# Patient Record
Sex: FEMALE | Race: WHITE | NOT HISPANIC OR LATINO | Employment: OTHER | URBAN - METROPOLITAN AREA
[De-identification: names, ages, dates, MRNs, and addresses within clinical notes are randomized per-mention and may not be internally consistent; named-entity substitution may affect disease eponyms.]

---

## 2017-02-06 DIAGNOSIS — I10 ESSENTIAL HYPERTENSION: Primary | ICD-10-CM

## 2017-02-06 RX ORDER — LISINOPRIL 5 MG/1
5 TABLET ORAL DAILY
Qty: 30 TABLET | Refills: 0 | Status: SHIPPED | OUTPATIENT
Start: 2017-02-06 | End: 2017-02-28

## 2017-02-06 NOTE — TELEPHONE ENCOUNTER
Lisinopril     Last Written Prescription Date: 11/19/15  Last Fill Quantity: 90, # refills: 3  Last Office Visit with G, P or [facility] prescribing provider: 11/19/15       POTASSIUM   Date Value Ref Range Status   11/19/2015 4.2 3.4 - 5.3 mmol/L Final     CREATININE   Date Value Ref Range Status   11/19/2015 0.65 0.52 - 1.04 mg/dL Final     BP Readings from Last 3 Encounters:   10/10/16 141/86   06/13/16 122/74   11/19/15 136/82

## 2017-02-16 DIAGNOSIS — F43.23 ADJUSTMENT DISORDER WITH MIXED ANXIETY AND DEPRESSED MOOD: ICD-10-CM

## 2017-02-16 NOTE — TELEPHONE ENCOUNTER
Citalopram     Last Written Prescription Date: 11/19/15  Last Fill Quantity: 90, # refills: 3  Last Office Visit with G primary care provider:  11/19/15        Last PHQ-9 score on record=   PHQ-9 SCORE 11/19/2015   Total Score -   Total Score 6

## 2017-02-22 NOTE — TELEPHONE ENCOUNTER
Routing refill request to provider for review/approval because:  Drug interaction warning- is due for visit, last seen in November 2015. I was going to give 30 per protocol with note to be seen and there is a warning.     Destinee Weiner RNC

## 2017-02-23 RX ORDER — CITALOPRAM HYDROBROMIDE 10 MG/1
10 TABLET ORAL DAILY
Qty: 30 TABLET | Refills: 0 | OUTPATIENT
Start: 2017-02-23

## 2017-02-28 ENCOUNTER — OFFICE VISIT (OUTPATIENT)
Dept: FAMILY MEDICINE | Facility: CLINIC | Age: 77
End: 2017-02-28
Payer: COMMERCIAL

## 2017-02-28 VITALS
DIASTOLIC BLOOD PRESSURE: 71 MMHG | BODY MASS INDEX: 20.77 KG/M2 | WEIGHT: 117.2 LBS | TEMPERATURE: 96.4 F | HEIGHT: 63 IN | SYSTOLIC BLOOD PRESSURE: 124 MMHG | HEART RATE: 61 BPM

## 2017-02-28 DIAGNOSIS — F33.40 RECURRENT MAJOR DEPRESSIVE DISORDER, IN REMISSION (H): ICD-10-CM

## 2017-02-28 DIAGNOSIS — F43.23 ADJUSTMENT DISORDER WITH MIXED ANXIETY AND DEPRESSED MOOD: ICD-10-CM

## 2017-02-28 DIAGNOSIS — I10 ESSENTIAL HYPERTENSION: Primary | ICD-10-CM

## 2017-02-28 LAB
ANION GAP SERPL CALCULATED.3IONS-SCNC: 6 MMOL/L (ref 3–14)
BUN SERPL-MCNC: 17 MG/DL (ref 7–30)
CALCIUM SERPL-MCNC: 9 MG/DL (ref 8.5–10.1)
CHLORIDE SERPL-SCNC: 105 MMOL/L (ref 94–109)
CO2 SERPL-SCNC: 30 MMOL/L (ref 20–32)
CREAT SERPL-MCNC: 0.65 MG/DL (ref 0.52–1.04)
GFR SERPL CREATININE-BSD FRML MDRD: 88 ML/MIN/1.7M2
GLUCOSE SERPL-MCNC: 88 MG/DL (ref 70–99)
POTASSIUM SERPL-SCNC: 4.2 MMOL/L (ref 3.4–5.3)
SODIUM SERPL-SCNC: 141 MMOL/L (ref 133–144)

## 2017-02-28 PROCEDURE — 80048 BASIC METABOLIC PNL TOTAL CA: CPT | Performed by: NURSE PRACTITIONER

## 2017-02-28 PROCEDURE — 36415 COLL VENOUS BLD VENIPUNCTURE: CPT | Performed by: NURSE PRACTITIONER

## 2017-02-28 PROCEDURE — 99214 OFFICE O/P EST MOD 30 MIN: CPT | Performed by: NURSE PRACTITIONER

## 2017-02-28 RX ORDER — CITALOPRAM HYDROBROMIDE 10 MG/1
20 TABLET ORAL DAILY
Qty: 60 TABLET | Refills: 2 | Status: SHIPPED | OUTPATIENT
Start: 2017-02-28 | End: 2018-08-31

## 2017-02-28 RX ORDER — LISINOPRIL 5 MG/1
5 TABLET ORAL DAILY
Qty: 30 TABLET | Refills: 11 | Status: SHIPPED | OUTPATIENT
Start: 2017-02-28 | End: 2017-06-12

## 2017-02-28 ASSESSMENT — ANXIETY QUESTIONNAIRES
6. BECOMING EASILY ANNOYED OR IRRITABLE: SEVERAL DAYS
1. FEELING NERVOUS, ANXIOUS, OR ON EDGE: SEVERAL DAYS
3. WORRYING TOO MUCH ABOUT DIFFERENT THINGS: SEVERAL DAYS
5. BEING SO RESTLESS THAT IT IS HARD TO SIT STILL: NOT AT ALL
GAD7 TOTAL SCORE: 4
7. FEELING AFRAID AS IF SOMETHING AWFUL MIGHT HAPPEN: NOT AT ALL
2. NOT BEING ABLE TO STOP OR CONTROL WORRYING: SEVERAL DAYS
IF YOU CHECKED OFF ANY PROBLEMS ON THIS QUESTIONNAIRE, HOW DIFFICULT HAVE THESE PROBLEMS MADE IT FOR YOU TO DO YOUR WORK, TAKE CARE OF THINGS AT HOME, OR GET ALONG WITH OTHER PEOPLE: SOMEWHAT DIFFICULT

## 2017-02-28 ASSESSMENT — PATIENT HEALTH QUESTIONNAIRE - PHQ9: 5. POOR APPETITE OR OVEREATING: NOT AT ALL

## 2017-02-28 NOTE — LETTER
[facility]  [address] [place] [address]  Phone: [phone]    February 28, 2017    Gail Cuba  [address]  [place] [address]          Dear MsMarya Rosa Elena,    The results of your recent lab tests were within normal limits.  Component      Latest Ref Rng & Units 2/28/2017   Sodium      133 - 144 mmol/L 141   Potassium      3.4 - 5.3 mmol/L 4.2   Chloride      94 - 109 mmol/L 105   Carbon Dioxide      20 - 32 mmol/L 30   Anion Gap      3 - 14 mmol/L 6   Glucose      70 - 99 mg/dL 88   Urea Nitrogen      7 - 30 mg/dL 17   Creatinine      0.52 - 1.04 mg/dL 0.65   GFR Estimate      >60 mL/min/1.7m2 88   GFR Estimate If Black      >60 mL/min/1.7m2 >90 . . .   Calcium      8.5 - 10.1 mg/dL 9.0     If you have any further questions or problems, please contact our office.    Sincerely,      Maryellen Barillas NP / cb

## 2017-02-28 NOTE — PATIENT INSTRUCTIONS
1. Labs today  2. Take 2 tablet of Citalopram  3. Follow up in 2 months (8 weeks)          Thank you for choosing [facility].  You may be receiving a survey in the mail from [employer] regarding your visit today.  Please take a few minutes to complete and return the survey to let us know how we are doing.      If you have questions or concerns, please contact us via Augment or you can contact your care team at [phone].    Our Clinic hours are:  Monday 6:40 am  to 7:00 pm  Tuesday -Friday 6:40 am to 5:00 pm    The [place] outpatient lab hours are:  Monday - Friday 6:10 am to 4:45 pm  Saturdays 7:00 am to 11:00 am  Appointments are required, call [phone]    If you have clinical questions after hours or would like to schedule an appointment,  call the clinic at [phone].

## 2017-02-28 NOTE — MR AVS SNAPSHOT
After Visit Summary   2/28/2017    Gail Cuba    MRN: [id number]           Patient Information     Date Of Birth          1940        Visit Information        Provider Department      2/28/2017 10:20 AM Maryellen Barillas APRN CNP [facility]        Today's Diagnoses     Mild major depression (H)    -  1    Essential hypertension        Adjustment disorder with mixed anxiety and depressed mood          Care Instructions    1. Labs today  2. Take 2 tablet of Citalopram  3. Follow up in 2 months (8 weeks)          Thank you for choosing [facility].  You may be receiving a survey in the mail from [employer] regarding your visit today.  Please take a few minutes to complete and return the survey to let us know how we are doing.      If you have questions or concerns, please contact us via ZummZumm or you can contact your care team at [phone].    Our Clinic hours are:  Monday 6:40 am  to 7:00 pm  Tuesday -Friday 6:40 am to 5:00 pm    The [place] outpatient lab hours are:  Monday - Friday 6:10 am to 4:45 pm  Saturdays 7:00 am to 11:00 am  Appointments are required, call [phone]    If you have clinical questions after hours or would like to schedule an appointment,  call the clinic at [phone].        Follow-ups after your visit        Your next 10 appointments already scheduled     Jun 05, 2017 10:10 AM CDT   LAB with [facility] ([facility])    [address] [address]   [phone]           Patient must bring picture ID.  Patient should be prepared to give a urine specimen  Please do not eat 10-12 hours before your appointment if you are coming in fasting for labs on lipids, cholesterol, or glucose (sugar).  Pregnant women should follow their Care Team instructions. Water with medications is okay. Do not drink coffee or other fluids.   If you have concerns about taking  your medications, please ask at  "office or if scheduling via Enlighted, send a message by clicking on Secure Messaging, Message Your Care Team.            2017 10:30 AM CDT   Return Visit with Marilee Medeiros MD   [facility] ([facility])    [facility]  [address]  [place] [address] [phone]              Who to contact     If you have questions or need follow up information about today's clinic visit or your schedule please contact [facility] directly at [phone].  Normal or non-critical lab and imaging results will be communicated to you by [email or web address], letter or phone within 4 business days after the clinic has received the results. If you do not hear from us within 7 days, please contact the clinic through [employer] or phone. If you have a critical or abnormal lab result, we will notify you by phone as soon as possible.  Submit refill requests through Enlighted or call your pharmacy and they will forward the refill request to us. Please allow 3 business days for your refill to be completed.          Additional Information About Your Visit        Enlighted Information     Enlighted lets you send messages to your doctor, view your test results, renew your prescriptions, schedule appointments and more. To sign up, go to [email or web address] . Click on \"Log in\" on the left side of the screen, which will take you to the Welcome page. Then click on \"Sign up Now\" on the right side of the page.     You will be asked to enter the access code listed below, as well as some personal information. Please follow the directions to create your username and password.     Your access code is: [email or web address]  Expires: 2017 10:43 AM     Your access code will  in 90 days. If you need help or a new code, please call your [facility] or [phone].        Care EveryWhere ID     This is your Care EveryWhere ID. This could be used by other organizations to access your [place] " "medical records  [id number]        Your Vitals Were     Pulse Temperature Height BMI (Body Mass Index)          61 96.4  F (35.8  C) (Tympanic) 5' 3\" (1.6 m) 20.76 kg/m2         Blood Pressure from Last 3 Encounters:   02/28/17 124/71   10/10/16 141/86   06/13/16 122/74    Weight from Last 3 Encounters:   02/28/17 117 lb 3.2 oz (53.2 kg)   10/10/16 113 lb (51.3 kg)   06/13/16 117 lb (53.1 kg)              We Performed the Following     Basic metabolic panel     DEPRESSION ACTION PLAN (DAP)          Today's Medication Changes          These changes are accurate as of: 2/28/17 10:43 AM.  If you have any questions, ask your nurse or doctor.               These medicines have changed or have updated prescriptions.        Dose/Directions    citalopram 10 MG tablet   Commonly known as:  celeXA   This may have changed:  how much to take   Used for:  Adjustment disorder with mixed anxiety and depressed mood   Changed by:  Maryellen Barillas APRN CNP        Dose:  20 mg   Take 2 tablets (20 mg) by mouth daily   Quantity:  60 tablet   Refills:  2            Where to get your medicines      These medications were sent to [facility] [address] [place] [address] [address]   [address] [facility] [address]     Phone:  [phone]     citalopram 10 MG tablet    lisinopril 5 MG tablet                Primary Care Provider Office Phone # Fax #    NILS Ceballos [phone]       [facility] [address] [address]        Thank you!     Thank you for choosing [facility]  for your care. Our goal is always to provide you with excellent care. Hearing back from our patients is one way we can continue to improve our services. Please take a few minutes to complete the written survey that you may receive in the mail after your visit with us. Thank you!             Your Updated Medication List - Protect others around you: Learn how to safely use, store and throw " away your medicines at www.disposemymeds.org.          This list is accurate as of: 2/28/17 10:43 AM.  Always use your most recent med list.                   Brand Name Dispense Instructions for use    ALEVE PO      Take 220 mg by mouth nightly as needed for moderate pain       aspirin 81 MG tablet      1 TABLET DAILY       CALCIUM + D PO      1 tab daily       citalopram 10 MG tablet    celeXA    60 tablet    Take 2 tablets (20 mg) by mouth daily       lisinopril 5 MG tablet    PRINIVIL/ZESTRIL    30 tablet    Take 1 tablet (5 mg) by mouth daily (Needs follow-up appointment for this medication)       VITAMIN B-12 PO      1 tab daily

## 2017-02-28 NOTE — NURSING NOTE
"Chief Complaint   Patient presents with     Hypertension     Pt here for f/u on b/p meds.     Anxiety     Also f/u on anxiety, out of meds.       Initial /71  Pulse 61  Temp 96.4  F (35.8  C) (Tympanic)  Ht 5' 3\" (1.6 m)  Wt 117 lb 3.2 oz (53.2 kg)  BMI 20.76 kg/m2 Estimated body mass index is 20.76 kg/(m^2) as calculated from the following:    Height as of this encounter: 5' 3\" (1.6 m).    Weight as of this encounter: 117 lb 3.2 oz (53.2 kg).  Medication Reconciliation: complete  Carolyne Romero CMA    "

## 2017-02-28 NOTE — PROGRESS NOTES
SUBJECTIVE:                                                    Gail Cuba is a 77 year old female who presents to clinic today for the following health issues:  Chief Complaint   Patient presents with     Hypertension     Pt here for f/u on b/p meds.     Anxiety     Also f/u on anxiety, out of meds.       Hypertension Follow-up      Outpatient blood pressures are not being checked.    Low Salt Diet: adds a lot of salt to food  BP Readings from Last 3 Encounters:   02/28/17 124/71   10/10/16 141/86   06/13/16 122/74          Anxiety Follow-Up    Status since last visit: stable- having increase in anxiety.     Other associated symptoms:has had trouble sleeping forever    Complicating factors:   Significant life event: Yes-  Family, is now watching great grandchild 1 1/2 days per wk   Current substance abuse: None during the winter  Depression symptoms: No  MARTIN-7 SCORE 7/3/2013 8/4/2014 11/19/2015   Total Score 3 6 -   Total Score - - 3        GAD7       Amount of exercise or physical activity: None    Problems taking medications regularly: currently out    Medication side effects: none  Diet: regular (no restrictions)    -------------------------------------    Problem list and histories reviewed & adjusted, as indicated.  Additional history: as documented    Patient Active Problem List   Diagnosis     Osteoporosis     Breast cancer (H)     THICKENING ENDOMETRIUM     Back pain     Hyperlipidemia LDL goal <130     Urge incontinence of urine     Mild major depression (H)     UTI (urinary tract infection)     Health Care Home     Anxiety     Loose stools     Dysuria     Endometrial mass     Osteoporosis     Past Surgical History   Procedure Laterality Date     Surgical history of -        Neck surgery.     Hc excision breast lesion, open >=1  6-13-08     with sentinal node biopsy     Breast lumpectomy, rt/lt  1-26-09     Breast Lumpectomy RT     Surgical history of -   11/17/09     L4-5 back surgery     D & c   "5/24/10     for EMB      Colonoscopy  7/25/2013     Procedure: COLONOSCOPY;  Colonoscopy  ;  Surgeon: Deep Purcell MD;  Location: [facility]     Biopsy breast       Lumpectomy breast  2009     right breast     Dilation and curettage, operative hysteroscopy, combined  1/14/2014     Procedure: COMBINED DILATION AND CURETTAGE, OPERATIVE HYSTEROSCOPY;  Hysteroscopy with dilation and curettage, possible polypectomy- choice anes  ;  Surgeon: Lauryn Hoffman MD;  Location: [facility]       Social History   Substance Use Topics     Smoking status: Never Smoker     Smokeless tobacco: Never Used     Alcohol use Yes      Comment: occasional     Family History   Problem Relation Age of Onset     Alcohol/Drug Mother      alcohalic     Hypertension Mother      CEREBROVASCULAR DISEASE Mother      Thyroid Disease Sister      Musculoskeletal Disorder Son      DIABETES Father      Prostate Cancer Father      Cancer - colorectal Maternal Grandfather      Asthma No family hx of      C.A.D. No family hx of      Breast Cancer No family hx of      Anesthesia Reaction No family hx of      Blood Disease No family hx of      Ovarian Cancer No family hx of            Reviewed and updated as needed this visit by clinical staff  Tobacco  Allergies  Med Hx  Surg Hx  Fam Hx  Soc Hx      Reviewed and updated as needed this visit by Provider         ROS:  Constitutional, HEENT, cardiovascular, pulmonary, GI, , musculoskeletal, neuro, skin, endocrine and psych systems are negative, except as otherwise noted.    OBJECTIVE:                                                    /71  Pulse 61  Temp 96.4  F (35.8  C) (Tympanic)  Ht 5' 3\" (1.6 m)  Wt 117 lb 3.2 oz (53.2 kg)  BMI 20.76 kg/m2  Body mass index is 20.76 kg/(m^2).  GENERAL: healthy, alert and no distress  NECK: no adenopathy, no asymmetry, masses, or scars and thyroid normal to palpation  RESP: lungs clear to auscultation - no rales, rhonchi or wheezes  CV: regular rate and " rhythm, normal S1 S2, no S3 or S4, no murmur, click or rub, no peripheral edema and peripheral pulses strong  MS: no gross musculoskeletal defects noted, no edema  PSYCH: mentation appears normal, affect normal/bright    Diagnostic Test Results:  none      ASSESSMENT/PLAN:                                                        1. Essential hypertension  controlled  - lisinopril (PRINIVIL/ZESTRIL) 5 MG tablet; Take 1 tablet (5 mg) by mouth daily (Needs follow-up appointment for this medication)  Dispense: 30 tablet; Refill: 11  - Basic metabolic panel    2. Adjustment disorder with mixed anxiety and depressed mood  Having increase in anxiety with family situation- situational anxiety  - citalopram (CELEXA) 10 MG tablet; Take 2 tablets (20 mg) by mouth daily  Dispense: 60 tablet; Refill: 2 (will increase from 1 table to 2 tablets daily)  - follow up in 2 months for reassessment of symptoms.  - encouraged patient to speak with family members to address conflict which is increasing her anxiety    3. Mild major depression (H)  citalopram (CELEXA) 10 MG tablet; Take 2 tablets (20 mg) by mouth daily  Dispense: 60 tablet; Refill: 2 (will increase from 1 table to 2 tablets daily)      Follow up in 2 months    NILS Ceballos [facility]

## 2017-03-01 ASSESSMENT — ANXIETY QUESTIONNAIRES: GAD7 TOTAL SCORE: 4

## 2017-03-01 ASSESSMENT — PATIENT HEALTH QUESTIONNAIRE - PHQ9: SUM OF ALL RESPONSES TO PHQ QUESTIONS 1-9: 3

## 2017-03-17 ENCOUNTER — TRANSFERRED RECORDS (OUTPATIENT)
Dept: HEALTH INFORMATION MANAGEMENT | Facility: CLINIC | Age: 77
End: 2017-03-17

## 2017-06-05 ENCOUNTER — HOSPITAL ENCOUNTER (OUTPATIENT)
Dept: LAB | Facility: CLINIC | Age: 77
Discharge: HOME | End: 2017-06-05
Attending: INTERNAL MEDICINE | Admitting: INTERNAL MEDICINE
Payer: COMMERCIAL

## 2017-06-05 DIAGNOSIS — Z85.3 PERSONAL HISTORY OF MALIGNANT NEOPLASM OF BREAST: ICD-10-CM

## 2017-06-05 LAB
ALBUMIN SERPL-MCNC: 3.4 G/DL (ref 3.4–5)
ALP SERPL-CCNC: 71 U/L (ref 40–150)
ALT SERPL W P-5'-P-CCNC: 17 U/L (ref 0–50)
AST SERPL W P-5'-P-CCNC: 18 U/L (ref 0–45)
BILIRUB DIRECT SERPL-MCNC: 0.1 MG/DL (ref 0–0.2)
BILIRUB SERPL-MCNC: 0.5 MG/DL (ref 0.2–1.3)
CANCER AG27-29 SERPL-ACNC: 12 U/ML (ref 0–39)
PROT SERPL-MCNC: 6.6 G/DL (ref 6.8–8.8)

## 2017-06-05 PROCEDURE — 86300 IMMUNOASSAY TUMOR CA 15-3: CPT | Performed by: INTERNAL MEDICINE

## 2017-06-05 PROCEDURE — 36415 COLL VENOUS BLD VENIPUNCTURE: CPT | Performed by: INTERNAL MEDICINE

## 2017-06-05 PROCEDURE — 80076 HEPATIC FUNCTION PANEL: CPT | Performed by: INTERNAL MEDICINE

## 2017-06-12 ENCOUNTER — ONCOLOGY VISIT (OUTPATIENT)
Dept: ONCOLOGY | Facility: CLINIC | Age: 77
End: 2017-06-12
Attending: INTERNAL MEDICINE
Payer: COMMERCIAL

## 2017-06-12 VITALS
HEART RATE: 58 BPM | TEMPERATURE: 97.7 F | OXYGEN SATURATION: 97 % | DIASTOLIC BLOOD PRESSURE: 76 MMHG | SYSTOLIC BLOOD PRESSURE: 124 MMHG | HEIGHT: 64 IN | WEIGHT: 117.9 LBS | BODY MASS INDEX: 20.13 KG/M2 | RESPIRATION RATE: 16 BRPM

## 2017-06-12 DIAGNOSIS — M81.0 OSTEOPOROSIS: ICD-10-CM

## 2017-06-12 DIAGNOSIS — Z85.3 PERSONAL HISTORY OF MALIGNANT NEOPLASM OF BREAST: Primary | ICD-10-CM

## 2017-06-12 PROCEDURE — 99213 OFFICE O/P EST LOW 20 MIN: CPT | Performed by: INTERNAL MEDICINE

## 2017-06-12 PROCEDURE — 99211 OFF/OP EST MAY X REQ PHY/QHP: CPT

## 2017-06-12 RX ORDER — OMEGA-3-ACID ETHYL ESTERS 1 G/1
2 CAPSULE, LIQUID FILLED ORAL DAILY
COMMUNITY
End: 2019-10-10 | Stop reason: ALTCHOICE

## 2017-06-12 ASSESSMENT — PAIN SCALES - GENERAL: PAINLEVEL: NO PAIN (0)

## 2017-06-12 NOTE — PATIENT INSTRUCTIONS
Dr. Medeiros would like you to have a Mammogram in October and would like to see you back for a follow up appointment in 1 year with labs prior. When you are in need of a refill, please call your pharmacy and they will send us a request.  Copy of appointments, and after visit summary (AVS) given to patient.  If you have any questions please call April Cottrell RN, BSN Oncology Hematology  [facility] [phone]. For questions after business hours, or on holidays/weekends, please call our after hours Nurse Triage line [phone]. Thank you.

## 2017-06-12 NOTE — MR AVS SNAPSHOT
After Visit Summary   6/12/2017    Gail Cuba    MRN: [id number]           Patient Information     Date Of Birth          1940        Visit Information        Provider Department      6/12/2017 10:30 AM Marilee Medeiros MD [facility]        Today's Diagnoses     Personal history of malignant neoplasm of breast    -  1    Osteoporosis          Care Instructions    Dr. Medeiros would like you to have a Mammogram in October and would like to see you back for a follow up appointment in 1 year with labs prior. When you are in need of a refill, please call your pharmacy and they will send us a request.  Copy of appointments, and after visit summary (AVS) given to patient.  If you have any questions please call April Cottrell RN, BSN Oncology Hematology  [facility] [phone]. For questions after business hours, or on holidays/weekends, please call our after hours Nurse Triage line [phone]. Thank you.             Follow-ups after your visit        Who to contact     If you have questions or need follow up information about today's clinic visit or your schedule please contact [facility] directly at [phone].  Normal or non-critical lab and imaging results will be communicated to you by MyChart, letter or phone within 4 business days after the clinic has received the results. If you do not hear from us within 7 days, please contact the clinic through MyChart or phone. If you have a critical or abnormal lab result, we will notify you by phone as soon as possible.  Submit refill requests through [facility] or call your pharmacy and they will forward the refill request to us. Please allow 3 business days for your refill to be completed.          Additional Information About Your Visit        MyChart Information     [facility] lets you send messages to your doctor, view your test results, renew your prescriptions, schedule appointments and more. To sign  "up, go to [email or web address] . Click on \"Log in\" on the left side of the screen, which will take you to the Welcome page. Then click on \"Sign up Now\" on the right side of the page.     You will be asked to enter the access code listed below, as well as some personal information. Please follow the directions to create your username and password.     Your access code is: [email or web address]  Expires: 2017  2:29 PM     Your access code will  in 90 days. If you need help or a new code, please call your [place] clinic or [phone].        Care EveryWhere ID     This is your Care EveryWhere ID. This could be used by other organizations to access your [place] medical records  [id number]        Your Vitals Were     Pulse Temperature Respirations Height Pulse Oximetry Breastfeeding?    58 97.7  F (36.5  C) (Tympanic) 16 1.613 m (5' 3.5\") 97% No    BMI (Body Mass Index)                   20.56 kg/m2            Blood Pressure from Last 3 Encounters:   17 124/76   17 124/71   10/10/16 141/86    Weight from Last 3 Encounters:   17 53.5 kg (117 lb 14.4 oz)   17 53.2 kg (117 lb 3.2 oz)   10/10/16 51.3 kg (113 lb)               Primary Care Provider Office Phone # Fax #    Maryellen NILS [facility] [phone]       [address] [address]        Thank you!     Thank you for choosing [facility]  for your care. Our goal is always to provide you with excellent care. Hearing back from our patients is one way we can continue to improve our services. Please take a few minutes to complete the written survey that you may receive in the mail after your visit with us. Thank you!             Your Updated Medication List - Protect others around you: Learn how to safely use, store and throw away your medicines at www.disposemymeds.org.          This list is accurate as of: 17 11:59 PM.  Always use your most recent med list.                   " Brand Name Dispense Instructions for use    ALEVE PO      Take 220 mg by mouth nightly as needed for moderate pain       aspirin 81 MG tablet      1 TABLET DAILY       CALCIUM + D PO      1 tab daily       citalopram 10 MG tablet    celeXA    60 tablet    Take 2 tablets (20 mg) by mouth daily       omega-3 acid ethyl esters 1 G capsule    Lovaza     Take 2 g by mouth daily       VITAMIN B-12 PO      1 tab daily

## 2017-06-12 NOTE — NURSING NOTE
"Oncology Rooming Note    June 12, 2017 10:27 AM   Gail Cuba is a 77 year old female who presents for:    Chief Complaint   Patient presents with     Oncology Clinic Visit     1 year recheck Breast CA, review labs     Initial Vitals: /76 (BP Location: Right arm, Patient Position: Chair, Cuff Size: Adult Regular)  Pulse 58  Temp 97.7  F (36.5  C) (Tympanic)  Resp 16  Ht 1.613 m (5' 3.5\")  Wt 53.5 kg (117 lb 14.4 oz)  SpO2 97%  Breastfeeding? No  BMI 20.56 kg/m2 Estimated body mass index is 20.56 kg/(m^2) as calculated from the following:    Height as of this encounter: 1.613 m (5' 3.5\").    Weight as of this encounter: 53.5 kg (117 lb 14.4 oz). Body surface area is 1.55 meters squared.  No Pain (0) Comment: Data Unavailable   No LMP recorded. Patient is postmenopausal.  Allergies reviewed: Yes  Medications reviewed: Yes    Medications: Medication refills not needed today.  Pharmacy [facility] entered into [employer]: [facility] - [place], [place] - [address]    Clinical concerns: 1 year recheck Breast CA, review labs. No concerns today.     7 minutes for nursing intake (face to face time)     Veronica Marshall CMA              "

## 2017-06-12 NOTE — NURSING NOTE
"Oncology Rooming Note    June 12, 2017 10:25 AM   Gail Cuba is a 77 year old female who presents for:    Chief Complaint   Patient presents with     Oncology Clinic Visit     1 year recheck Breast CA, review labs     Initial Vitals: /73 (BP Location: Right arm, Patient Position: Chair, Cuff Size: Adult Regular)  Pulse 60  Temp 97.7  F (36.5  C) (Tympanic)  Resp 16  Ht 1.613 m (5' 3.5\")  Wt 53.5 kg (117 lb 14.4 oz)  SpO2 97%  Breastfeeding? No  BMI 20.56 kg/m2 Estimated body mass index is 20.56 kg/(m^2) as calculated from the following:    Height as of this encounter: 1.613 m (5' 3.5\").    Weight as of this encounter: 53.5 kg (117 lb 14.4 oz). Body surface area is 1.55 meters squared.  No Pain (0) Comment: Data Unavailable   No LMP recorded. Patient is postmenopausal.  Allergies reviewed: Yes  Medications reviewed: Yes    Medications: Medication refills not needed today.  Pharmacy name entered into [employer]: [facility] - [place], [place] - [address]    Clinical concerns:      7  minutes for nursing intake (face to face time)     Veronica Marshall CMA              "

## 2017-06-13 NOTE — PROGRESS NOTES
"DATE OF SERVICE:  06/12/2017       CHIEF COMPLAINT AND REASON FOR VISIT:  Breast cancer, status post tamoxifen.      HPI: She was diagnosed in 06/2008 through screening mammogram, had a lumpectomy, initially locally in July. Apparently, that one missed the tumor and had a re-excision 01/2009 with Dr. Marsh at the , indicating it is a grade 1 lesion, ER/HI positive, HER-2 negative. She had documented as Stage I disease. She initially declined post-lumpectomy radiation and eventually changed her mind, finished the radiation in 04/2009. She was on tamoxifen from summer 2008 to summer 2013.   PAST MEDICAL HISTORY: Osteoporosis, hyperlipidemia. Recurrent UTI.  MEDICATIONS: Reviewed in the Epic system. She could not tolerate Fosamax.   FAMILY HISTORY: Denied any family history of malignancy.   SOCIAL HISTORY: She is retired. Denies smoking and drinking.       REVIEW OF SYSTEMS:  Gail uCba is in her usual state of health.  She has no breast tissue.  She is active.  She has no hospitalizations or major surgery in the past 1 year.  She still has chronic constipation.      PHYSICAL EXAMINATION:    VITAL SIGNS:  Blood pressure 124/76, pulse 58, temperature 97.7  F (36.5  C), temperature source Tympanic, resp. rate 16, height 1.613 m (5' 3.5\"), weight 53.5 kg (117 lb 14.4 oz), SpO2 97 %, not currently breastfeeding.    HEENT: The patient is normocephalic, atraumatic. Pupils are equally reactive to light.  Sclerae are anicteric.  Moist oral mucosa.  Negative pharynx.  No oral thrush.   NECK:  Supple.  No jugular venous distention.  Thyroid is not palpable.   LYMPH NODES:  Superficial lymphadenopathy is not appreciable in the bilateral cervical, supraclavicular, axillary or inguinal areas.   CARDIOVASCULAR:  S1, S2 regular with no murmurs or gallops.  No carotid or abdominal bruits.   PULMONARY:  Lungs are clear to auscultation and percussion bilaterally.  There is no wheezing or rhonchi.   GASTROINTESTINAL:  Abdomen is " soft, nontender.  No hepatosplenomegaly.  No signs of ascites.  No mass appreciable.   MUSCULOSKELETAL/EXTREMITIES:  No edema.  No cyanotic changes.  No signs of joint deformity.  No lymphedema.   NEUROLOGIC:  Cranial nerves II-XII are grossly intact.  Sensation intact.  Muscle strength and muscle tone symmetrical, 5/5 throughout.   BACK:  No spinal or paraspinal tenderness.  No CVA tenderness.   SKIN:  No petechiae.  No rash.  No signs of cellulitis.   BREASTS:  Bilateral breast exam revealed well-healed lumpectomy scar on the right side.  No palpable lesions, no skin changes.      CURRENT LABORATORY DATAReviwed  2017 liver function tests, tumor marker are satisfactory.      Current Image Reviewed  Last mammogram 10/2016, bilateral screening mammogram that was negative.      OLD DATA REVIEW IN SUMMARY:   OLD DATA REVIEW IN SUMMARY:   Pelvic US 2013- uterine fibroid. Heterogeneous endometrial complex soft tissue, cannot distinguish between a mass versus abnormally thickened endometrial lining. Recommend clinical correlation.  Mammogram from 10/2011 that was negative.   Chest x-ray, bone scan 2011 was negative.   DEXA scan 2010, osteoporosis.      ASSESSMENT AND PLAN:   1.  Stage I breast cancer in , status post lumpectomy, radiation and tamoxifen for 5 years until .    We talked about ER positive breast cancer recurrence pattern.  She still feels comfortable coming in to see us on a yearly basis.    She is due 12-month follow up with labs.  Her mammogram is due in October each year.     2.  Osteoporosis.  She is a 77-year-old female.  We emphasized the importance of vitamin D and calcium intake.  She had poor tolerance to oral bisphosphonates.  She is not interested in repeating DEXA scan.         PIERO SALVADOR MD             D: 2017 13:47   T: 2017 22:28   MT: JESUS      Name:     LILLI CASH   MRN:      [id number]        Account:      [id number]   :      1940            Visit Date:   06/12/2017      Document: [id number]

## 2018-02-09 ENCOUNTER — TRANSFERRED RECORDS (OUTPATIENT)
Dept: HEALTH INFORMATION MANAGEMENT | Facility: CLINIC | Age: 78
End: 2018-02-09

## 2018-08-31 ENCOUNTER — OFFICE VISIT (OUTPATIENT)
Dept: FAMILY MEDICINE | Facility: CLINIC | Age: 78
End: 2018-08-31
Payer: COMMERCIAL

## 2018-08-31 VITALS
HEART RATE: 64 BPM | HEIGHT: 63 IN | WEIGHT: 119 LBS | OXYGEN SATURATION: 98 % | TEMPERATURE: 97.5 F | DIASTOLIC BLOOD PRESSURE: 84 MMHG | BODY MASS INDEX: 21.09 KG/M2 | SYSTOLIC BLOOD PRESSURE: 142 MMHG

## 2018-08-31 DIAGNOSIS — F43.23 ADJUSTMENT DISORDER WITH MIXED ANXIETY AND DEPRESSED MOOD: ICD-10-CM

## 2018-08-31 DIAGNOSIS — I10 ESSENTIAL HYPERTENSION, BENIGN: Primary | ICD-10-CM

## 2018-08-31 PROCEDURE — 99214 OFFICE O/P EST MOD 30 MIN: CPT | Performed by: NURSE PRACTITIONER

## 2018-08-31 RX ORDER — CITALOPRAM HYDROBROMIDE 20 MG/1
20 TABLET ORAL DAILY
Qty: 30 TABLET | Refills: 5 | Status: SHIPPED | OUTPATIENT
Start: 2018-08-31 | End: 2019-07-11

## 2018-08-31 RX ORDER — LISINOPRIL 5 MG/1
5 TABLET ORAL DAILY
Qty: 30 TABLET | Refills: 5 | Status: SHIPPED | OUTPATIENT
Start: 2018-08-31 | End: 2019-04-13

## 2018-08-31 ASSESSMENT — ANXIETY QUESTIONNAIRES
3. WORRYING TOO MUCH ABOUT DIFFERENT THINGS: SEVERAL DAYS
1. FEELING NERVOUS, ANXIOUS, OR ON EDGE: NEARLY EVERY DAY
2. NOT BEING ABLE TO STOP OR CONTROL WORRYING: MORE THAN HALF THE DAYS
6. BECOMING EASILY ANNOYED OR IRRITABLE: NEARLY EVERY DAY
GAD7 TOTAL SCORE: 9
7. FEELING AFRAID AS IF SOMETHING AWFUL MIGHT HAPPEN: NOT AT ALL
IF YOU CHECKED OFF ANY PROBLEMS ON THIS QUESTIONNAIRE, HOW DIFFICULT HAVE THESE PROBLEMS MADE IT FOR YOU TO DO YOUR WORK, TAKE CARE OF THINGS AT HOME, OR GET ALONG WITH OTHER PEOPLE: SOMEWHAT DIFFICULT
5. BEING SO RESTLESS THAT IT IS HARD TO SIT STILL: NOT AT ALL

## 2018-08-31 ASSESSMENT — PATIENT HEALTH QUESTIONNAIRE - PHQ9: 5. POOR APPETITE OR OVEREATING: NOT AT ALL

## 2018-08-31 NOTE — PATIENT INSTRUCTIONS
Start Lisinopril 5 mg daily    Blood sugars normal:    Results for LILLI CASH (MRN [id number]) as of 8/31/2018 14:38   Ref. Range 8/30/2012 08:09 2/28/2013 11:32 8/4/2014 13:22 11/19/2015 10:19 2/28/2017 10:45   Glucose Latest Ref Range: 70 - 99 mg/dL 87 81 98 80 88     Start Celexa 20 mg daily     Okay for dental procedure

## 2018-08-31 NOTE — MR AVS SNAPSHOT
"              After Visit Summary   8/31/2018    Gail Cash    MRN: [id number]           Patient Information     Date Of Birth          1940        Visit Information        Provider Department      8/31/2018 2:20 PM Deja Lyons APRN CNP [facility]        Today's Diagnoses     Essential hypertension, benign    -  1    Adjustment disorder with mixed anxiety and depressed mood          Care Instructions    Start Lisinopril 5 mg daily    Blood sugars normal:    Results for GAIL CASH (MRN [id number]) as of 8/31/2018 14:38   Ref. Range 8/30/2012 08:09 2/28/2013 11:32 8/4/2014 13:22 11/19/2015 10:19 2/28/2017 10:45   Glucose Latest Ref Range: 70 - 99 mg/dL 87 81 98 80 88     Start Celexa 20 mg daily     Okay for dental procedure           Follow-ups after your visit        Who to contact     If you have questions or need follow up information about today's clinic visit or your schedule please contact [facility] directly at [phone].  Normal or non-critical lab and imaging results will be communicated to you by MyChart, letter or phone within 4 business days after the clinic has received the results. If you do not hear from us within 7 days, please contact the clinic through MyChart or phone. If you have a critical or abnormal lab result, we will notify you by phone as soon as possible.  Submit refill requests through [employer] or call your pharmacy and they will forward the refill request to us. Please allow 3 business days for your refill to be completed.          Additional Information About Your Visit        Care EveryWhere ID     This is your Care EveryWhere ID. This could be used by other organizations to access your [place] medical records  [id number]        Your Vitals Were     Pulse Temperature Height Pulse Oximetry BMI (Body Mass Index)       64 97.5  F (36.4  C) (Tympanic) 5' 3\" (1.6 m) 98% 21.08 kg/m2        Blood Pressure from Last 3 Encounters: "   08/31/18 142/84   06/12/17 124/76   02/28/17 124/71    Weight from Last 3 Encounters:   08/31/18 119 lb (54 kg)   06/12/17 117 lb 14.4 oz (53.5 kg)   02/28/17 117 lb 3.2 oz (53.2 kg)              Today, you had the following     No orders found for display         Today's Medication Changes          These changes are accurate as of 8/31/18  2:42 PM.  If you have any questions, ask your nurse or doctor.               Start taking these medicines.        Dose/Directions    lisinopril 5 MG tablet   Commonly known as:  PRINIVIL/ZESTRIL   Used for:  Essential hypertension, benign   Started by:  Deja Lyons APRN CNP        Dose:  5 mg   Take 1 tablet (5 mg) by mouth daily   Quantity:  30 tablet   Refills:  5         These medicines have changed or have updated prescriptions.        Dose/Directions    citalopram 20 MG tablet   Commonly known as:  celeXA   This may have changed:  medication strength   Used for:  Adjustment disorder with mixed anxiety and depressed mood   Changed by:  Deja Lyons APRN CNP        Dose:  20 mg   Take 1 tablet (20 mg) by mouth daily   Quantity:  30 tablet   Refills:  5            Where to get your medicines      These medications were sent to [facility] [address] [place] [address] [address]   [address] [facility] [address]     Phone:  [phone]     citalopram 20 MG tablet    lisinopril 5 MG tablet                Primary Care Provider Office Phone # Fax #    Maryellen NILS Nazario [phone] [address] [facility] [address]        Equal Access to Services     MARGO NUNN AH: Will Aguilar, wahuma ho, qaybta kaalkatharina saavedra. So [facility] [phone].    ATENCIÓN: Si habla español, tiene a rey disposición servicios gratuitos de asistencia lingüística. Ferdinand al [phone].    We comply with applicable federal civil rights laws and [place] laws. We do not  discriminate on the basis of race, color, national origin, age, disability, sex, sexual orientation, or gender identity.            Thank you!     Thank you for choosing [facility]  for your care. Our goal is always to provide you with excellent care. Hearing back from our patients is one way we can continue to improve our services. Please take a few minutes to complete the written survey that you may receive in the mail after your visit with us. Thank you!             Your Updated Medication List - Protect others around you: Learn how to safely use, store and throw away your medicines at www.disposemymeds.org.          This list is accurate as of 8/31/18  2:42 PM.  Always use your most recent med list.                   Brand Name Dispense Instructions for use Diagnosis    ALEVE PO      Take 220 mg by mouth nightly as needed for moderate pain        aspirin 81 MG tablet      1 TABLET DAILY        CALCIUM + D PO      1 tab daily        citalopram 20 MG tablet    celeXA    30 tablet    Take 1 tablet (20 mg) by mouth daily    Adjustment disorder with mixed anxiety and depressed mood       lisinopril 5 MG tablet    PRINIVIL/ZESTRIL    30 tablet    Take 1 tablet (5 mg) by mouth daily    Essential hypertension, benign       MULTIVITAMIN ADULTS PO      Take by mouth daily        omega-3 acid ethyl esters 1 g capsule    Lovaza     Take 2 g by mouth daily        VITAMIN B-12 PO      1 tab daily

## 2018-08-31 NOTE — PROGRESS NOTES
"  SUBJECTIVE:   Gail Cuba is a 78 year old female who presents to clinic today for the following health issues: had high BP when saw dentist yesterday. The patient has history of mild hypertension, in the past was treated with Lisinopril 5 mg daily with good control.   The patient is feeling well today.    Reports having high amount of stress recently, mainly due to family stressors, sometimes needs to babysit grand child with ADHD. In the past was on Celexa for anxiety and depression, feel like she needs to restart antidepressant.     Problem list and histories reviewed & adjusted, as indicated.  Additional history: as documented    BP Readings from Last 3 Encounters:   08/31/18 142/84   06/12/17 124/76   02/28/17 124/71    Wt Readings from Last 3 Encounters:   08/31/18 119 lb (54 kg)   06/12/17 117 lb 14.4 oz (53.5 kg)   02/28/17 117 lb 3.2 oz (53.2 kg)           Reviewed and updated as needed this visit by clinical staff       Reviewed and updated as needed this visit by Provider         ROS:  Constitutional, HEENT, cardiovascular, pulmonary, gi and gu systems are negative, except as otherwise noted.    OBJECTIVE:     /84  Pulse 64  Temp 97.5  F (36.4  C) (Tympanic)  Ht 5' 3\" (1.6 m)  Wt 119 lb (54 kg)  SpO2 98%  BMI 21.08 kg/m2  Body mass index is 21.08 kg/(m^2).  GENERAL: healthy, alert and no distress  CV: regular rate and rhythm, normal S1 S2, no S3 or S4, no murmur, click or rub, no peripheral edema and peripheral pulses strong  PSYCH: mentation appears normal, affect normal/bright    Diagnostic Test Results:  none     ASSESSMENT/PLAN:     1. Essential hypertension, benign    - start lisinopril (PRINIVIL/ZESTRIL) 5 MG tablet; Take 1 tablet (5 mg) by mouth daily  Dispense: 30 tablet; Refill: 5  -monitor BP  -follow up if readings higher than 150/90     2. Adjustment disorder with mixed anxiety and depressed mood  - citalopram (CELEXA) 20 MG tablet; Take 1 tablet (20 mg) by mouth daily  " Dispense: 30 tablet; Refill: 5  -follow up in 6 weeks if symptoms not improving, otherwise in 6 months if feeling better.     See Patient Instructions    NILS Esquivel [facility]

## 2018-09-01 ASSESSMENT — PATIENT HEALTH QUESTIONNAIRE - PHQ9: SUM OF ALL RESPONSES TO PHQ QUESTIONS 1-9: 7

## 2018-09-01 ASSESSMENT — ANXIETY QUESTIONNAIRES: GAD7 TOTAL SCORE: 9

## 2019-04-13 DIAGNOSIS — I10 ESSENTIAL HYPERTENSION, BENIGN: ICD-10-CM

## 2019-04-15 RX ORDER — LISINOPRIL 5 MG/1
TABLET ORAL
Qty: 90 TABLET | Refills: 0 | Status: SHIPPED | OUTPATIENT
Start: 2019-04-15 | End: 2019-07-11

## 2019-04-15 NOTE — TELEPHONE ENCOUNTER
Routing refill request to provider for review/approval because:  Labs not current:  Cr and K+    Thank you    Marilee DIA RN

## 2019-04-15 NOTE — TELEPHONE ENCOUNTER
"Requested Prescriptions   Pending Prescriptions Disp Refills     lisinopril (PRINIVIL/ZESTRIL) 5 MG tablet [Pharmacy Med Name: LISINOPRIL 5MG      TAB] 30 tablet 5     Sig: TAKE 1 TABLET BY MOUTH ONCE DAILY   Last Written Prescription Date:  8/31/18  Last Fill Quantity: 30 tab,  # refills: 5   Last office visit: 8/31/2018 with prescribing provider:  Serena   Future Office Visit:        ACE Inhibitors (Including Combos) Protocol Failed - 4/13/2019  8:01 AM        Failed - Blood pressure under 140/90 in past 12 months     BP Readings from Last 3 Encounters:   08/31/18 142/84   06/12/17 124/76   02/28/17 124/71                 Failed - Normal serum creatinine on file in past 12 months     Recent Labs   Lab Test 02/28/17  1045  07/07/15  1241   CR 0.65   < >  --    CREAT  --   --  0.7    < > = values in this interval not displayed.             Failed - Normal serum potassium on file in past 12 months     Recent Labs   Lab Test 02/28/17  1045   POTASSIUM 4.2             Passed - Recent (12 mo) or future (30 days) visit within the authorizing provider's specialty     Patient had office visit in the last 12 months or has a visit in the next 30 days with authorizing provider or within the authorizing provider's specialty.  See \"Patient Info\" tab in inbasket, or \"Choose Columns\" in Meds & Orders section of the refill encounter.              Passed - Medication is active on med list        Passed - Patient is age 18 or older        Passed - No active pregnancy on record        Passed - No positive pregnancy test within past 12 months          "

## 2019-04-15 NOTE — TELEPHONE ENCOUNTER
Refilled for 3 months, follow up for BP recheck and labs, office visit please    NILS Esquivel CNP

## 2019-04-15 NOTE — TELEPHONE ENCOUNTER
Left message for patient to check pharmacy and call the [facility] clinic back to schedule appt with her PCP for further refills.    Toña FOY Rn

## 2019-07-11 ENCOUNTER — OFFICE VISIT (OUTPATIENT)
Dept: FAMILY MEDICINE | Facility: CLINIC | Age: 79
End: 2019-07-11
Payer: COMMERCIAL

## 2019-07-11 VITALS
TEMPERATURE: 97.9 F | DIASTOLIC BLOOD PRESSURE: 82 MMHG | BODY MASS INDEX: 18.78 KG/M2 | HEIGHT: 63 IN | RESPIRATION RATE: 16 BRPM | SYSTOLIC BLOOD PRESSURE: 112 MMHG | HEART RATE: 61 BPM | OXYGEN SATURATION: 99 % | WEIGHT: 106 LBS

## 2019-07-11 DIAGNOSIS — I10 ESSENTIAL HYPERTENSION, BENIGN: ICD-10-CM

## 2019-07-11 DIAGNOSIS — Z13.6 CARDIOVASCULAR SCREENING; LDL GOAL LESS THAN 100: Primary | ICD-10-CM

## 2019-07-11 DIAGNOSIS — F43.23 ADJUSTMENT DISORDER WITH MIXED ANXIETY AND DEPRESSED MOOD: ICD-10-CM

## 2019-07-11 PROCEDURE — 99214 OFFICE O/P EST MOD 30 MIN: CPT | Performed by: NURSE PRACTITIONER

## 2019-07-11 RX ORDER — LISINOPRIL 5 MG/1
5 TABLET ORAL DAILY
Qty: 90 TABLET | Refills: 3 | Status: SHIPPED | OUTPATIENT
Start: 2019-07-11 | End: 2019-10-10

## 2019-07-11 RX ORDER — MULTIVIT-MIN/FOLIC ACID/BIOTIN 66.7 MCG
2 TABLET ORAL DAILY
COMMUNITY
End: 2023-01-25

## 2019-07-11 RX ORDER — CITALOPRAM HYDROBROMIDE 20 MG/1
30 TABLET ORAL DAILY
Qty: 135 TABLET | Refills: 0 | Status: SHIPPED | OUTPATIENT
Start: 2019-07-11 | End: 2019-10-10

## 2019-07-11 ASSESSMENT — MIFFLIN-ST. JEOR: SCORE: 917

## 2019-07-11 ASSESSMENT — PATIENT HEALTH QUESTIONNAIRE - PHQ9: SUM OF ALL RESPONSES TO PHQ QUESTIONS 1-9: 7

## 2019-07-11 NOTE — PROGRESS NOTES
Subjective     Gail Cuba is a 79 year old female who presents to clinic today for the following health issues:  Chief Complaint   Patient presents with     Hypertension     follow up, renew meds     Anxiety     follow up, discuss meds     LAB REQUEST     patient would like fasting labs ordered for future, she is due . Not fasting today .      Health Maintenance     Declined TD and Prevnar 13         Hypertension Follow-up      Do you check your blood pressure regularly outside of the clinic? No     Are you following a low salt diet? No    Are your blood pressures ever more than 140 on the top number (systolic) OR more   than 90 on the bottom number (diastolic), for example 140/90? Yes  Depression and Anxiety Follow-Up    How are you doing with your depression since your last visit? No change- patient would like to increase medication as she feels it would help her symptoms although she is vague in describing her symptoms.     How are you doing with your anxiety since your last visit?  Worsened     Are you having other symptoms that might be associated with depression or anxiety? Yes:  Issues with sleep     Have you had a significant life event? Family Stress    Do you have any concerns with your use of alcohol or other drugs? No    Social History     Tobacco Use     Smoking status: Never Smoker     Smokeless tobacco: Never Used   Substance Use Topics     Alcohol use: Yes     Comment: occasional     Drug use: No     PHQ 11/19/2015 2/28/2017 8/31/2018   PHQ-9 Total Score 6 3 7   Q9: Thoughts of better off dead/self-harm past 2 weeks Not at all Not at all Not at all     MARTIN-7 SCORE 11/19/2015 2/28/2017 8/31/2018   Total Score - - -   Total Score 3 4 9       Suicide Assessment Five-step Evaluation and Treatment (SAFE-T)    Amount of exercise or physical activity: None    Problems taking medications regularly: Yes,  problems remembering to take    Medication side effects: none    Diet: regular (no  restrictions)      -------------------------------------    Patient Active Problem List   Diagnosis     Osteoporosis     Breast cancer (H)     THICKENING ENDOMETRIUM     Back pain     Hyperlipidemia LDL goal <130     Urge incontinence of urine     Mild major depression (H)     UTI (urinary tract infection)     Health Care Home     Anxiety     Loose stools     Dysuria     Endometrial mass     Osteoporosis     Past Surgical History:   Procedure Laterality Date     BIOPSY BREAST       BREAST LUMPECTOMY, RT/LT  1-26-09    Breast Lumpectomy RT     COLONOSCOPY  7/25/2013    Procedure: COLONOSCOPY;  Colonoscopy  ;  Surgeon: Deep Purcell MD;  Location: [facility]     D & C  5/24/10    for EMB      DILATION AND CURETTAGE, OPERATIVE HYSTEROSCOPY, COMBINED  1/14/2014    Procedure: COMBINED DILATION AND CURETTAGE, OPERATIVE HYSTEROSCOPY;  Hysteroscopy with dilation and curettage, possible polypectomy- choice anes  ;  Surgeon: Lauryn Hoffman MD;  Location: [facility]      EXCISION BREAST LESION, OPEN >=1  6-13-08    with sentinal node biopsy     LUMPECTOMY BREAST  2009    right breast     SURGICAL HISTORY OF -       Neck surgery.     SURGICAL HISTORY OF -   11/17/09    L4-5 back surgery       Social History     Tobacco Use     Smoking status: Never Smoker     Smokeless tobacco: Never Used   Substance Use Topics     Alcohol use: Yes     Comment: occasional     Family History   Problem Relation Age of Onset     Alcohol/Drug Mother         alcohalic     Hypertension Mother      Cerebrovascular Disease Mother      Thyroid Disease Sister      Musculoskeletal Disorder Son      Hypertension Son      Diabetes Father      Prostate Cancer Father      Cancer - colorectal Maternal Grandfather      Asthma No family hx of      C.A.D. No family hx of      Breast Cancer No family hx of      Anesthesia Reaction No family hx of      Blood Disease No family hx of      Ovarian Cancer No family hx of       "      -------------------------------------  Reviewed and updated as needed this visit by Provider         Review of Systems   ROS COMP: Constitutional, HEENT, cardiovascular, pulmonary, GI, , musculoskeletal, neuro, skin, endocrine and psych systems are negative, except as otherwise noted.      Objective    /82 (BP Location: Left arm, Patient Position: Chair, Cuff Size: Adult Regular)   Pulse 61   Temp 97.9  F (36.6  C) (Tympanic)   Resp 16   Ht 1.588 m (5' 2.5\")   Wt 48.1 kg (106 lb)   SpO2 99%   Breastfeeding? No   BMI 19.08 kg/m    Body mass index is 19.08 kg/m .  Physical Exam   GENERAL: healthy, alert and no distress  RESP: lungs clear to auscultation - no rales, rhonchi or wheezes  CV: regular rate and rhythm, normal S1 S2, no S3 or S4, no murmur, click or rub, no peripheral edema and peripheral pulses strong  MS: no gross musculoskeletal defects noted, no edema  PSYCH: mentation appears normal, affect normal/bright    Diagnostic Test Results:  Labs reviewed in Epic        Assessment & Plan     1. Adjustment disorder with mixed anxiety and depressed mood  Patient would like to increase from 20 mg to 30 mg- which based on PHQ9 I agree with  - follow up in 3 months  - citalopram (CELEXA) 20 MG tablet; Take 1.5 tablets (30 mg) by mouth daily  Dispense: 135 tablet; Refill: 0    2. Essential hypertension, benign  controlled  - lisinopril (PRINIVIL/ZESTRIL) 5 MG tablet; Take 1 tablet (5 mg) by mouth daily  Dispense: 90 tablet; Refill: 3  - Basic metabolic panel; Future    3. CARDIOVASCULAR SCREENING; LDL GOAL LESS THAN 100  Patient is requesting cholesterol check- last done 4 yrs ago  - Lipid panel reflex to direct LDL Fasting; Future     Follow up in 3 months    No follow-ups on file.    NILS Ceballos [facility]      "

## 2019-07-11 NOTE — PATIENT INSTRUCTIONS
Thank you for choosing [facility].  You may be receiving an email and/or telephone survey request from [facility] Experience regarding your visit today.  Please take a few minutes to respond to the survey to let us know how we are doing.      If you have questions or concerns, please contact us via WonderHowTo or you can contact your care team at [phone].    Our Clinic hours are:  Monday 6:40 am  to 7:00 pm  Tuesday -Friday 6:40 am to 5:00 pm    The [place] outpatient lab hours are:  Monday - Friday 6:10 am to 4:45 pm  Saturdays 7:00 am to 11:00 am  Appointments are required, call [phone]    If you have clinical questions after hours or would like to schedule an appointment,  call the clinic at [phone].

## 2019-07-19 DIAGNOSIS — Z13.6 CARDIOVASCULAR SCREENING; LDL GOAL LESS THAN 100: ICD-10-CM

## 2019-07-19 DIAGNOSIS — I10 ESSENTIAL HYPERTENSION, BENIGN: ICD-10-CM

## 2019-07-19 LAB
ANION GAP SERPL CALCULATED.3IONS-SCNC: 1 MMOL/L (ref 3–14)
BUN SERPL-MCNC: 14 MG/DL (ref 7–30)
CALCIUM SERPL-MCNC: 9.1 MG/DL (ref 8.5–10.1)
CHLORIDE SERPL-SCNC: 104 MMOL/L (ref 94–109)
CHOLEST SERPL-MCNC: 228 MG/DL
CO2 SERPL-SCNC: 33 MMOL/L (ref 20–32)
CREAT SERPL-MCNC: 0.66 MG/DL (ref 0.52–1.04)
GFR SERPL CREATININE-BSD FRML MDRD: 84 ML/MIN/{1.73_M2}
GLUCOSE SERPL-MCNC: 90 MG/DL (ref 70–99)
HDLC SERPL-MCNC: 79 MG/DL
LDLC SERPL CALC-MCNC: 118 MG/DL
NONHDLC SERPL-MCNC: 149 MG/DL
POTASSIUM SERPL-SCNC: 4.2 MMOL/L (ref 3.4–5.3)
SODIUM SERPL-SCNC: 138 MMOL/L (ref 133–144)
TRIGL SERPL-MCNC: 155 MG/DL

## 2019-07-19 PROCEDURE — 36415 COLL VENOUS BLD VENIPUNCTURE: CPT | Performed by: NURSE PRACTITIONER

## 2019-07-19 PROCEDURE — 80061 LIPID PANEL: CPT | Performed by: NURSE PRACTITIONER

## 2019-07-19 PROCEDURE — 80048 BASIC METABOLIC PNL TOTAL CA: CPT | Performed by: NURSE PRACTITIONER

## 2019-10-10 ENCOUNTER — OFFICE VISIT (OUTPATIENT)
Dept: FAMILY MEDICINE | Facility: CLINIC | Age: 79
End: 2019-10-10
Payer: COMMERCIAL

## 2019-10-10 VITALS
WEIGHT: 105.6 LBS | TEMPERATURE: 98.2 F | BODY MASS INDEX: 18.71 KG/M2 | RESPIRATION RATE: 14 BRPM | SYSTOLIC BLOOD PRESSURE: 128 MMHG | HEART RATE: 64 BPM | DIASTOLIC BLOOD PRESSURE: 76 MMHG | OXYGEN SATURATION: 98 % | HEIGHT: 63 IN

## 2019-10-10 DIAGNOSIS — F41.9 ANXIETY: Primary | ICD-10-CM

## 2019-10-10 DIAGNOSIS — F33.41 RECURRENT MAJOR DEPRESSIVE DISORDER, IN PARTIAL REMISSION (H): ICD-10-CM

## 2019-10-10 DIAGNOSIS — I10 ESSENTIAL HYPERTENSION, BENIGN: ICD-10-CM

## 2019-10-10 PROCEDURE — 99214 OFFICE O/P EST MOD 30 MIN: CPT | Performed by: NURSE PRACTITIONER

## 2019-10-10 RX ORDER — LISINOPRIL 5 MG/1
5 TABLET ORAL DAILY
Qty: 90 TABLET | Refills: 3 | Status: SHIPPED | OUTPATIENT
Start: 2019-10-10 | End: 2020-11-16

## 2019-10-10 RX ORDER — CITALOPRAM HYDROBROMIDE 20 MG/1
30 TABLET ORAL DAILY
Qty: 135 TABLET | Refills: 3 | Status: SHIPPED | OUTPATIENT
Start: 2019-10-10 | End: 2020-11-16

## 2019-10-10 ASSESSMENT — ANXIETY QUESTIONNAIRES
7. FEELING AFRAID AS IF SOMETHING AWFUL MIGHT HAPPEN: NOT AT ALL
IF YOU CHECKED OFF ANY PROBLEMS ON THIS QUESTIONNAIRE, HOW DIFFICULT HAVE THESE PROBLEMS MADE IT FOR YOU TO DO YOUR WORK, TAKE CARE OF THINGS AT HOME, OR GET ALONG WITH OTHER PEOPLE: NOT DIFFICULT AT ALL
GAD7 TOTAL SCORE: 4
6. BECOMING EASILY ANNOYED OR IRRITABLE: SEVERAL DAYS
2. NOT BEING ABLE TO STOP OR CONTROL WORRYING: SEVERAL DAYS
3. WORRYING TOO MUCH ABOUT DIFFERENT THINGS: NOT AT ALL
5. BEING SO RESTLESS THAT IT IS HARD TO SIT STILL: NOT AT ALL
1. FEELING NERVOUS, ANXIOUS, OR ON EDGE: SEVERAL DAYS

## 2019-10-10 ASSESSMENT — PATIENT HEALTH QUESTIONNAIRE - PHQ9
SUM OF ALL RESPONSES TO PHQ QUESTIONS 1-9: 3
5. POOR APPETITE OR OVEREATING: SEVERAL DAYS

## 2019-10-10 ASSESSMENT — MIFFLIN-ST. JEOR: SCORE: 915.19

## 2019-10-10 NOTE — PROGRESS NOTES
Subjective     Gail Cuba is a 79 year old female who presents to clinic today for the following health issues:    HPI   Anxiety Follow-Up    How are you doing with your anxiety since your last visit? Improved on the new dose of Citalopram, she increased from 20mg to 30mg    Are you having other symptoms that might be associated with anxiety? No    Have you had a significant life event? OTHER: family strife     Are you feeling depressed? No    Do you have any concerns with your use of alcohol or other drugs? No    Social History     Tobacco Use     Smoking status: Never Smoker     Smokeless tobacco: Never Used   Substance Use Topics     Alcohol use: Yes     Comment: occasional     Drug use: No     MARTIN-7 SCORE 11/19/2015 2/28/2017 8/31/2018   Total Score - - -   Total Score 3 4 9     PHQ 2/28/2017 8/31/2018 7/11/2019   PHQ-9 Total Score 3 7 7   Q9: Thoughts of better off dead/self-harm past 2 weeks Not at all Not at all Not at all     No flowsheet data found.  No flowsheet data found.        How many servings of fruits and vegetables do you eat daily?  0-1    On average, how many sweetened beverages do you drink each day (soda, juice, sweet tea, etc)?   0    How many days per week do you miss taking your medication? 0        Hypertension Follow-up      Do you check your blood pressure regularly outside of the clinic? No     Are you following a low salt diet? No    Are your blood pressures ever more than 140 on the top number (systolic) OR more   than 90 on the bottom number (diastolic), for example 140/90? No    Patient Active Problem List   Diagnosis     Osteoporosis     Breast cancer (H)     THICKENING ENDOMETRIUM     Back pain     Hyperlipidemia LDL goal <130     Urge incontinence of urine     Mild major depression (H)     UTI (urinary tract infection)     Health Care Home     Anxiety     Loose stools     Dysuria     Endometrial mass     Osteoporosis     Past Surgical History:   Procedure Laterality Date      BIOPSY BREAST       BREAST LUMPECTOMY, RT/LT  1-26-09    Breast Lumpectomy RT     COLONOSCOPY  7/25/2013    Procedure: COLONOSCOPY;  Colonoscopy  ;  Surgeon: Deep Purcell MD;  Location: [facility]     D & C  5/24/10    for EMB      DILATION AND CURETTAGE, OPERATIVE HYSTEROSCOPY, COMBINED  1/14/2014    Procedure: COMBINED DILATION AND CURETTAGE, OPERATIVE HYSTEROSCOPY;  Hysteroscopy with dilation and curettage, possible polypectomy- choice anes  ;  Surgeon: Lauryn Hoffman MD;  Location: [facility]      EXCISION BREAST LESION, OPEN >=1  6-13-08    with sentinal node biopsy     LUMPECTOMY BREAST  2009    right breast     SURGICAL HISTORY OF -       Neck surgery.     SURGICAL HISTORY OF -   11/17/09    L4-5 back surgery       Social History     Tobacco Use     Smoking status: Never Smoker     Smokeless tobacco: Never Used   Substance Use Topics     Alcohol use: Yes     Comment: occasional     Family History   Problem Relation Age of Onset     Alcohol/Drug Mother         alcohalic     Hypertension Mother      Cerebrovascular Disease Mother      Thyroid Disease Sister      Musculoskeletal Disorder Son      Hypertension Son      Diabetes Father      Prostate Cancer Father      Cancer - colorectal Maternal Grandfather      Asthma No family hx of      C.A.D. No family hx of      Breast Cancer No family hx of      Anesthesia Reaction No family hx of      Blood Disease No family hx of      Ovarian Cancer No family hx of            -------------------------------------  Reviewed and updated as needed this visit by Provider         Review of Systems   ROS COMP: Constitutional, HEENT, cardiovascular, pulmonary, GI, , musculoskeletal, neuro, skin, endocrine and psych systems are negative, except as otherwise noted.      Objective    There were no vitals taken for this visit.  There is no height or weight on file to calculate BMI.  Physical Exam   GENERAL: healthy, alert and no distress  RESP: lungs clear to  auscultation - no rales, rhonchi or wheezes  CV: regular rate and rhythm, normal S1 S2, no S3 or S4, no murmur, click or rub, no peripheral edema and peripheral pulses strong  MS: no gross musculoskeletal defects noted, no edema    Diagnostic Test Results:  Labs reviewed in Epic        Assessment & Plan     1. 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 Anxiety-   stable  Refilled Citalopram       Follow up in 1 year    No follow-ups on file.    NILS Ceballos [facility]

## 2019-10-10 NOTE — PATIENT INSTRUCTIONS
Thank you for choosing [facility].  You may be receiving an email and/or telephone survey request from [facility] Customer Experience regarding your visit today.  Please take a few minutes to respond to the survey to let us know how we are doing.      If you have questions or concerns, please contact us via [employer] or you can contact your care team at [phone].    Our Clinic hours are:  Monday 6:40 am  to 7:00 pm  Tuesday -Friday 6:40 am to 5:00 pm    The [place] outpatient lab hours are:  Monday - Friday 6:10 am to 4:45 pm  Saturdays 7:00 am to 11:00 am  Appointments are required, call [phone]    If you have clinical questions after hours or would like to schedule an appointment,  call the clinic at [phone].

## 2019-10-11 ASSESSMENT — ANXIETY QUESTIONNAIRES: GAD7 TOTAL SCORE: 4

## 2020-02-11 ENCOUNTER — ANESTHESIA EVENT (OUTPATIENT)
Dept: SURGERY | Facility: CLINIC | Age: 80
End: 2020-02-11
Payer: COMMERCIAL

## 2020-02-11 NOTE — H&P
[facility]  [facility]  [address]  [facility] [address]  [phone]  Dept: [phone]    OPHTHALMOLOGY PRE-OPERATIVE  HISTORY AND PHYSICAL    DATE OF H/P:  2020    DATE OF SURGERY:  2020  PROCEDURE:  Procedure(s):  Cataract Removal with Implant, Left Eye  LENS IMPLANT:  [id number] +19.0  REFRACTIVE GOAL:  PL Sph  SURGEON:  Jose Cerrato MD    ANESTHESIA:  TOPICAL / MAC    OR CASE REQUIREMENTS:    DEMOGRAPHICS:  Demographic Information on Gail Cbua:    Gail Cuba  Gender: female  : 1940  [address]  [place] [id number] [phone] (home)     Medical Record: [id number]  Social Security Number: [id number]  Pharmacy: [facility] [address] [place] [address] [address]  Primary Care Provider: Maryellen Barillas    Parent's names are: Data Unavailable (mother) and Data Unavailable (father).    Insurance: Payor: [employer] / Plan: [facility] MEDICARE NON [employer] / Product Type: HMO /     OCULAR HISTORY:  Cataracts, each eye.    HISTORIES:  Past Medical History:   Diagnosis Date     Anxiety      Breast cancer (H) 2009    Right breast cancer     Breast mass      Chest pain      Hypertension      MEDICAL HISTORY OF -     Rt breast cancer treated with radiation     Urge incontinence        Past Surgical History:   Procedure Laterality Date     BIOPSY BREAST       BREAST LUMPECTOMY, RT/LT  09    Breast Lumpectomy RT     COLONOSCOPY  2013    Procedure: COLONOSCOPY;  Colonoscopy  ;  Surgeon: Deep Purcell MD;  Location: [facility]     D & C  5/24/10    for EMB      DILATION AND CURETTAGE, OPERATIVE HYSTEROSCOPY, COMBINED  2014    Procedure: COMBINED DILATION AND CURETTAGE, OPERATIVE HYSTEROSCOPY;  Hysteroscopy with dilation and curettage, possible polypectomy- choice anes  ;  Surgeon: Lauryn Hoffman MD;  Location: [facility]      EXCISION BREAST LESION, OPEN >=1  08    with sentinal node biopsy      LUMPECTOMY BREAST  2009    right breast     SURGICAL HISTORY OF -       Neck surgery.     SURGICAL HISTORY OF -   11/17/09    L4-5 back surgery       Family History   Problem Relation Age of Onset     Alcohol/Drug Mother         alcohalic     Hypertension Mother      Cerebrovascular Disease Mother      Thyroid Disease Sister      Musculoskeletal Disorder Son      Hypertension Son      Diabetes Father      Prostate Cancer Father      Cancer - colorectal Maternal Grandfather      Asthma No family hx of      C.A.D. No family hx of      Breast Cancer No family hx of      Anesthesia Reaction No family hx of      Blood Disease No family hx of      Ovarian Cancer No family hx of        Social History     Tobacco Use     Smoking status: Never Smoker     Smokeless tobacco: Never Used   Substance Use Topics     Alcohol use: Yes     Comment: occasional       MEDICATIONS:  No current facility-administered medications for this encounter.      Current Outpatient Medications   Medication Sig     ASPIRIN 81 MG OR TABS 1 TABLET DAILY     CALCIUM + D OR 1 tab daily     citalopram (CELEXA) 20 MG tablet Take 1.5 tablets (30 mg) by mouth daily     cod liver oil CAPS capsule Take 1 capsule by mouth daily     lisinopril (PRINIVIL/ZESTRIL) 5 MG tablet Take 1 tablet (5 mg) by mouth daily     Multiple Vitamins-Minerals (HAIR/SKIN/NAILS) TABS Take 2 tablets by mouth daily     Multiple Vitamins-Minerals (MULTIVITAMIN ADULTS PO) Take by mouth daily     Naproxen Sodium (ALEVE PO) Take 220 mg by mouth nightly as needed for moderate pain     VITAMIN B-12 OR 1 tab daily       ALLERGIES:     Allergies   Allergen Reactions     Latex      Blistery rash from gloves     Nkda [No Known Drug Allergies]        PERTINENT SYSTEMS REVIEW:    1. No - Do you have a history of heart attack, stroke, stent, bypass or surgery on an artery in the head, neck, heart or legs?  2. No - Do you ever have any pain or discomfort in your chest?  3. No - Do you have a history  of  Heart Failure?  4. No - Are you troubled by shortness of breath when walking: On the level, up a slight hill or at night?  5. No - Do you currently have a cold, bronchitis or other respiratory infection?  6. No - Do you have a cough, shortness of breath or wheezing?  7. No - Do you sometimes get pains in the calves of your legs when you walk?  8. No - Do you or anyone in your family have previous history of blood clots?  9. No - Do you or does anyone in your family have a serious bleeding problem such as prolonged bleeding following surgeries or cuts?  10. No - Have you ever had problems with anemia or been told to take iron pills?  11. No - Have you had any abnormal blood loss such as black, tarry or bloody stools, or abnormal vaginal bleeding?  12. No - Have you ever had a blood transfusion?  13. No - Have you or any of your relatives ever had problems with anesthesia?  14. No - Do you have sleep apnea, excessive snoring or daytime drowsiness?  15. No - Do you have any prosthetic heart valves?  16. No - Do you have prosthetic joints?    EXAMINATION:  Vitals were reviewed  Vison:  Va, right - 20/30, left - 20/40;   BAT, right - 20/70, left - 20/80;  HEENT:  Cataract, otherwise unremarkable.  LUNGS:  Clear  CV:  Regular rate and rhythm without murmur  ABD:  Soft and nontender  NEURO:  Alert and nonfocal    IMPRESSION:  Patient cleared for ophthalmic surgery.  Low risk with monitored, light sedation.  I have assessed the patient's DVT risk, and no additional orders necessary.    PLAN:  Procedure(s):  Cataract Removal with Implant, Left Eye      Jose Cerrato MD

## 2020-02-11 NOTE — ANESTHESIA PREPROCEDURE EVALUATION
Anesthesia Evaluation     . Pt has had prior anesthetic. Type: MAC and General           ROS/MED HX    ENT/Pulmonary:  - neg pulmonary ROS     Neurologic:  - neg neurologic ROS     Cardiovascular:     (+) hypertension----. : . . . :. .       METS/Exercise Tolerance:     Hematologic:  - neg hematologic  ROS       Musculoskeletal:   (+) arthritis,  -       GI/Hepatic:  - neg GI/hepatic ROS       Renal/Genitourinary:  - ROS Renal section negative       Endo:  - neg endo ROS       Psychiatric:     (+) psychiatric history anxiety and depression      Infectious Disease:  - neg infectious disease ROS       Malignancy:      - no malignancy   Other:    - neg other ROS                 Physical Exam  Normal systems: cardiovascular and pulmonary    Airway   Mallampati: II  TM distance: >3 FB  Neck ROM: full    Dental   (+) upper dentures and lower dentures    Cardiovascular       Pulmonary    breath sounds clear to auscultation                    Anesthesia Plan      History & Physical Review  History and physical reviewed and following examination; no interval change.    ASA Status:  2 .    NPO Status:  > 6 hours    Plan for MAC Reason for MAC:  Other - see comments         Postoperative Care      Consents  Anesthetic plan, risks, benefits and alternatives discussed with:  Patient or representative and Patient..                            .    Anesthesia Plan      History & Physical Review  History and physical reviewed and following examination; no interval change.    ASA Status:  2 .    NPO Status:  > 6 hours    Plan for MAC Reason for MAC:  Other - see comments         Postoperative Care      Consents  Anesthetic plan, risks, benefits and alternatives discussed with:  Patient or representative and Patient..

## 2020-02-12 ENCOUNTER — ANESTHESIA (OUTPATIENT)
Dept: SURGERY | Facility: CLINIC | Age: 80
End: 2020-02-12
Payer: COMMERCIAL

## 2020-02-12 ENCOUNTER — HOSPITAL ENCOUNTER (OUTPATIENT)
Facility: CLINIC | Age: 80
Discharge: HOME | End: 2020-02-12
Attending: OPHTHALMOLOGY | Admitting: OPHTHALMOLOGY
Payer: COMMERCIAL

## 2020-02-12 VITALS
TEMPERATURE: 45.1 F | SYSTOLIC BLOOD PRESSURE: 116 MMHG | OXYGEN SATURATION: 99 % | HEIGHT: 63 IN | BODY MASS INDEX: 21.26 KG/M2 | DIASTOLIC BLOOD PRESSURE: 78 MMHG | RESPIRATION RATE: 16 BRPM | WEIGHT: 120 LBS

## 2020-02-12 PROCEDURE — 25000125 ZZHC RX 250: Performed by: OPHTHALMOLOGY

## 2020-02-12 PROCEDURE — 25800030 ZZH RX IP 258 OP 636: Performed by: NURSE ANESTHETIST, CERTIFIED REGISTERED

## 2020-02-12 PROCEDURE — 25000128 H RX IP 250 OP 636: Performed by: OPHTHALMOLOGY

## 2020-02-12 PROCEDURE — 37000012 ZZH ANESTHESIA CATARACT PACKAGE: Performed by: OPHTHALMOLOGY

## 2020-02-12 PROCEDURE — 25000125 ZZHC RX 250: Performed by: NURSE ANESTHETIST, CERTIFIED REGISTERED

## 2020-02-12 PROCEDURE — 36000025 ZZH CATARACT SURGICAL PACKAGE: Performed by: OPHTHALMOLOGY

## 2020-02-12 PROCEDURE — 25000128 H RX IP 250 OP 636: Performed by: NURSE ANESTHETIST, CERTIFIED REGISTERED

## 2020-02-12 PROCEDURE — 71000022 ZZH RECOVERY CATRACT PACKAGE: Performed by: OPHTHALMOLOGY

## 2020-02-12 PROCEDURE — V2632 POST CHMBR INTRAOCULAR LENS: HCPCS | Performed by: OPHTHALMOLOGY

## 2020-02-12 DEVICE — EYE IMP IOL AMO PCL TECNIS ZCB00 19.0: Type: IMPLANTABLE DEVICE | Site: EYE | Status: FUNCTIONAL

## 2020-02-12 RX ORDER — SODIUM CHLORIDE, SODIUM LACTATE, POTASSIUM CHLORIDE, CALCIUM CHLORIDE 600; 310; 30; 20 MG/100ML; MG/100ML; MG/100ML; MG/100ML
INJECTION, SOLUTION INTRAVENOUS CONTINUOUS
Status: DISCONTINUED | OUTPATIENT
Start: 2020-02-12 | End: 2020-02-12 | Stop reason: HOSPADM

## 2020-02-12 RX ORDER — PHENYLEPHRINE HYDROCHLORIDE 25 MG/ML
1 SOLUTION/ DROPS OPHTHALMIC
Status: COMPLETED | OUTPATIENT
Start: 2020-02-12 | End: 2020-02-12

## 2020-02-12 RX ORDER — BALANCED SALT SOLUTION 6.4; .75; .48; .3; 3.9; 1.7 MG/ML; MG/ML; MG/ML; MG/ML; MG/ML; MG/ML
SOLUTION OPHTHALMIC PRN
Status: DISCONTINUED | OUTPATIENT
Start: 2020-02-12 | End: 2020-02-12 | Stop reason: HOSPADM

## 2020-02-12 RX ORDER — LIDOCAINE 40 MG/G
CREAM TOPICAL
Status: DISCONTINUED | OUTPATIENT
Start: 2020-02-12 | End: 2020-02-12 | Stop reason: HOSPADM

## 2020-02-12 RX ORDER — TROPICAMIDE 10 MG/ML
1 SOLUTION/ DROPS OPHTHALMIC
Status: COMPLETED | OUTPATIENT
Start: 2020-02-12 | End: 2020-02-12

## 2020-02-12 RX ORDER — PROPARACAINE HYDROCHLORIDE 5 MG/ML
SOLUTION/ DROPS OPHTHALMIC PRN
Status: DISCONTINUED | OUTPATIENT
Start: 2020-02-12 | End: 2020-02-12 | Stop reason: HOSPADM

## 2020-02-12 RX ORDER — CYCLOPENTOLATE HYDROCHLORIDE 10 MG/ML
1 SOLUTION/ DROPS OPHTHALMIC
Status: COMPLETED | OUTPATIENT
Start: 2020-02-12 | End: 2020-02-12

## 2020-02-12 RX ORDER — LIDOCAINE HYDROCHLORIDE 20 MG/ML
JELLY TOPICAL PRN
Status: DISCONTINUED | OUTPATIENT
Start: 2020-02-12 | End: 2020-02-12 | Stop reason: HOSPADM

## 2020-02-12 RX ADMIN — PHENYLEPHRINE HYDROCHLORIDE 1 DROP: 25 SOLUTION/ DROPS OPHTHALMIC at 09:26

## 2020-02-12 RX ADMIN — TROPICAMIDE 1 DROP: 10 SOLUTION/ DROPS OPHTHALMIC at 09:32

## 2020-02-12 RX ADMIN — LIDOCAINE HYDROCHLORIDE 0.1 ML: 10 INJECTION, SOLUTION EPIDURAL; INFILTRATION; INTRACAUDAL; PERINEURAL at 09:23

## 2020-02-12 RX ADMIN — PHENYLEPHRINE HYDROCHLORIDE 1 DROP: 25 SOLUTION/ DROPS OPHTHALMIC at 09:19

## 2020-02-12 RX ADMIN — TROPICAMIDE 1 DROP: 10 SOLUTION/ DROPS OPHTHALMIC at 09:25

## 2020-02-12 RX ADMIN — CYCLOPENTOLATE HYDROCHLORIDE 1 DROP: 10 SOLUTION/ DROPS OPHTHALMIC at 09:25

## 2020-02-12 RX ADMIN — MIDAZOLAM 2 MG: 1 INJECTION INTRAMUSCULAR; INTRAVENOUS at 10:11

## 2020-02-12 RX ADMIN — TROPICAMIDE 1 DROP: 10 SOLUTION/ DROPS OPHTHALMIC at 09:18

## 2020-02-12 RX ADMIN — PHENYLEPHRINE HYDROCHLORIDE 1 DROP: 25 SOLUTION/ DROPS OPHTHALMIC at 09:33

## 2020-02-12 RX ADMIN — SODIUM CHLORIDE, POTASSIUM CHLORIDE, SODIUM LACTATE AND CALCIUM CHLORIDE: 600; 310; 30; 20 INJECTION, SOLUTION INTRAVENOUS at 09:50

## 2020-02-12 RX ADMIN — SODIUM CHLORIDE, POTASSIUM CHLORIDE, SODIUM LACTATE AND CALCIUM CHLORIDE: 600; 310; 30; 20 INJECTION, SOLUTION INTRAVENOUS at 09:22

## 2020-02-12 RX ADMIN — CYCLOPENTOLATE HYDROCHLORIDE 1 DROP: 10 SOLUTION/ DROPS OPHTHALMIC at 09:18

## 2020-02-12 RX ADMIN — CYCLOPENTOLATE HYDROCHLORIDE 1 DROP: 10 SOLUTION/ DROPS OPHTHALMIC at 09:32

## 2020-02-12 ASSESSMENT — MIFFLIN-ST. JEOR: SCORE: 983.45

## 2020-02-12 NOTE — OP NOTE
CATARACT OPERATIVE NOTE    PATIENT: Gail Cuba  DATE OF SURGERY: 2/12/2020  PREOPERATIVE DIAGNOSIS:  Senile Nuclear Cataract, Left eye  POSTOPERATIVE DIAGNOSIS:  Senile Nuclear Cataract, Left eye  OPERATIVE PROCEDURE:  Phacoemulsification and placement of intraocular lens  SURGEON:  Jose Cerrato MD  ANESTHESIA:  Topical / MAC  EBL:  None  SPECIMENS:  None  COMPLICATIONS:  None    PROCEDURE:  The patient was brought to the operating room at [facility].  The left eye was prepped and draped in the usual fashion for cataract surgery.  A wire lid speculum was inserted.  A super sharp blade was used to make a paracentesis at the 5 O'clock position.  The super sharp blade was used to make a partial thickness temporal groove, which was 3 mm in length.  0.8 mL of non-preserved epi-Shugarcaine was injected into the anterior chamber.  Viscoelastic was used to inflate the anterior chamber through a cannula.  A 2.5 mm microkeratome was used to make a temporal clear corneal incision in a two-plane fashion.  A cystotome needle and forceps were used to make a capsulorrhexis.  Hydrodissection and hydrodelineation were performed with Balance Salt Solution.  The lens was then phacoemulsified and removed without complications.  The cortical material was removed with bimanual irrigation and aspiration.  The capsular bag was filled with viscoelastic.  A posterior chamber intraocular lens, preselected and recorded, was folded and inserted into the capsular bag.  The viscoelastic was removed with the irrigation and aspiration tip.  Balanced Salt Solution with Vigamox, 150mg/0.1mL, was used to refill the anterior chamber.  The wounds were checked for water tightness and required no suture.  The wire lid speculum was removed.  The patient's left eye was cleaned and a drop of each post-operative drop was placed, followed by a bowman shield.  The patient tolerated the procedure well, and there were no  complications.      Jose Cerrato MD

## 2020-02-12 NOTE — ANESTHESIA POSTPROCEDURE EVALUATION
Patient: Gail Cuba    Procedure(s):  Cataract Removal with Implant    Diagnosis:Cataract [H26.9]  Diagnosis Additional Information: No value filed.    Anesthesia Type:  MAC    Note:  Anesthesia Post Evaluation    Patient location during evaluation: Bedside  Patient participation: Able to fully participate in evaluation  Pain management: adequate  Airway patency: patent  Cardiovascular status: acceptable  Respiratory status: acceptable  PONV: none     Anesthetic complications: None          Last vitals:  Vitals:    02/12/20 0920   BP: 138/85   Resp: 16   Temp: (!) 7.3  C (45.1  F)   SpO2: 98%         Electronically Signed By: NILS Ghosh CRNA  February 12, 2020  10:46 AM

## 2020-02-12 NOTE — ANESTHESIA CARE TRANSFER NOTE
Patient: Gail Cuba    Procedure(s):  Cataract Removal with Implant    Diagnosis: Cataract [H26.9]  Diagnosis Additional Information: No value filed.    Anesthesia Type:   MAC     Note:  Airway :Room Air  Patient transferred to:Phase II  Handoff Report: Identifed the Patient, Identified the Reponsible Provider, Reviewed the pertinent medical history, Discussed the surgical course, Reviewed Intra-OP anesthesia mangement and issues during anesthesia, Set expectations for post-procedure period and Allowed opportunity for questions and acknowledgement of understanding      Vitals: (Last set prior to Anesthesia Care Transfer)    CRNA VITALS  2/12/2020 1015 - 2/12/2020 1046      2/12/2020             Pulse:  67    SpO2:  98 %                Electronically Signed By: NILS Ghosh CRNA  February 12, 2020  10:46 AM

## 2020-03-02 ENCOUNTER — ANESTHESIA EVENT (OUTPATIENT)
Dept: SURGERY | Facility: CLINIC | Age: 80
End: 2020-03-02
Payer: COMMERCIAL

## 2020-03-02 NOTE — ANESTHESIA PREPROCEDURE EVALUATION
Anesthesia Pre-Procedure Evaluation    Patient: Gail Cuba   MRN: [id number] : 1940          Preoperative Diagnosis: Cataract [H26.9]    Procedure(s):  Cataract Removal with Implant    Past Medical History:   Diagnosis Date     Anxiety      Breast cancer (H) 2009    Right breast cancer     Breast mass      Chest pain      Hypertension      MEDICAL HISTORY OF -     Rt breast cancer treated with radiation     Urge incontinence      Past Surgical History:   Procedure Laterality Date     BIOPSY BREAST       BREAST LUMPECTOMY, RT/LT  09    Breast Lumpectomy RT     COLONOSCOPY  2013    Procedure: COLONOSCOPY;  Colonoscopy  ;  Surgeon: Deep Purcell MD;  Location: [facility]     D & C  5/24/10    for EMB      DILATION AND CURETTAGE, OPERATIVE HYSTEROSCOPY, COMBINED  2014    Procedure: COMBINED DILATION AND CURETTAGE, OPERATIVE HYSTEROSCOPY;  Hysteroscopy with dilation and curettage, possible polypectomy- choice anes  ;  Surgeon: Lauryn Hoffman MD;  Location: [facility]      EXCISION BREAST LESION, OPEN >=1  08    with sentinal node biopsy     LUMPECTOMY BREAST      right breast     PHACOEMULSIFICATION WITH STANDARD INTRAOCULAR LENS IMPLANT Left 2020    Procedure: Cataract Removal with Implant;  Surgeon: Jose Cerrato MD;  Location: [facility]     SURGICAL HISTORY OF -       Neck surgery.     SURGICAL HISTORY OF -   09    L4-5 back surgery       Anesthesia Evaluation     . Pt has had prior anesthetic. Type: General and MAC           ROS/MED HX    ENT/Pulmonary:       Neurologic:       Cardiovascular:     (+) Dyslipidemia, hypertension----. : . . . :. . Previous cardiac testing date:results:date: results:ECG reviewed date: results:Sinus Bradycardia   Voltage criteria for LVH (R(I)+S(III) exceeds 2.50 mV) -Voltage criteria w/o ST/T abnormality may be normal.     BORDERLINE date: results:          METS/Exercise Tolerance:     Hematologic:  - neg  "hematologic  ROS       Musculoskeletal:  - neg musculoskeletal ROS (+)  other musculoskeletal-       GI/Hepatic:  - neg GI/hepatic ROS       Renal/Genitourinary:  - ROS Renal section negative       Endo:  - neg endo ROS       Psychiatric:     (+) psychiatric history anxiety and depression      Infectious Disease:  - neg infectious disease ROS       Malignancy:   (+) Malignancy History of Breast  Breast CA status post Surgery.         Other:                          Physical Exam  Normal systems: cardiovascular, pulmonary and dental    Airway   Mallampati: II  TM distance: >3 FB  Neck ROM: full    Dental     Cardiovascular       Pulmonary             Lab Results   Component Value Date    WBC 5.7 01/07/2014    HGB 14.1 01/14/2014    HCT 41.9 01/07/2014     01/07/2014     07/19/2019    POTASSIUM 4.2 07/19/2019    CHLORIDE 104 07/19/2019    CO2 33 (H) 07/19/2019    BUN 14 07/19/2019    CR 0.66 07/19/2019    GLC 90 07/19/2019    CHRISTINE 9.1 07/19/2019    PHOS 3.6 02/28/2013    ALBUMIN 3.4 06/05/2017    PROTTOTAL 6.6 (L) 06/05/2017    ALT 17 06/05/2017    AST 18 06/05/2017    ALKPHOS 71 06/05/2017    BILITOTAL 0.5 06/05/2017    TSH 0.95 08/30/2012    T4 0.85 08/30/2012       Preop Vitals  BP Readings from Last 3 Encounters:   02/12/20 116/78   10/10/19 128/76   07/11/19 112/82    Pulse Readings from Last 3 Encounters:   10/10/19 64   07/11/19 61   08/31/18 64      Resp Readings from Last 3 Encounters:   02/12/20 16   10/10/19 14   07/11/19 16    SpO2 Readings from Last 3 Encounters:   02/12/20 99%   10/10/19 98%   07/11/19 99%      Temp Readings from Last 1 Encounters:   02/12/20 (!) 7.3  C (45.1  F) (Oral)    Ht Readings from Last 1 Encounters:   02/12/20 1.6 m (5' 3\")      Wt Readings from Last 1 Encounters:   02/12/20 54.4 kg (120 lb)    Estimated body mass index is 21.26 kg/m  as calculated from the following:    Height as of 2/12/20: 1.6 m (5' 3\").    Weight as of 2/12/20: 54.4 kg (120 lb).       Anesthesia " Plan      History & Physical Review  History and physical reviewed and following examination; no interval change.    ASA Status:  3 .    NPO Status:  > 6 hours    Plan for General and MAC with Intravenous and Propofol induction. Reason for MAC:  Other - see comments         Postoperative Care  Postoperative pain management:  IV analgesics and Oral pain medications.      Consents  Anesthetic plan, risks, benefits and alternatives discussed with:  Patient..                 NILS De Souza CRNA

## 2020-03-03 NOTE — H&P
[facility]  [facility]  [address]  [facility] [address]  [phone]  Dept: [phone]    OPHTHALMOLOGY PRE-OPERATIVE  HISTORY AND PHYSICAL    DATE OF H/P:  2020    DATE OF SURGERY:  2020  PROCEDURE:  Procedure(s):  Cataract Removal with Implant, Right Eye  LENS IMPLANT:  [id number] +19.0  REFRACTIVE GOAL:  PL Sph  SURGEON:  Jose Cerrato MD    ANESTHESIA:  TOPICAL / MAC    OR CASE REQUIREMENTS:    DEMOGRAPHICS:  Demographic Information on Gail Cuba:    Gail Cuba  Gender: female  : 1940  [address]  [place] [address] [phone] (home)     Medical Record: [id number]  Social Security Number: [id number]  Pharmacy: [facility] [address] [place] [address] [address]   Primary Care Provider: Maryellen Barillas    Parent's names are: Data Unavailable (mother) and Data Unavailable (father).    Insurance: Payor: [employer] / Plan: ARE MEDICARE NON [employer] PARTNERS / Product Type: HMO /     OCULAR HISTORY:  Cataracts, s/p IOL left eye.    HISTORIES:  Past Medical History:   Diagnosis Date     Anxiety      Breast cancer (H) 2009    Right breast cancer     Breast mass      Chest pain      Hypertension      MEDICAL HISTORY OF -     Rt breast cancer treated with radiation     Urge incontinence        Past Surgical History:   Procedure Laterality Date     BIOPSY BREAST       BREAST LUMPECTOMY, RT/LT  09    Breast Lumpectomy RT     COLONOSCOPY  2013    Procedure: COLONOSCOPY;  Colonoscopy  ;  Surgeon: Deep Purcell MD;  Location: [facility]     D & C  5/24/10    for EMB      DILATION AND CURETTAGE, OPERATIVE HYSTEROSCOPY, COMBINED  2014    Procedure: COMBINED DILATION AND CURETTAGE, OPERATIVE HYSTEROSCOPY;  Hysteroscopy with dilation and curettage, possible polypectomy- choice anes  ;  Surgeon: Lauryn Hoffman MD;  Location: [facility]      EXCISION BREAST LESION, OPEN >=1  08    with sentinal node biopsy      LUMPECTOMY BREAST  2009    right breast     PHACOEMULSIFICATION WITH STANDARD INTRAOCULAR LENS IMPLANT Left 2/12/2020    Procedure: Cataract Removal with Implant;  Surgeon: Jose Cerrato MD;  Location: [facility]     SURGICAL HISTORY OF -       Neck surgery.     SURGICAL HISTORY OF -   11/17/09    L4-5 back surgery       Family History   Problem Relation Age of Onset     Alcohol/Drug Mother         alcohalic     Hypertension Mother      Cerebrovascular Disease Mother      Thyroid Disease Sister      Musculoskeletal Disorder Son      Hypertension Son      Diabetes Father      Prostate Cancer Father      Cancer - colorectal Maternal Grandfather      Asthma No family hx of      C.A.D. No family hx of      Breast Cancer No family hx of      Anesthesia Reaction No family hx of      Blood Disease No family hx of      Ovarian Cancer No family hx of        Social History     Tobacco Use     Smoking status: Never Smoker     Smokeless tobacco: Never Used   Substance Use Topics     Alcohol use: Yes     Comment: occasional       MEDICATIONS:  No current facility-administered medications for this encounter.      Current Outpatient Medications   Medication Sig     ASPIRIN 81 MG OR TABS 1 TABLET DAILY     CALCIUM + D OR 1 tab daily     citalopram (CELEXA) 20 MG tablet Take 1.5 tablets (30 mg) by mouth daily     cod liver oil CAPS capsule Take 1 capsule by mouth daily     lisinopril (PRINIVIL/ZESTRIL) 5 MG tablet Take 1 tablet (5 mg) by mouth daily     Multiple Vitamins-Minerals (HAIR/SKIN/NAILS) TABS Take 2 tablets by mouth daily     Multiple Vitamins-Minerals (MULTIVITAMIN ADULTS PO) Take by mouth daily     Naproxen Sodium (ALEVE PO) Take 220 mg by mouth nightly as needed for moderate pain     VITAMIN B-12 OR 1 tab daily       ALLERGIES:     Allergies   Allergen Reactions     Latex      Blistery rash from gloves     Nkda [No Known Drug Allergies]        PERTINENT SYSTEMS REVIEW:    1. No - Do you have a history of heart  attack, stroke, stent, bypass or surgery on an artery in the head, neck, heart or legs?  2. No - Do you ever have any pain or discomfort in your chest?  3. No - Do you have a history of  Heart Failure?  4. No - Are you troubled by shortness of breath when walking: On the level, up a slight hill or at night?  5. No - Do you currently have a cold, bronchitis or other respiratory infection?  6. No - Do you have a cough, shortness of breath or wheezing?  7. No - Do you sometimes get pains in the calves of your legs when you walk?  8. No - Do you or anyone in your family have previous history of blood clots?  9. No - Do you or does anyone in your family have a serious bleeding problem such as prolonged bleeding following surgeries or cuts?  10. No - Have you ever had problems with anemia or been told to take iron pills?  11. No - Have you had any abnormal blood loss such as black, tarry or bloody stools, or abnormal vaginal bleeding?  12. No - Have you ever had a blood transfusion?  13. No - Have you or any of your relatives ever had problems with anesthesia?  14. No - Do you have sleep apnea, excessive snoring or daytime drowsiness?  15. No - Do you have any prosthetic heart valves?  16. No - Do you have prosthetic joints?    EXAMINATION:  Vitals were reviewed  Vison:  Va, right - 20/30;   BAT, right - 20/70;  HEENT:  Cataract, otherwise unremarkable.  LUNGS:  Clear  CV:  Regular rate and rhythm without murmur  ABD:  Soft and nontender  NEURO:  Alert and nonfocal    IMPRESSION:  Patient cleared for ophthalmic surgery.  Low risk with monitored, light sedation.  I have assessed the patient's DVT risk, and no additional orders necessary.    PLAN:  Procedure(s):  Cataract Removal with Implant, Right Eye      Jose Cerrato MD

## 2020-03-04 ENCOUNTER — HOSPITAL ENCOUNTER (OUTPATIENT)
Facility: CLINIC | Age: 80
Discharge: HOME | End: 2020-03-04
Attending: OPHTHALMOLOGY | Admitting: OPHTHALMOLOGY
Payer: COMMERCIAL

## 2020-03-04 ENCOUNTER — ANESTHESIA (OUTPATIENT)
Dept: SURGERY | Facility: CLINIC | Age: 80
End: 2020-03-04
Payer: COMMERCIAL

## 2020-03-04 VITALS
SYSTOLIC BLOOD PRESSURE: 131 MMHG | TEMPERATURE: 97.7 F | HEIGHT: 64 IN | BODY MASS INDEX: 20.32 KG/M2 | OXYGEN SATURATION: 98 % | WEIGHT: 119 LBS | RESPIRATION RATE: 16 BRPM | DIASTOLIC BLOOD PRESSURE: 72 MMHG

## 2020-03-04 PROCEDURE — 25800030 ZZH RX IP 258 OP 636: Performed by: NURSE ANESTHETIST, CERTIFIED REGISTERED

## 2020-03-04 PROCEDURE — 37000012 ZZH ANESTHESIA CATARACT PACKAGE: Performed by: OPHTHALMOLOGY

## 2020-03-04 PROCEDURE — 71000022 ZZH RECOVERY CATRACT PACKAGE: Performed by: OPHTHALMOLOGY

## 2020-03-04 PROCEDURE — V2632 POST CHMBR INTRAOCULAR LENS: HCPCS | Performed by: OPHTHALMOLOGY

## 2020-03-04 PROCEDURE — 25000128 H RX IP 250 OP 636: Performed by: OPHTHALMOLOGY

## 2020-03-04 PROCEDURE — 36000025 ZZH CATARACT SURGICAL PACKAGE: Performed by: OPHTHALMOLOGY

## 2020-03-04 PROCEDURE — 25000125 ZZHC RX 250: Performed by: OPHTHALMOLOGY

## 2020-03-04 PROCEDURE — 25000125 ZZHC RX 250: Performed by: NURSE ANESTHETIST, CERTIFIED REGISTERED

## 2020-03-04 PROCEDURE — 25000128 H RX IP 250 OP 636: Performed by: NURSE ANESTHETIST, CERTIFIED REGISTERED

## 2020-03-04 DEVICE — EYE IMP IOL AMO PCL TECNIS ZCB00 19.0: Type: IMPLANTABLE DEVICE | Site: EYE | Status: FUNCTIONAL

## 2020-03-04 RX ORDER — SODIUM CHLORIDE, SODIUM LACTATE, POTASSIUM CHLORIDE, CALCIUM CHLORIDE 600; 310; 30; 20 MG/100ML; MG/100ML; MG/100ML; MG/100ML
INJECTION, SOLUTION INTRAVENOUS CONTINUOUS
Status: DISCONTINUED | OUTPATIENT
Start: 2020-03-04 | End: 2020-03-04 | Stop reason: HOSPADM

## 2020-03-04 RX ORDER — CYCLOPENTOLATE HYDROCHLORIDE 10 MG/ML
1 SOLUTION/ DROPS OPHTHALMIC
Status: COMPLETED | OUTPATIENT
Start: 2020-03-04 | End: 2020-03-04

## 2020-03-04 RX ORDER — PHENYLEPHRINE HYDROCHLORIDE 25 MG/ML
1 SOLUTION/ DROPS OPHTHALMIC
Status: COMPLETED | OUTPATIENT
Start: 2020-03-04 | End: 2020-03-04

## 2020-03-04 RX ORDER — TROPICAMIDE 10 MG/ML
1 SOLUTION/ DROPS OPHTHALMIC
Status: COMPLETED | OUTPATIENT
Start: 2020-03-04 | End: 2020-03-04

## 2020-03-04 RX ORDER — BALANCED SALT SOLUTION 6.4; .75; .48; .3; 3.9; 1.7 MG/ML; MG/ML; MG/ML; MG/ML; MG/ML; MG/ML
SOLUTION OPHTHALMIC PRN
Status: DISCONTINUED | OUTPATIENT
Start: 2020-03-04 | End: 2020-03-04 | Stop reason: HOSPADM

## 2020-03-04 RX ORDER — PROPOFOL 10 MG/ML
INJECTION, EMULSION INTRAVENOUS PRN
Status: DISCONTINUED | OUTPATIENT
Start: 2020-03-04 | End: 2020-03-04

## 2020-03-04 RX ORDER — LIDOCAINE HYDROCHLORIDE 20 MG/ML
JELLY TOPICAL PRN
Status: DISCONTINUED | OUTPATIENT
Start: 2020-03-04 | End: 2020-03-04 | Stop reason: HOSPADM

## 2020-03-04 RX ORDER — LIDOCAINE 40 MG/G
CREAM TOPICAL
Status: DISCONTINUED | OUTPATIENT
Start: 2020-03-04 | End: 2020-03-04 | Stop reason: HOSPADM

## 2020-03-04 RX ORDER — PROPARACAINE HYDROCHLORIDE 5 MG/ML
SOLUTION/ DROPS OPHTHALMIC PRN
Status: DISCONTINUED | OUTPATIENT
Start: 2020-03-04 | End: 2020-03-04 | Stop reason: HOSPADM

## 2020-03-04 RX ADMIN — CYCLOPENTOLATE HYDROCHLORIDE 1 DROP: 10 SOLUTION/ DROPS OPHTHALMIC at 09:28

## 2020-03-04 RX ADMIN — CYCLOPENTOLATE HYDROCHLORIDE 1 DROP: 10 SOLUTION/ DROPS OPHTHALMIC at 09:22

## 2020-03-04 RX ADMIN — PROPOFOL 30 MG: 10 INJECTION, EMULSION INTRAVENOUS at 10:20

## 2020-03-04 RX ADMIN — TROPICAMIDE 1 DROP: 10 SOLUTION/ DROPS OPHTHALMIC at 09:21

## 2020-03-04 RX ADMIN — PHENYLEPHRINE HYDROCHLORIDE 1 DROP: 25 SOLUTION/ DROPS OPHTHALMIC at 09:18

## 2020-03-04 RX ADMIN — LIDOCAINE HYDROCHLORIDE 0.2 ML: 10 INJECTION, SOLUTION EPIDURAL; INFILTRATION; INTRACAUDAL; PERINEURAL at 09:27

## 2020-03-04 RX ADMIN — PHENYLEPHRINE HYDROCHLORIDE 1 DROP: 25 SOLUTION/ DROPS OPHTHALMIC at 09:22

## 2020-03-04 RX ADMIN — CYCLOPENTOLATE HYDROCHLORIDE 1 DROP: 10 SOLUTION/ DROPS OPHTHALMIC at 09:17

## 2020-03-04 RX ADMIN — PHENYLEPHRINE HYDROCHLORIDE 1 DROP: 25 SOLUTION/ DROPS OPHTHALMIC at 09:28

## 2020-03-04 RX ADMIN — TROPICAMIDE 1 DROP: 10 SOLUTION/ DROPS OPHTHALMIC at 09:28

## 2020-03-04 RX ADMIN — SODIUM CHLORIDE, POTASSIUM CHLORIDE, SODIUM LACTATE AND CALCIUM CHLORIDE: 600; 310; 30; 20 INJECTION, SOLUTION INTRAVENOUS at 09:27

## 2020-03-04 RX ADMIN — TROPICAMIDE 1 DROP: 10 SOLUTION/ DROPS OPHTHALMIC at 09:17

## 2020-03-04 ASSESSMENT — MIFFLIN-ST. JEOR: SCORE: 986.84

## 2020-03-04 NOTE — ANESTHESIA CARE TRANSFER NOTE
Patient: Gail Cuba    Procedure(s):  Cataract Removal with Implant    Diagnosis: Cataract [H26.9]  Diagnosis Additional Information: No value filed.    Anesthesia Type:   General, MAC     Note:  Airway :Room Air  Patient transferred to:Phase II  Handoff Report: Identifed the Patient, Identified the Reponsible Provider, Reviewed the pertinent medical history, Discussed the surgical course, Reviewed Intra-OP anesthesia mangement and issues during anesthesia, Set expectations for post-procedure period and Allowed opportunity for questions and acknowledgement of understanding      Vitals: (Last set prior to Anesthesia Care Transfer)    CRNA VITALS  3/4/2020 1003 - 3/4/2020 1033      3/4/2020             Pulse:  55    SpO2:  97 %                Electronically Signed By: NILS Ghosh CRNA  March 4, 2020  10:33 AM

## 2020-03-04 NOTE — ANESTHESIA POSTPROCEDURE EVALUATION
Patient: Gail Cuba    Procedure(s):  Cataract Removal with Implant    Diagnosis:Cataract [H26.9]  Diagnosis Additional Information: No value filed.    Anesthesia Type:  General, MAC    Note:  Anesthesia Post Evaluation    Patient location during evaluation: Bedside  Patient participation: Able to fully participate in evaluation  Level of consciousness: awake  Pain management: adequate  Airway patency: patent  Cardiovascular status: acceptable  Respiratory status: acceptable  Hydration status: stable  PONV: none     Anesthetic complications: None          Last vitals:  Vitals:    03/04/20 0909   BP: (!) 140/90   Resp: 16   Temp: 36.5  C (97.7  F)   SpO2: 100%         Electronically Signed By: NILS Ghosh CRNA  March 4, 2020  10:34 AM

## 2020-03-04 NOTE — OP NOTE
OPHTHALMOLOGY OPERATIVE NOTE    PATIENT: Gail Cuba  DATE OF SURGERY: 3/4/2020  PREOPERATIVE DIAGNOSIS:  Senile Nuclear Cataract, Right eye  POSTOPERATIVE DIAGNOSIS:  Senile Nuclear Cataract, Right eye  OPERATIVE PROCEDURE:  Phacoemulsification with placement of intraocular lens  SURGEON:  Jose Cerrato MD  ANESTHESIA:  Topical / MAC  EBL:  None  SPECIMENS:  None  COMPLICATIONS:  None    PROCEDURE:  The patient was brought to the operating room at [facility].  The right eye was prepped and draped in the usual fashion for cataract surgery.  A wire lid speculum was inserted.  A super sharp blade was used to make a paracentesis at the 11 O'clock position.  The super sharp blade was used to make a partial thickness temporal groove, which was 3 mm in length.  0.8 mL of non-preserved epi-Shugarcaine was injected into the anterior chamber.  Viscoelastic was used to inflate the anterior chamber through a cannula.  A 2.5 mm microkeratome was used to make a temporal clear corneal incision in a two-plane fashion.  A cystotome needle and forceps were used to make a capsulorrhexis.  Hydrodissection and hydrodelineation were performed with Balance Salt Solution.  The lens was then phacoemulsified and removed without complications.  The cortical material was removed with bimanual irrigation and aspiration.  The capsular bag was filled with viscoelastic.  A posterior chamber intraocular lens, preselected and recorded, was folded and inserted into the capsular bag.  The viscoelastic was removed with the irrigation and aspiration tip.  Balanced Salt Solution with Vigamox, 150mg/0.1mL, was used to refill the anterior chamber.  The wounds were checked for water tightness and required no suture.  The wire lid speculum was removed.  The patient's right eye was cleaned and a drop of each post-operative drop was placed, followed by a bowman shield.  The patient tolerated the procedure well, and there  were no complications.      Jose Cerrato MD

## 2020-11-13 DIAGNOSIS — I10 ESSENTIAL HYPERTENSION, BENIGN: ICD-10-CM

## 2020-11-13 DIAGNOSIS — F33.41 RECURRENT MAJOR DEPRESSIVE DISORDER, IN PARTIAL REMISSION (H): ICD-10-CM

## 2020-11-13 NOTE — TELEPHONE ENCOUNTER
"Requested Prescriptions   Pending Prescriptions Disp Refills     lisinopril (ZESTRIL) 5 MG tablet [Pharmacy Med Name: Lisinopril 5 MG Oral Tablet] 90 tablet 0     Sig: Take 1 tablet by mouth once daily       ACE Inhibitors (Including Combos) Protocol Failed - 11/13/2020 10:28 AM        Failed - Recent (12 mo) or future (30 days) visit within the authorizing provider's specialty     Patient has had an office visit with the authorizing provider or a provider within the authorizing providers department within the previous 12 mos or has a future within next 30 days. See \"Patient Info\" tab in inbasket, or \"Choose Columns\" in Meds & Orders section of the refill encounter.              Failed - Normal serum creatinine on file in past 12 months     Recent Labs   Lab Test 07/19/19  0757 07/07/15  1241 07/07/15  1241   CR 0.66   < >  --    CREAT  --   --  0.7    < > = values in this interval not displayed.       Ok to refill medication if creatinine is low          Failed - Normal serum potassium on file in past 12 months     Recent Labs   Lab Test 07/19/19  0757   POTASSIUM 4.2             Passed - Blood pressure under 140/90 in past 12 months     BP Readings from Last 3 Encounters:   03/04/20 131/72   02/12/20 116/78   10/10/19 128/76                 Passed - Medication is active on med list        Passed - Patient is age 18 or older        Passed - No active pregnancy on record        Passed - No positive pregnancy test within past 12 months           citalopram (CELEXA) 20 MG tablet [Pharmacy Med Name: Citalopram Hydrobromide 20 MG Oral Tablet] 135 tablet 0     Sig: TAKE 1 & 1/2 (ONE & ONE-HALF) TABLETS BY MOUTH ONCE DAILY       SSRIs Protocol Failed - 11/13/2020 10:28 AM        Failed - PHQ-9 score less than 5 in past 6 months     Please review last PHQ-9 score.           Failed - Recent (6 mo) or future (30 days) visit within the authorizing provider's specialty     Patient had office visit in the last 6 months or has a " "visit in the next 30 days with authorizing provider or within the authorizing provider's specialty.  See \"Patient Info\" tab in inbasket, or \"Choose Columns\" in Meds & Orders section of the refill encounter.            Passed - Medication is active on med list        Passed - Patient is age 18 or older        Passed - No active pregnancy on record        Passed - No positive pregnancy test in last 12 months             "

## 2020-11-13 NOTE — LETTER
[facility]  [address] [facility] [address]  Phone: [phone]       November 17, 2020         Gail Cuba  [address]  [place] [address]            Dear Gail:    We are concerned about your health care.  We recently provided you with medication refills.  Many medications require routine follow-up with your doctor.    Your prescription(s) have been refilled for 30 days so you may have time for the above noted follow-up. Please call to schedule soon so we can assure you have an appointment before your next refills are needed.    Thank you,      Maryellen Barillas NP / [facility]sm

## 2020-11-16 RX ORDER — LISINOPRIL 5 MG/1
TABLET ORAL
Qty: 30 TABLET | Refills: 0 | Status: SHIPPED | OUTPATIENT
Start: 2020-11-16 | End: 2020-12-22

## 2020-11-16 RX ORDER — CITALOPRAM HYDROBROMIDE 20 MG/1
TABLET ORAL
Qty: 45 TABLET | Refills: 0 | Status: SHIPPED | OUTPATIENT
Start: 2020-11-16 | End: 2020-12-22

## 2020-11-20 ENCOUNTER — TELEPHONE (OUTPATIENT)
Dept: FAMILY MEDICINE | Facility: CLINIC | Age: 80
End: 2020-11-20

## 2020-11-20 NOTE — TELEPHONE ENCOUNTER
11/20/20    Call Regarding Other Annual Wellness Visit    Attempt 2    Message on voicemail    Placed a 2nd call to patient to schedule an Annual Wellness Visit.   Lvm for a return call.      Outreach               SEBAS QUEEN

## 2020-12-03 NOTE — TELEPHONE ENCOUNTER
12/3/2020    Call Regarding ReattributionPhysical       Attempt 3    Message on voicemail    Comments: TYE      Outreach   INES

## 2020-12-17 ENCOUNTER — TELEPHONE (OUTPATIENT)
Dept: FAMILY MEDICINE | Facility: CLINIC | Age: 80
End: 2020-12-17

## 2020-12-17 ENCOUNTER — VIRTUAL VISIT (OUTPATIENT)
Dept: FAMILY MEDICINE | Facility: CLINIC | Age: 80
End: 2020-12-17
Payer: COMMERCIAL

## 2020-12-17 DIAGNOSIS — I10 ESSENTIAL HYPERTENSION, BENIGN: ICD-10-CM

## 2020-12-17 DIAGNOSIS — Z53.9 NO SHOW: Primary | ICD-10-CM

## 2020-12-17 DIAGNOSIS — F33.41 RECURRENT MAJOR DEPRESSIVE DISORDER, IN PARTIAL REMISSION (H): ICD-10-CM

## 2020-12-17 NOTE — PROGRESS NOTES
"Gail Cuba is a 80 year old female who is being evaluated via a billable telephone visit.      The patient has been notified of following:     \"This telephone visit will be conducted via a call between you and your physician/provider. We have found that certain health care needs can be provided without the need for a physical exam.  This service lets us provide the care you need with a short phone conversation.  If a prescription is necessary we can send it directly to your pharmacy.  If lab work is needed we can place an order for that and you can then stop by our lab to have the test done at a later time.    Telephone visits are billed at different rates depending on your insurance coverage. During this emergency period, for some insurers they may be billed the same as an in-person visit.  Please reach out to your insurance provider with any questions.    If during the course of the call the physician/provider feels a telephone visit is not appropriate, you will not be charged for this service.\"    Patient has given verbal consent for Telephone visit?  {YES-NO  Default Yes:4444::\"Yes\"}    What phone number would you like to be contacted at? ***    How would you like to obtain your AVS? {AVS Preference:637088}    Subjective     Gail Cuba is a 80 year old female who presents via phone visit today for the following health issues:    HPI     Hypertension Follow-up      Do you check your blood pressure regularly outside of the clinic? { :809455}     Are you following a low salt diet? { :472589}    Are your blood pressures ever more than 140 on the top number (systolic) OR more   than 90 on the bottom number (diastolic), for example 140/90? { :809417}    Depression Followup    How are you doing with your depression since your last visit? { :774237::\"No change\"}    Are you having other symptoms that might be associated with depression? { :216570}    Have you had a significant life event?  { :871258}     Are " "you feeling anxious or having panic attacks?   { :609719}    Do you have any concerns with your use of alcohol or other drugs? { :920803}    Social History     Tobacco Use     Smoking status: Never Smoker     Smokeless tobacco: Never Used   Substance Use Topics     Alcohol use: Yes     Comment: occasional     Drug use: No     PHQ 8/31/2018 7/11/2019 10/10/2019   PHQ-9 Total Score 7 7 3   Q9: Thoughts of better off dead/self-harm past 2 weeks Not at all Not at all Not at all     MARTIN-7 SCORE 2/28/2017 8/31/2018 10/10/2019   Total Score - - -   Total Score 4 9 4     {Last PHQ9 or GAD7 Responses (Optional):297246}  {PROVIDER ONLY Complete follow-up questions for patients who report suicide ideation  (Optional):927197}    Suicide Assessment Five-step Evaluation and Treatment (SAFE-T)      How many servings of fruits and vegetables do you eat daily?  { :442241}    On average, how many sweetened beverages do you drink each day (Examples: soda, juice, sweet tea, etc.  Do NOT count diet or artificially sweetened beverages)?   { 1-11:237446}    How many days per week do you exercise enough to make your heart beat faster? { :060984}    How many minutes a day do you exercise enough to make your heart beat faster? { :935085}    How many days per week do you miss taking your medication? {0-7 :062801}         {additonal problems for provider to add (Optional):547015}    Review of Systems   {ROS COMP (Optional):843121}       Objective          Vitals:  No vitals were obtained today due to virtual visit.    {GENERAL APPEARANCE:50::\"healthy\",\"alert\",\"no distress\"}  PSYCH: Alert and oriented times 3; coherent speech, normal   rate and volume, able to articulate logical thoughts, able   to abstract reason, no tangential thoughts, no hallucinations   or delusions  Her affect is { :0195054::\"normal\"}  RESP: No cough, no audible wheezing, able to talk in full sentences  Remainder of exam unable to be completed due to telephone " visits    {Diagnostic Test Results (Optional):645204}        Assessment/Plan:    {PROVIDER CHARTING PREFERENCE SOAPO:689168}    Phone call duration:  *** minutes

## 2020-12-17 NOTE — TELEPHONE ENCOUNTER
Reason for call:  Other   Patient called regarding (reason for call): call back    Additional comments: per patient , she is returning a call from the provider.     Phone number to reach patient:  Home number on file [phone] (home)    Best Time:  Anytime     Can we leave a detailed message on this number?  NO    Travel screening: Not Applicable

## 2020-12-17 NOTE — TELEPHONE ENCOUNTER
"Requested Prescriptions   Pending Prescriptions Disp Refills     citalopram (CELEXA) 20 MG tablet [Pharmacy Med Name: Citalopram Hydrobromide 20 MG Oral Tablet] 45 tablet 0     Sig: TAKE 1 & 1/2 (ONE & ONE-HALF) TABLETS BY MOUTH ONCE DAILY       SSRIs Protocol Failed - 12/17/2020  9:26 AM        Failed - PHQ-9 score less than 5 in past 6 months     Please review last PHQ-9 score.           Passed - Medication is active on med list        Passed - Patient is age 18 or older        Passed - No active pregnancy on record        Passed - No positive pregnancy test in last 12 months        Passed - Recent (6 mo) or future (30 days) visit within the authorizing provider's specialty     Patient had office visit in the last 6 months or has a visit in the next 30 days with authorizing provider or within the authorizing provider's specialty.  See \"Patient Info\" tab in inbasket, or \"Choose Columns\" in Meds & Orders section of the refill encounter.               lisinopril (ZESTRIL) 5 MG tablet [Pharmacy Med Name: Lisinopril 5 MG Oral Tablet] 30 tablet 0     Sig: Take 1 tablet by mouth once daily       ACE Inhibitors (Including Combos) Protocol Failed - 12/17/2020  9:26 AM        Failed - Normal serum creatinine on file in past 12 months     Recent Labs   Lab Test 07/19/19  0757 07/07/15  1241 07/07/15  1241   CR 0.66   < >  --    CREAT  --   --  0.7    < > = values in this interval not displayed.       Ok to refill medication if creatinine is low          Failed - Normal serum potassium on file in past 12 months     Recent Labs   Lab Test 07/19/19  0757   POTASSIUM 4.2             Passed - Blood pressure under 140/90 in past 12 months     BP Readings from Last 3 Encounters:   03/04/20 131/72   02/12/20 116/78   10/10/19 128/76                 Passed - Recent (12 mo) or future (30 days) visit within the authorizing provider's specialty     Patient has had an office visit with the authorizing provider or a provider within the " "authorizing providers department within the previous 12 mos or has a future within next 30 days. See \"Patient Info\" tab in inbasket, or \"Choose Columns\" in Meds & Orders section of the refill encounter.              Passed - Medication is active on med list        Passed - Patient is age 18 or older        Passed - No active pregnancy on record        Passed - No positive pregnancy test within past 12 months             "

## 2020-12-22 RX ORDER — CITALOPRAM HYDROBROMIDE 20 MG/1
TABLET ORAL
Qty: 45 TABLET | Refills: 0 | Status: SHIPPED | OUTPATIENT
Start: 2020-12-22 | End: 2021-01-06

## 2020-12-22 RX ORDER — LISINOPRIL 5 MG/1
TABLET ORAL
Qty: 30 TABLET | Refills: 0 | Status: SHIPPED | OUTPATIENT
Start: 2020-12-22 | End: 2021-01-07

## 2020-12-22 NOTE — TELEPHONE ENCOUNTER
Routing refill request to provider for review/approval because:  Patient needs to be seen because it has been more than 1 year since last office visit.  No showed last visit.    Vanessa Avery RN

## 2020-12-23 NOTE — TELEPHONE ENCOUNTER
Covering for PCP (out of clinic today):  One refill sent.  Please advise patient to reschedule follow-up visit.    Thanks,  Dillon Bello MD

## 2020-12-23 NOTE — TELEPHONE ENCOUNTER
Message left for patient to check the pharmacy.  Needs office visit for further refills. Becky RAMIREZ RN

## 2021-01-06 NOTE — PROGRESS NOTES
Gail Cuba is a 80 year old female who is being evaluated via a billable telephone visit.      What phone number would you like to be contacted at? [phone]  How would you like to obtain your AVS? Mail a copy  Assessment & Plan     Recurrent major depressive disorder, in partial remission (H)  Well controlled.  - citalopram (CELEXA) 20 MG tablet; TAKE 1 & 1/2 (ONE & ONE-HALF) TABLETS BY MOUTH ONCE DAILY    Essential hypertension, benign  Has been well controlled when in clinic.  She doesn't have a home BP monitor machine.  Due for BMP - she will make a lab only appointment.    - lisinopril (ZESTRIL) 5 MG tablet; Take 1 tablet (5 mg) by mouth daily  - **Basic metabolic panel FUTURE anytime; Future                Return in about 6 months (around 7/7/2021).    The risks, benefits and treatment options of prescribed medications or other treatments have been discussed with the patient. The patient verbalized their understanding and should call or follow up if no improvement or if they develop further problems.    NILS [facility]     Gail Cuba is a 80 year old who presents to clinic today for the following health issues     HPI       Hypertension Follow-up      Do you check your blood pressure regularly outside of the clinic? No     Are you following a low salt diet? No    Are your blood pressures ever more than 140 on the top number (systolic) OR more   than 90 on the bottom number (diastolic), for example 140/90?  Doesn't check    Depression Followup    How are you doing with your depression since your last visit? Stable - feeling better since her citalopram was increased to 30 mg daily    Are you having other symptoms that might be associated with depression? No    Have you had a significant life event?  No     Are you feeling anxious or having panic attacks?   No    Do you have any concerns with your use of alcohol or other drugs?  "No    Social History     Tobacco Use     Smoking status: Never Smoker     Smokeless tobacco: Never Used   Substance Use Topics     Alcohol use: Yes     Comment: occasional     Drug use: No     PHQ 7/11/2019 10/10/2019 1/7/2021   PHQ-9 Total Score 7 3 2   Q9: Thoughts of better off dead/self-harm past 2 weeks Not at all Not at all Not at all     MARTIN-7 SCORE 2/28/2017 8/31/2018 10/10/2019   Total Score - - -   Total Score 4 9 4           Suicide Assessment Five-step Evaluation and Treatment (SAFE-T)      How many servings of fruits and vegetables do you eat daily?  0-1    On average, how many sweetened beverages do you drink each day (Examples: soda, juice, sweet tea, etc.  Do NOT count diet or artificially sweetened beverages)?   0-1;  Juice once in a while    How many days per week do you exercise enough to make your heart beat faster?     How many minutes a day do you exercise enough to make your heart beat faster?     How many days per week do you miss taking your medication? Once in a while she forgets-  Maybe once every three weeks.        Review of Systems   Constitutional, HEENT, cardiovascular, pulmonary, gi and gu systems are negative, except as otherwise noted.      Objective    Vitals - Patient Reported  Weight (Patient Reported): 49.9 kg (110 lb)  Height (Patient Reported): 161.3 cm (5' 3.5\")  BMI (Based on Pt Reported Ht/Wt): 19.18      Vitals:  No vitals were obtained today due to virtual visit.    Physical Exam   PSYCH: Alert and oriented times 3; coherent speech, normal   rate and volume, able to articulate logical thoughts, able   to abstract reason, no tangential thoughts, no hallucinations   or delusions    RESP: No cough, no audible wheezing, able to talk in full sentences  Remainder of exam unable to be completed due to telephone visits                Phone call duration: 10 minutes  "

## 2021-01-07 ENCOUNTER — VIRTUAL VISIT (OUTPATIENT)
Dept: FAMILY MEDICINE | Facility: CLINIC | Age: 81
End: 2021-01-07
Payer: COMMERCIAL

## 2021-01-07 DIAGNOSIS — F33.41 RECURRENT MAJOR DEPRESSIVE DISORDER, IN PARTIAL REMISSION (H): ICD-10-CM

## 2021-01-07 DIAGNOSIS — I10 ESSENTIAL HYPERTENSION, BENIGN: ICD-10-CM

## 2021-01-07 PROCEDURE — 99441 PR PHYSICIAN TELEPHONE EVALUATION 5-10 MIN: CPT | Mod: 95 | Performed by: NURSE PRACTITIONER

## 2021-01-07 RX ORDER — CITALOPRAM HYDROBROMIDE 20 MG/1
TABLET ORAL
Qty: 135 TABLET | Refills: 3 | Status: SHIPPED | OUTPATIENT
Start: 2021-01-07 | End: 2022-01-20

## 2021-01-07 RX ORDER — LISINOPRIL 5 MG/1
5 TABLET ORAL DAILY
Qty: 90 TABLET | Refills: 3 | Status: SHIPPED | OUTPATIENT
Start: 2021-01-07 | End: 2022-01-20

## 2021-01-07 ASSESSMENT — PATIENT HEALTH QUESTIONNAIRE - PHQ9: SUM OF ALL RESPONSES TO PHQ QUESTIONS 1-9: 2

## 2021-01-15 DIAGNOSIS — I10 ESSENTIAL HYPERTENSION, BENIGN: ICD-10-CM

## 2021-01-15 LAB
ANION GAP SERPL CALCULATED.3IONS-SCNC: 1 MMOL/L (ref 3–14)
BUN SERPL-MCNC: 12 MG/DL (ref 7–30)
CALCIUM SERPL-MCNC: 9 MG/DL (ref 8.5–10.1)
CHLORIDE SERPL-SCNC: 107 MMOL/L (ref 94–109)
CO2 SERPL-SCNC: 32 MMOL/L (ref 20–32)
CREAT SERPL-MCNC: 0.67 MG/DL (ref 0.52–1.04)
GFR SERPL CREATININE-BSD FRML MDRD: 82 ML/MIN/{1.73_M2}
GLUCOSE SERPL-MCNC: 71 MG/DL (ref 70–99)
POTASSIUM SERPL-SCNC: 4.2 MMOL/L (ref 3.4–5.3)
SODIUM SERPL-SCNC: 140 MMOL/L (ref 133–144)

## 2021-01-15 PROCEDURE — 36415 COLL VENOUS BLD VENIPUNCTURE: CPT | Performed by: NURSE PRACTITIONER

## 2021-01-15 PROCEDURE — 80048 BASIC METABOLIC PNL TOTAL CA: CPT | Performed by: NURSE PRACTITIONER

## 2021-05-27 ENCOUNTER — RECORDS - HEALTHEAST (OUTPATIENT)
Dept: ADMINISTRATIVE | Facility: CLINIC | Age: 81
End: 2021-05-27

## 2021-05-28 ENCOUNTER — RECORDS - HEALTHEAST (OUTPATIENT)
Dept: ADMINISTRATIVE | Facility: CLINIC | Age: 81
End: 2021-05-28

## 2022-01-15 ENCOUNTER — HEALTH MAINTENANCE LETTER (OUTPATIENT)
Age: 82
End: 2022-01-15

## 2022-01-20 ENCOUNTER — OFFICE VISIT (OUTPATIENT)
Dept: FAMILY MEDICINE | Facility: CLINIC | Age: 82
End: 2022-01-20
Payer: COMMERCIAL

## 2022-01-20 VITALS
DIASTOLIC BLOOD PRESSURE: 70 MMHG | BODY MASS INDEX: 18.44 KG/M2 | OXYGEN SATURATION: 98 % | SYSTOLIC BLOOD PRESSURE: 124 MMHG | TEMPERATURE: 95.8 F | HEART RATE: 60 BPM | WEIGHT: 108 LBS | HEIGHT: 64 IN

## 2022-01-20 DIAGNOSIS — I10 ESSENTIAL HYPERTENSION, BENIGN: ICD-10-CM

## 2022-01-20 DIAGNOSIS — Z00.00 ENCOUNTER FOR MEDICARE ANNUAL WELLNESS EXAM: Primary | ICD-10-CM

## 2022-01-20 DIAGNOSIS — M81.0 AGE-RELATED OSTEOPOROSIS WITHOUT CURRENT PATHOLOGICAL FRACTURE: ICD-10-CM

## 2022-01-20 DIAGNOSIS — F33.41 RECURRENT MAJOR DEPRESSIVE DISORDER, IN PARTIAL REMISSION (H): ICD-10-CM

## 2022-01-20 DIAGNOSIS — R53.83 FATIGUE, UNSPECIFIED TYPE: ICD-10-CM

## 2022-01-20 DIAGNOSIS — R41.3 MEMORY LOSS: ICD-10-CM

## 2022-01-20 LAB
ANION GAP SERPL CALCULATED.3IONS-SCNC: 2 MMOL/L (ref 3–14)
BUN SERPL-MCNC: 15 MG/DL (ref 7–30)
CALCIUM SERPL-MCNC: 9.5 MG/DL (ref 8.5–10.1)
CHLORIDE BLD-SCNC: 103 MMOL/L (ref 94–109)
CHOLEST SERPL-MCNC: 212 MG/DL
CO2 SERPL-SCNC: 31 MMOL/L (ref 20–32)
CREAT SERPL-MCNC: 0.59 MG/DL (ref 0.52–1.04)
DEPRECATED CALCIDIOL+CALCIFEROL SERPL-MC: 38 UG/L (ref 20–75)
ERYTHROCYTE [DISTWIDTH] IN BLOOD BY AUTOMATED COUNT: 12.9 % (ref 10–15)
FASTING STATUS PATIENT QL REPORTED: NO
GFR SERPL CREATININE-BSD FRML MDRD: 89 ML/MIN/1.73M2
GLUCOSE BLD-MCNC: 78 MG/DL (ref 70–99)
HCT VFR BLD AUTO: 39.9 % (ref 35–47)
HDLC SERPL-MCNC: 68 MG/DL
HGB BLD-MCNC: 12.5 G/DL (ref 11.7–15.7)
LDLC SERPL CALC-MCNC: 115 MG/DL
MCH RBC QN AUTO: 29.3 PG (ref 26.5–33)
MCHC RBC AUTO-ENTMCNC: 31.3 G/DL (ref 31.5–36.5)
MCV RBC AUTO: 94 FL (ref 78–100)
NONHDLC SERPL-MCNC: 144 MG/DL
PLATELET # BLD AUTO: 255 10E3/UL (ref 150–450)
POTASSIUM BLD-SCNC: 4.3 MMOL/L (ref 3.4–5.3)
RBC # BLD AUTO: 4.26 10E6/UL (ref 3.8–5.2)
SODIUM SERPL-SCNC: 136 MMOL/L (ref 133–144)
TRIGL SERPL-MCNC: 145 MG/DL
TSH SERPL DL<=0.005 MIU/L-ACNC: 1.41 MU/L (ref 0.4–4)
VIT B12 SERPL-MCNC: 412 PG/ML (ref 193–986)
WBC # BLD AUTO: 4.5 10E3/UL (ref 4–11)

## 2022-01-20 PROCEDURE — 99214 OFFICE O/P EST MOD 30 MIN: CPT | Mod: 25 | Performed by: NURSE PRACTITIONER

## 2022-01-20 PROCEDURE — 80061 LIPID PANEL: CPT | Performed by: NURSE PRACTITIONER

## 2022-01-20 PROCEDURE — 82306 VITAMIN D 25 HYDROXY: CPT | Performed by: NURSE PRACTITIONER

## 2022-01-20 PROCEDURE — G0438 PPPS, INITIAL VISIT: HCPCS | Performed by: NURSE PRACTITIONER

## 2022-01-20 PROCEDURE — 85027 COMPLETE CBC AUTOMATED: CPT | Performed by: NURSE PRACTITIONER

## 2022-01-20 PROCEDURE — 36415 COLL VENOUS BLD VENIPUNCTURE: CPT | Performed by: NURSE PRACTITIONER

## 2022-01-20 PROCEDURE — 84443 ASSAY THYROID STIM HORMONE: CPT | Performed by: NURSE PRACTITIONER

## 2022-01-20 PROCEDURE — 82607 VITAMIN B-12: CPT | Performed by: NURSE PRACTITIONER

## 2022-01-20 PROCEDURE — 80048 BASIC METABOLIC PNL TOTAL CA: CPT | Performed by: NURSE PRACTITIONER

## 2022-01-20 RX ORDER — VITAMIN E 268 MG
CAPSULE ORAL
COMMUNITY
End: 2023-01-25

## 2022-01-20 RX ORDER — BIOTIN 1 MG
1000 TABLET ORAL DAILY
COMMUNITY
End: 2023-01-25

## 2022-01-20 RX ORDER — CITALOPRAM HYDROBROMIDE 20 MG/1
TABLET ORAL
Qty: 135 TABLET | Refills: 3 | Status: CANCELLED | OUTPATIENT
Start: 2022-01-20

## 2022-01-20 RX ORDER — LISINOPRIL 5 MG/1
5 TABLET ORAL DAILY
Qty: 90 TABLET | Refills: 3 | Status: SHIPPED | OUTPATIENT
Start: 2022-01-20 | End: 2023-01-11

## 2022-01-20 RX ORDER — ESCITALOPRAM OXALATE 10 MG/1
10 TABLET ORAL DAILY
Qty: 90 TABLET | Refills: 3 | Status: SHIPPED | OUTPATIENT
Start: 2022-01-20 | End: 2023-01-11

## 2022-01-20 ASSESSMENT — ANXIETY QUESTIONNAIRES
5. BEING SO RESTLESS THAT IT IS HARD TO SIT STILL: NOT AT ALL
1. FEELING NERVOUS, ANXIOUS, OR ON EDGE: NEARLY EVERY DAY
2. NOT BEING ABLE TO STOP OR CONTROL WORRYING: MORE THAN HALF THE DAYS
7. FEELING AFRAID AS IF SOMETHING AWFUL MIGHT HAPPEN: NOT AT ALL
GAD7 TOTAL SCORE: 9
IF YOU CHECKED OFF ANY PROBLEMS ON THIS QUESTIONNAIRE, HOW DIFFICULT HAVE THESE PROBLEMS MADE IT FOR YOU TO DO YOUR WORK, TAKE CARE OF THINGS AT HOME, OR GET ALONG WITH OTHER PEOPLE: SOMEWHAT DIFFICULT
6. BECOMING EASILY ANNOYED OR IRRITABLE: MORE THAN HALF THE DAYS
3. WORRYING TOO MUCH ABOUT DIFFERENT THINGS: MORE THAN HALF THE DAYS

## 2022-01-20 ASSESSMENT — PATIENT HEALTH QUESTIONNAIRE - PHQ9
5. POOR APPETITE OR OVEREATING: NOT AT ALL
SUM OF ALL RESPONSES TO PHQ QUESTIONS 1-9: 10

## 2022-01-20 ASSESSMENT — ACTIVITIES OF DAILY LIVING (ADL): CURRENT_FUNCTION: NO ASSISTANCE NEEDED

## 2022-01-20 ASSESSMENT — MIFFLIN-ST. JEOR: SCORE: 926.94

## 2022-01-20 NOTE — PROGRESS NOTES
"    SUBJECTIVE:   Gail Cuba is a 82 year old female who presents for Preventive Visit.      Patient has been advised of split billing requirements and indicates understanding: Yes  Are you in the first 12 months of your Medicare coverage?  No    Healthy Habits:    In general, how would you rate your overall health?  Very good    Frequency of exercise:  None    Duration of exercise:  Other    Do you usually eat at least 4 servings of fruit and vegetables a day, include whole grains    & fiber and avoid regularly eating high fat or \"junk\" foods?  No    Taking medications regularly:  Yes    Barriers to taking medications:  None    Medication side effects:  None    Ability to successfully perform activities of daily living:  No assistance needed    Home Safety:  Throw rugs in the hallway    Hearing Impairment:  No hearing concerns    In the past 6 months, have you been bothered by leaking of urine? Yes    In general, how would you rate your overall mental or emotional health?  Good      PHQ-2 Total Score:    Additional concerns today:  Yes    Do you feel safe in your environment? Yes    Have you ever done Advance Care Planning? (For example, a Health Directive, POLST, or a discussion with a medical provider or your loved ones about your wishes): Yes, patient states has an Advance Care Planning document and will bring a copy to the clinic.       Fall risk  Fallen 2 or more times in the past year?: No  Any fall with injury in the past year?: No    Cognitive Screening   1) Repeat 3 items (Leader, Season, Table)    2) Clock draw: ABNORMAL  3) 3 item recall: Recalls NO objects   Results: 0 items recalled: PROBABLE COGNITIVE IMPAIRMENT, **INFORM PROVIDER**    Mini-CogTM Copyright LAYO [facility]. Licensed by the author for use in [facility]; reprinted with permission ([email or web address]). All rights reserved.      Do you have sleep apnea, excessive snoring or daytime drowsiness?: no    Reviewed and updated as needed " this visit by clinical staff  Tobacco  Allergies  Meds   Med Hx  Surg Hx  Fam Hx  Soc Hx       Reviewed and updated as needed this visit by Provider               Social History     Tobacco Use     Smoking status: Never Smoker     Smokeless tobacco: Never Used   Substance Use Topics     Alcohol use: Yes     Comment: Occasional     If you drink alcohol do you typically have >3 drinks per day or >7 drinks per week? No    No flowsheet data found.        PROBLEMS TO ADD ON...    Memory issue:  Patient's sister has noticed some memory issues  Patient lives alone - sister lives across the parking lot.  Sister does most of the driving, but patient does once in a while - sister worried that vision is affecting her driving as she has noticed her veering to the right while driving       Fatigued  Loss of appetite/ doesn't like to cook  Nail beds-  Has lost two nails on her right hand      Anxiety Follow-Up    How are you doing with your anxiety since your last visit? Worsened     Are you having other symptoms that might be associated with anxiety? Yes:  sleeping issues; feels anxious all the time    Have you had a significant life event? No     Are you feeling depressed? No    Do you have any concerns with your use of alcohol or other drugs? No     Doesn't feel that the Citalopram is working anymore. Sister is on Lexapro and she wants to switch to that.    Social History     Tobacco Use     Smoking status: Never Smoker     Smokeless tobacco: Never Used   Vaping Use     Vaping Use: Never used   Substance Use Topics     Alcohol use: Yes     Comment: Occasional     Drug use: No     MARTIN-7 SCORE 2/28/2017 8/31/2018 10/10/2019   Total Score - - -   Total Score 4 9 4     PHQ 7/11/2019 10/10/2019 1/7/2021   PHQ-9 Total Score 7 3 2   Q9: Thoughts of better off dead/self-harm past 2 weeks Not at all Not at all Not at all           Current providers sharing in care for this patient include:   Patient Care Team:  Maryellen Barillas,  "APRN CNP as PCP - General (Internal Medicine)  ERINN Ricardo MD as MD (Urology)  Yue Du APRN CNP as Assigned PCP    The following health maintenance items are reviewed in Epic and correct as of today:  Health Maintenance Due   Topic Date Due     ANNUAL REVIEW OF HM ORDERS  Never done     MEDICARE ANNUAL WELLNESS VISIT  Never done     ZOSTER IMMUNIZATION (2 of 3) 07/07/2016     ADVANCE CARE PLANNING  07/11/2016     LIPID  07/19/2020     PHQ-9  07/07/2021     FALL RISK ASSESSMENT  01/07/2022         Review of Systems  Constitutional, HEENT, cardiovascular, pulmonary, GI, , musculoskeletal, neuro, skin, endocrine and psych systems are negative, except as otherwise noted.    OBJECTIVE:   /70 (BP Location: Right arm)   Pulse 60   Temp (!) 95.8  F (35.4  C) (Tympanic)   Ht 1.613 m (5' 3.5\")   Wt 49 kg (108 lb)   SpO2 98%   BMI 18.83 kg/m   Estimated body mass index is 18.83 kg/m  as calculated from the following:    Height as of this encounter: 1.613 m (5' 3.5\").    Weight as of this encounter: 49 kg (108 lb).  Physical Exam  GENERAL APPEARANCE: healthy, alert and no distress  EYES: Eyes grossly normal to inspection, PERRL and conjunctivae and sclerae normal  HENT: ear canals and TM's normal, nose and mouth without ulcers or lesions, oropharynx clear and oral mucous membranes moist  NECK: no adenopathy, no asymmetry, masses, or scars and thyroid normal to palpation  RESP: lungs clear to auscultation - no rales, rhonchi or wheezes  CV: regular rate and rhythm, normal S1 S2, no S3 or S4, no murmur, click or rub, no peripheral edema and peripheral pulses strong  ABDOMEN: soft, nontender, no hepatosplenomegaly, no masses and bowel sounds normal  MS: no musculoskeletal defects are noted and gait is age appropriate without ataxia  NEURO: Normal strength and tone, sensory exam grossly normal, mentation intact and speech normal  PSYCH: mentation appears normal and affect normal/bright        ASSESSMENT " "/ PLAN:       ICD-10-CM    1. Encounter for Medicare annual wellness exam  Z00.00 Lipid panel reflex to direct LDL Fasting     Lipid panel reflex to direct LDL Fasting     2. Recurrent major depressive disorder, in partial remission (H)  F33.41 Patient states that her depression is good but feeling anxious.  Stop the citalopram and switch to Lexapro.  Follow up in one month if no improvement.  OFFICE/OUTPT VISIT,EST,LEVL III     escitalopram (LEXAPRO) 10 MG tablet     3. Essential hypertension, benign  I10 Well controlled.  lisinopril (ZESTRIL) 5 MG tablet     Basic metabolic panel  (Ca, Cl, CO2, Creat, Gluc, K, Na, BUN)     OFFICE/OUTPT VISIT,EST,LEVL III     Basic metabolic panel  (Ca, Cl, CO2, Creat, Gluc, K, Na, BUN)     4. Fatigue, unspecified type  R53.83 Etiology unclear.  Labs today:  CBC with platelets     TSH with free T4 reflex     Vitamin B12     OFFICE/OUTPT VISIT,EST,LEVL III     Vitamin B12     TSH with free T4 reflex     CBC with platelets     5. Age-related osteoporosis without current pathological fracture  M81.0 Vitamin D Deficiency     OFFICE/OUTPT VISIT,EST,LEVL III     Vitamin D Deficiency     6. Memory loss  R41.3 Checking TSH and B12 levels today  Discussed modifications that may be needed - help setting up pill boxes.  Discussed driving - no driving until eye exam and driving [facility].       Patient has been advised of split billing requirements and indicates understanding: Yes by AFR and CMA    COUNSELING:  Reviewed preventive health counseling, as reflected in patient instructions    Estimated body mass index is 18.83 kg/m  as calculated from the following:    Height as of this encounter: 1.613 m (5' 3.5\").    Weight as of this encounter: 49 kg (108 lb).        She reports that she has never smoked. She has never used smokeless tobacco.      Appropriate preventive services were discussed with this patient, including applicable screening as appropriate for cardiovascular " disease, diabetes, osteopenia/osteoporosis, and glaucoma.  As appropriate for age/gender, discussed screening for colorectal cancer, prostate cancer, breast cancer, and cervical cancer. Checklist reviewing preventive services available has been given to the patient.    Reviewed patients plan of care and provided an AVS. The Basic Care Plan (routine screening as documented in Health Maintenance) for Gail meets the Care Plan requirement. This Care Plan has been established and reviewed with the Patient.      The risks, benefits and treatment options of prescribed medications or other treatments have been discussed with the patient. The patient verbalized their understanding and should call or follow up if no improvement or if they develop further problems.    NILS Ernst [facility]    Identified Health Risks:

## 2022-01-20 NOTE — PATIENT INSTRUCTIONS
Schedule an eye exam      Schedule a driving evaluation with Pratik Brent: [phone]          Patient Education   Personalized Prevention Plan  You are due for the preventive services outlined below.  Your care team is available to assist you in scheduling these services.  If you have already completed any of these items, please share that information with your care team to update in your medical record.  Health Maintenance Due   Topic Date Due     ANNUAL REVIEW OF HM ORDERS  Never done     Zoster (Shingles) Vaccine (2 of 3) 07/07/2016     Cholesterol Lab  07/19/2020     Depression Assessment  07/07/2021     FALL RISK ASSESSMENT  01/07/2022       Exercise for a Healthier Heart  You may wonder how you can improve the health of your heart. If you re thinking about exercise, you re on the right track. You don t need to become an athlete. But you do need a certain amount of brisk exercise to help strengthen your heart. If you have been diagnosed with a heart condition, your healthcare provider may advise exercise to help stabilize your condition. To help make exercise a habit, choose safe, fun activities.      Exercise with a friend. When activity is fun, you're more likely to stick with it.   Before you start  Check with your healthcare provider before starting an exercise program. This is especially important if you have not been active for a while. It's also important if you have a long-term (chronic) health problem such as heart disease, diabetes, or obesity. Or if you are at high risk for having these problems.   Why exercise?  Exercising regularly offers many healthy rewards. It can help you do all of the following:     Improve your blood cholesterol level to help prevent further heart trouble    Lower your blood pressure to help prevent a stroke or heart attack    Control diabetes, or reduce your risk of getting this disease    Improve your heart and lung function    Reach and stay at a healthy  weight    Make your muscles stronger so you can stay active    Prevent falls and fractures by slowing the loss of bone mass (osteoporosis)    Manage stress better    Reduce your blood pressure    Improve your sense of self and your body image  Exercise tips      Ease into your routine. Set small goals. Then build on them. If you are not sure what your activity level should be, talk with your healthcare provider first before starting an exercise routine.    Exercise on most days. Aim for a total of 150 minutes (2 hours and 30 minutes) or more of moderate-intensity aerobic activity each week. Or 75 minutes (1 hour and 15 minutes) or more of vigorous-intensity aerobic activity each week. Or try for a combination of both. Moderate activity means that you breathe heavier and your heart rate increases but you can still talk. Think about doing 40 minutes of moderate exercise, 3 to 4 times a week. For best results, activity should last for about 40 minutes to lower blood pressure and cholesterol. It's OK to work up to the 40-minute period over time. Examples of moderate-intensity activity are walking 1 mile in 15 minutes. Or doing 30 to 45 minutes of yard work.    Step up your daily activity level.  Along with your exercise program, try being more active the whole day. Walk instead of drive. Or park further away so that you take more steps each day. Do more household tasks or yard work. You may not be able to meet the advised mount of physical activity. But doing some moderate- or vigorous-intensity aerobic activity can help reduce your risk for heart disease. Your healthcare provider can help you figure out what is best for you.    Choose 1 or more activities you enjoy.  Walking is one of the easiest things you can do. You can also try swimming, riding a bike, dancing, or taking an exercise class.    When to call your healthcare provider  Call your healthcare provider if you have any of these:     Chest pain or feel dizzy  or lightheaded    Burning, tightness, pressure, or heaviness in your chest, neck, shoulders, back, or arms    Abnormal shortness of breath    More joint or muscle pain    A very fast or irregular heartbeat (palpitations)  [employer] last reviewed this educational content on 7/1/2019 2000-2021 [employer]. All rights reserved. This information is not intended as a substitute for professional medical care. Always follow your healthcare professional's instructions.          Understanding USDA MyPlate  The USDA has guidelines to help you make healthy food choices. These are called MyPlate. MyPlate shows the food groups that make up healthy meals using the image of a place setting. Before you eat, think about the healthiest choices for what to put on your plate or in your cup or bowl. To learn more about building a healthy plate, visit www.choosemyplate.gov.    The food groups    Fruits. Any fruit or 100% fruit juice counts as part of the Fruit Group. Fruits may be fresh, canned, frozen, or dried, and may be whole, cut-up, or pureed. Make 1/2 of your plate fruits and vegetables.    Vegetables. Any vegetable or 100% vegetable juice counts as a member of the Vegetable Group. Vegetables may be fresh, frozen, canned, or dried. They can be served raw or cooked and may be whole, cut-up, or mashed. Make 1/2 of your plate fruits and vegetables.    Grains. All foods made from grains are part of the Grains Group. These include wheat, rice, oats, cornmeal, and barley. Grains are often used to make foods such as bread, pasta, oatmeal, cereal, tortillas, and grits. Grains should be no more than 1/4 of your plate. At least half of your grains should be whole grains.    Protein. This group includes meat, poultry, seafood, beans and peas, eggs, processed soy products (such as tofu), nuts (including nut butters), and seeds. Make protein choices no more than 1/4 of your plate. Meat and poultry choices should be lean or low  fat.    Dairy. The Dairy Group includes all fluid milk products and foods made from milk that contain calcium, such as yogurt and cheese. (Foods that have little calcium, such as cream, butter, and cream cheese, are not part of this group.) Most dairy choices should be low-fat or fat-free.    Oils. Oils aren't a food group, but they do contain essential nutrients. However it's important to watch your intake of oils. These are fats that are liquid at room temperature. They include canola, corn, olive, soybean, vegetable, and sunflower oil. Foods that are mainly oil include mayonnaise, certain salad dressings, and soft margarines. You likely already get your daily oil allowance from the foods you eat.  Things to limit  Eating healthy also means limiting these things in your diet:       Salt (sodium). Many processed foods have a lot of sodium. To keep sodium intake down, eat fresh vegetables, meats, poultry, and seafood when possible. Purchase low-sodium, reduced-sodium, or no-salt-added food products at the store. And don't add salt to your meals at home. Instead, season them with herbs and spices such as dill, oregano, cumin, and paprika. Or try adding flavor with lemon or lime zest and juice.    Saturated fat. Saturated fats are most often found in animal products such as beef, pork, and chicken. They are often solid at room temperature, such as butter. To reduce your saturated fat intake, choose leaner cuts of meat and poultry. And try healthier cooking methods such as grilling, broiling, roasting, or baking. For a simple lower-fat swap, use plain nonfat yogurt instead of mayonnaise when making potato salad or macaroni salad.    Added sugars. These are sugars added to foods. They are in foods such as ice cream, candy, soda, fruit drinks, sports drinks, energy drinks, cookies, pastries, jams, and syrups. Cut down on added sugars by sharing sweet treats with a family member or friend. You can also choose fruit for  dessert, and drink water or other unsweetened beverages.     [employer] last reviewed this educational content on 6/1/2020 2000-2021 [employer]. All rights reserved. This information is not intended as a substitute for professional medical care. Always follow your healthcare professional's instructions.          Urinary Incontinence, Female (Adult)   Urinary incontinence means loss of bladder control. This problem affects many women, especially as they get older. If you have incontinence, you may be embarrassed to ask for help. But know that this problem can be treated.   Types of Incontinence  There are different types of incontinence. Two of the main types are described here. You can have more than one type.     Stress incontinence. With this type, urine leaks when pressure (stress) is put on the bladder. This may happen when you cough, sneeze, or laugh. Stress incontinence most often occurs because the pelvic floor muscles that support the bladder and urethra are weak. This can happen after pregnancy and vaginal childbirth or a hysterectomy. It can also be due to excess body weight or hormone changes.    Urge incontinence (also called overactive bladder). With this type, a sudden urge to urinate is felt often. This may happen even though there may not be much urine in the bladder. The need to urinate often during the night is common. Urge incontinence most often occurs because of bladder spasms. This may be due to bladder irritation or infection. Damage to bladder nerves or pelvic muscles, constipation, and certain medicines can also lead to urge incontinence.  Treatment depends on the cause. Further evaluation is needed to find the type you have. This will likely include an exam and certain tests. Based on the results, you and your healthcare provider can then plan treatment. Until a diagnosis is made, the home care tips below can help ease symptoms.   Home care    Do pelvic floor muscle  exercises, if they are prescribed. The pelvic floor muscles help support the bladder and urethra. Many women find that their symptoms improve when doing special exercises that strengthen these muscles. To do the exercises, contract the muscles you would use to stop your stream of urine. But do this when you re not urinating. Hold for 10 seconds, then relax. Repeat 10 to 20 times in a row, at least 3 times a day. Your healthcare provider may give you other instructions for how to do the exercises and how often.    Keep a bladder diary. This helps track how often and how much you urinate over a set period of time. Bring this diary with you to your next visit with the provider. The information can help your provider learn more about your bladder problem.    Lose weight, if advised to by your provider. Extra weight puts pressure on the bladder. Your provider can help you create a weight-loss plan that s right for you. This may include exercising more and making certain diet changes.    Don't have foods and drinks that may irritate the bladder. These can include alcohol and caffeinated drinks.    Quit smoking. Smoking and other tobacco use can lead to a long-term (chronic) cough that strains the pelvic floor muscles. Smoking may also damage the bladder and urethra. Talk with your provider about treatments or methods you can use to quit smoking.    If drinking large amounts of fluid makes you have symptoms, you may be advised to limit your fluid intake. You may also be advised to drink most of your fluids during the day and to limit fluids at night.    If you re worried about urine leakage or accidents, you may wear absorbent pads to catch urine. Change the pads often. This helps reduce discomfort. It may also reduce the risk of skin or bladder infections.    Follow-up care  Follow up with your healthcare provider, or as directed. It may take some to find the right treatment for your problem. But healthy lifestyle changes  can be made right away. These include such things as exercising on a regular basis, eating a healthy diet, losing weight (if needed), and quitting smoking. Your treatment plan may include special therapies or medicines. Certain procedures or surgery may also be options. Talk about any questions you have with your provider.   When to seek medical advice  Call the healthcare provider right away if any of these occur:    Fever of 100.4 F (38 C) or higher, or as directed by your provider    Bladder pain or fullness    Belly swelling    Nausea or vomiting    Back pain    Weakness, dizziness, or fainting  [employer] last reviewed this educational content on 1/1/2020 2000-2021 [employer]. All rights reserved. This information is not intended as a substitute for professional medical care. Always follow your healthcare professional's instructions.

## 2022-01-21 ASSESSMENT — ANXIETY QUESTIONNAIRES: GAD7 TOTAL SCORE: 9

## 2023-01-07 ENCOUNTER — HEALTH MAINTENANCE LETTER (OUTPATIENT)
Age: 83
End: 2023-01-07

## 2023-01-10 DIAGNOSIS — F33.41 RECURRENT MAJOR DEPRESSIVE DISORDER, IN PARTIAL REMISSION (H): ICD-10-CM

## 2023-01-10 DIAGNOSIS — I10 ESSENTIAL HYPERTENSION, BENIGN: ICD-10-CM

## 2023-01-10 NOTE — TELEPHONE ENCOUNTER
"Gail needs a follow-up appointment. Thank you!    Nidhi Garcia, RN      Requested Prescriptions   Pending Prescriptions Disp Refills     lisinopril (ZESTRIL) 5 MG tablet [Pharmacy Med Name: Lisinopril 5 MG Oral Tablet] 90 tablet 0     Sig: Take 1 tablet by mouth once daily       ACE Inhibitors (Including Combos) Protocol Passed - 1/10/2023  1:59 AM        Passed - Blood pressure under 140/90 in past 12 months     BP Readings from Last 3 Encounters:   01/20/22 124/70   03/04/20 131/72   02/12/20 116/78                 Passed - Recent (12 mo) or future (30 days) visit within the authorizing provider's specialty     Patient has had an office visit with the authorizing provider or a provider within the authorizing providers department within the previous 12 mos or has a future within next 30 days. See \"Patient Info\" tab in inbasket, or \"Choose Columns\" in Meds & Orders section of the refill encounter.              Passed - Medication is active on med list        Passed - Patient is age 18 or older        Passed - No active pregnancy on record        Passed - Normal serum creatinine on file in past 12 months     Recent Labs   Lab Test 01/20/22  1010 11/19/15  1019 07/07/15  1241   CR 0.59   < >  --    CREAT  --   --  0.7    < > = values in this interval not displayed.       Ok to refill medication if creatinine is low          Passed - Normal serum potassium on file in past 12 months     Recent Labs   Lab Test 01/20/22  1010   POTASSIUM 4.3             Passed - No positive pregnancy test within past 12 months           escitalopram (LEXAPRO) 10 MG tablet [Pharmacy Med Name: Escitalopram Oxalate 10 MG Oral Tablet] 90 tablet 0     Sig: Take 1 tablet by mouth once daily       SSRIs Protocol Failed - 1/10/2023  1:59 AM        Failed - PHQ-9 score less than 5 in past 6 months     Please review last PHQ-9 score.           Failed - Recent (6 mo) or future (30 days) visit within the authorizing provider's specialty     " "Patient had office visit in the last 6 months or has a visit in the next 30 days with authorizing provider or within the authorizing provider's specialty.  See \"Patient Info\" tab in inbasket, or \"Choose Columns\" in Meds & Orders section of the refill encounter.            Passed - Medication is active on med list        Passed - Patient is age 18 or older        Passed - No active pregnancy on record        Passed - No positive pregnancy test in last 12 months             "

## 2023-01-11 RX ORDER — LISINOPRIL 5 MG/1
TABLET ORAL
Qty: 90 TABLET | Refills: 0 | Status: SHIPPED | OUTPATIENT
Start: 2023-01-11 | End: 2023-01-25

## 2023-01-11 RX ORDER — ESCITALOPRAM OXALATE 10 MG/1
TABLET ORAL
Qty: 90 TABLET | Refills: 0 | Status: SHIPPED | OUTPATIENT
Start: 2023-01-11 | End: 2023-01-25

## 2023-01-25 ENCOUNTER — OFFICE VISIT (OUTPATIENT)
Dept: FAMILY MEDICINE | Facility: CLINIC | Age: 83
End: 2023-01-25
Payer: COMMERCIAL

## 2023-01-25 VITALS
BODY MASS INDEX: 19.84 KG/M2 | DIASTOLIC BLOOD PRESSURE: 82 MMHG | HEART RATE: 61 BPM | HEIGHT: 63 IN | WEIGHT: 112 LBS | RESPIRATION RATE: 12 BRPM | OXYGEN SATURATION: 99 % | SYSTOLIC BLOOD PRESSURE: 120 MMHG | TEMPERATURE: 97.3 F

## 2023-01-25 DIAGNOSIS — E78.5 HYPERLIPIDEMIA LDL GOAL <130: ICD-10-CM

## 2023-01-25 DIAGNOSIS — I10 ESSENTIAL HYPERTENSION, BENIGN: ICD-10-CM

## 2023-01-25 DIAGNOSIS — F33.41 RECURRENT MAJOR DEPRESSIVE DISORDER, IN PARTIAL REMISSION (H): ICD-10-CM

## 2023-01-25 LAB
ANION GAP SERPL CALCULATED.3IONS-SCNC: 7 MMOL/L (ref 7–15)
BUN SERPL-MCNC: 13.2 MG/DL (ref 8–23)
CALCIUM SERPL-MCNC: 9.5 MG/DL (ref 8.8–10.2)
CHLORIDE SERPL-SCNC: 102 MMOL/L (ref 98–107)
CHOLEST SERPL-MCNC: 211 MG/DL
CREAT SERPL-MCNC: 0.63 MG/DL (ref 0.51–0.95)
DEPRECATED HCO3 PLAS-SCNC: 32 MMOL/L (ref 22–29)
GFR SERPL CREATININE-BSD FRML MDRD: 88 ML/MIN/1.73M2
GLUCOSE SERPL-MCNC: 86 MG/DL (ref 70–99)
HDLC SERPL-MCNC: 71 MG/DL
LDLC SERPL CALC-MCNC: 103 MG/DL
NONHDLC SERPL-MCNC: 140 MG/DL
POTASSIUM SERPL-SCNC: 4.6 MMOL/L (ref 3.4–5.3)
SODIUM SERPL-SCNC: 141 MMOL/L (ref 136–145)
TRIGL SERPL-MCNC: 187 MG/DL

## 2023-01-25 PROCEDURE — 80048 BASIC METABOLIC PNL TOTAL CA: CPT | Performed by: NURSE PRACTITIONER

## 2023-01-25 PROCEDURE — 99214 OFFICE O/P EST MOD 30 MIN: CPT | Performed by: NURSE PRACTITIONER

## 2023-01-25 PROCEDURE — 80061 LIPID PANEL: CPT | Performed by: NURSE PRACTITIONER

## 2023-01-25 PROCEDURE — 36415 COLL VENOUS BLD VENIPUNCTURE: CPT | Performed by: NURSE PRACTITIONER

## 2023-01-25 RX ORDER — ESCITALOPRAM OXALATE 10 MG/1
10 TABLET ORAL DAILY
Qty: 90 TABLET | Refills: 3 | Status: SHIPPED | OUTPATIENT
Start: 2023-01-25 | End: 2024-01-12

## 2023-01-25 RX ORDER — LISINOPRIL 5 MG/1
5 TABLET ORAL DAILY
Qty: 90 TABLET | Refills: 3 | Status: SHIPPED | OUTPATIENT
Start: 2023-01-25 | End: 2023-06-26 | Stop reason: DRUGHIGH

## 2023-01-25 ASSESSMENT — PATIENT HEALTH QUESTIONNAIRE - PHQ9
SUM OF ALL RESPONSES TO PHQ QUESTIONS 1-9: 4
SUM OF ALL RESPONSES TO PHQ QUESTIONS 1-9: 4
10. IF YOU CHECKED OFF ANY PROBLEMS, HOW DIFFICULT HAVE THESE PROBLEMS MADE IT FOR YOU TO DO YOUR WORK, TAKE CARE OF THINGS AT HOME, OR GET ALONG WITH OTHER PEOPLE: NOT DIFFICULT AT ALL

## 2023-01-25 ASSESSMENT — PAIN SCALES - GENERAL: PAINLEVEL: NO PAIN (0)

## 2023-01-25 NOTE — PROGRESS NOTES
Assessment & Plan     Recurrent major depressive disorder, in partial remission (H)  Well controlled.  - escitalopram (LEXAPRO) 10 MG tablet; Take 1 tablet (10 mg) by mouth daily    Essential hypertension, benign  Well controlled.  - lisinopril (ZESTRIL) 5 MG tablet; Take 1 tablet (5 mg) by mouth daily  - Basic metabolic panel  (Ca, Cl, CO2, Creat, Gluc, K, Na, BUN); Future  - Basic metabolic panel  (Ca, Cl, CO2, Creat, Gluc, K, Na, BUN)    Hyperlipidemia LDL goal <130  Due for labs today  - Lipid panel reflex to direct LDL Non-fasting; Future  - Lipid panel reflex to direct LDL Non-fasting      The risks, benefits and treatment options of prescribed medications or other treatments have been discussed with the patient. The patient verbalized their understanding and should call or follow up if no improvement or if they develop further problems.    [facility] TRA Reynolds is a 83 year old, presenting for the following health issues:  Depression, Hypertension (Refill meds), and Health Maintenance (Patient declines all immunizations. )      History of Present Illness       Mental Health Follow-up:  Patient presents to follow-up on Depression.Patient's depression since last visit has been:  Medium  The patient is not having other symptoms associated with depression.      Any significant life events: No  Patient is not feeling anxious or having panic attacks.  Patient has no concerns about alcohol or drug use.    Hypertension: She presents for follow up of hypertension.  She does not check blood pressure  regularly outside of the clinic. Outpatient blood pressures have not been over 140/90. (doesn't check) She does not follow a low salt diet.     Reason for visit:  So i can refill my precptions    She eats 0-1 servings of fruits and vegetables daily.She consumes 0 sweetened beverage(s) daily.She exercises with enough effort to increase her heart  "rate 9 or less minutes per day.  She exercises with enough effort to increase her heart rate 3 or less days per week.   She is taking medications regularly.    Today's PHQ-9         PHQ-9 Total Score: 4    PHQ-9 Q9 Thoughts of better off dead/self-harm past 2 weeks :   Not at all    How difficult have these problems made it for you to do your work, take care of things at home, or get along with other people: Not difficult at all             Review of Systems   Constitutional, HEENT, cardiovascular, pulmonary, gi and gu systems are negative, except as otherwise noted.      Objective    /82 (BP Location: Right arm, Cuff Size: Adult Regular)   Pulse 61   Temp 97.3  F (36.3  C) (Tympanic)   Resp 12   Ht 1.594 m (5' 2.75\")   Wt 50.8 kg (112 lb)   SpO2 99%   BMI 20.00 kg/m    Body mass index is 20 kg/m .  Physical Exam   GENERAL: healthy, alert and no distress  RESP: lungs clear to auscultation - no rales, rhonchi or wheezes  CV: regular rate and rhythm, normal S1 S2, no S3 or S4, no murmur, click or rub, no peripheral edema and peripheral pulses strong  PSYCH: mentation appears normal, affect normal/bright                    "

## 2023-01-26 ENCOUNTER — TELEPHONE (OUTPATIENT)
Dept: FAMILY MEDICINE | Facility: CLINIC | Age: 83
End: 2023-01-26
Payer: COMMERCIAL

## 2023-04-22 ENCOUNTER — HEALTH MAINTENANCE LETTER (OUTPATIENT)
Age: 83
End: 2023-04-22

## 2023-04-29 ENCOUNTER — OFFICE VISIT (OUTPATIENT)
Dept: URGENT CARE | Facility: URGENT CARE | Age: 83
End: 2023-04-29
Payer: COMMERCIAL

## 2023-04-29 VITALS
SYSTOLIC BLOOD PRESSURE: 131 MMHG | TEMPERATURE: 98 F | WEIGHT: 114 LBS | OXYGEN SATURATION: 98 % | DIASTOLIC BLOOD PRESSURE: 78 MMHG | HEART RATE: 60 BPM | BODY MASS INDEX: 20.36 KG/M2

## 2023-04-29 DIAGNOSIS — H10.33 ACUTE BACTERIAL CONJUNCTIVITIS OF BOTH EYES: Primary | ICD-10-CM

## 2023-04-29 PROCEDURE — 99213 OFFICE O/P EST LOW 20 MIN: CPT | Performed by: EMERGENCY MEDICINE

## 2023-04-29 RX ORDER — POLYMYXIN B SULFATE AND TRIMETHOPRIM 1; 10000 MG/ML; [USP'U]/ML
1-2 SOLUTION OPHTHALMIC EVERY 4 HOURS
Qty: 5 ML | Refills: 0 | Status: SHIPPED | OUTPATIENT
Start: 2023-04-29 | End: 2023-05-06

## 2023-04-29 NOTE — PATIENT INSTRUCTIONS
Eyedrops as instructed  Warm compresses for 15 minutes, 3 times daily next 3 days  Recheck if any worsening redness swelling or pain  Follow-up with your eye doctor in 4 to 5 days if symptoms continue.

## 2023-04-29 NOTE — PROGRESS NOTES
CHIEF COMPLAINT: Crustiness and redness both eyes      HPI: Patient is an 83-year-old female whose had redness and crusty exudate in both eyes for 3 days.  No injury to her eyes.  She did have cataract replacements several years ago but no chronic ongoing eye illness.  No contact lens use.    ROS: See HPI otherwise normal.    Allergies   Allergen Reactions     Latex      Blistery rash from gloves     Nkda [No Known Drug Allergy]       Current Outpatient Medications   Medication Sig Dispense Refill     ASPIRIN 81 MG OR TABS 1 TABLET DAILY       Black Elderberry 50 MG/5ML SYRP        escitalopram (LEXAPRO) 10 MG tablet Take 1 tablet (10 mg) by mouth daily 90 tablet 3     lisinopril (ZESTRIL) 5 MG tablet Take 1 tablet (5 mg) by mouth daily 90 tablet 3     Multiple Vitamins-Minerals (MULTIVITAMIN ADULTS PO) Take by mouth daily       Naproxen Sodium (ALEVE PO) Take 220 mg by mouth nightly as needed for moderate pain       trimethoprim-polymyxin b (POLYTRIM) 13086-9.1 UNIT/ML-% ophthalmic solution Place 1-2 drops into both eyes every 4 hours for 7 days 5 mL 0         PE: No acute distress on physical exam.  Vital signs are normal-see chart.  Examination of both eyes reveals diffuse conjunctival erythema.  No foreign body.  PERRLA EOMI.  No periorbital cellulitis changes.        TREATMENT: None.      ASSESSMENT: Conjunctivitis both eyes, most likely bacterial with no complicating symptoms or findings.      DIAGNOSIS: Conjunctivitis both eyes.      PLAN: Polytrim drops as instructed.  Follow-up with ophthalmology 4 to 5 days if symptoms persist, sooner if worse.     HOME INSTRUCTIONS    For Breakfast:  Try to add a protein source to breakfast (egg, low fat string cheese, yogurt (low fat greek), nuts low sodium)         Meal Planning   Eat regularly during the day, every 4-5 hours, Do not skip meals.  Include consistent portions and meal times.  Include healthy snacks as needed.  Use plate method to plan meals and control portions.  Include a good source of protein at all meals and snacks.  Keep food records using phone amanda or written records.    Plate Method:  Eat regularly during the day, every 4-5 hours.  Aim for at least 3 of our 5 food groups at each meal (fruit/vegetable, lean protein, dairy/grain).  Aim for 3 meals per day, every 4-5 hours.   Snacks if meal times will be longer than 4-5 hours apart.  Snacks should have at least 2 of our 5 food groups (fruit/vegetable, lean protein, dairy/grain).  Aim for 3 cups of fresh/frozen vegetables each day.   Aim for 2 cups OR 2 pieces of fresh/frozen fruit (about the size of a tennis ball) each day.  Aim for 3 servings of dairy each day (1 ounce of cheese, 6 ounces greek yogurt, 8 ounces of low-fat reduced fat milk).  Aim for at least 64 ounces of water daily.  Lean protein  Choose MOST often (4-6 times per week): tuna, salmon, shellfish, eggs, chicken breast, turkey breast, nuts/nut butters, legumes/beans, tofu  Choose LESS often (1-2 times per week): beef/pork tenderloin  Make at least half of our grains WHOLE (wheat, brown/wild rice, whole grain pasta, red lentil/chickpea pasta, quinoa, farro).  Good source of fiber = 3 grams per serving  Great source of fiber = greater than or equal to 5 grams per serving  Choose LIQUID fats/unsaturated fats MOST often (olive, canola, vegetable, sunflower, avocado oil).  When choosing SOLID fats choose butter (limit coconut oil, and palm oil).    Physical Activity   Aim for a minimum of 150 minutes of exercise per week, work up to performing 300 minutes of exercise weekly (60 minutes, 5 times  per week OR 45 minutes 6 times per week). This could include walking, biking, jogging, aerobics, swimming, basketball, or any other \"aerobic\" activity.     Increase walking by parking further from store entrance, taking the stairs rather than the elevator and increasing your steps throughout the day.      Goal Planning:   The meal plan will also require behavior change.   To accomplish this the goal you selected is: I will decrease eating out to 1-2 times per week.      [employer]: I highly recommend that you sign up for [facility], if you don't already have this. You will be able to handle scheduling and canceling [facility] appointments, review your lab work, review scheduled appointments, your medication list, and ask your provider and educator questions, without waiting on the phone. The  or I can help you do this. Such a time saver!

## 2023-06-26 ENCOUNTER — ANCILLARY PROCEDURE (OUTPATIENT)
Dept: GENERAL RADIOLOGY | Facility: CLINIC | Age: 83
End: 2023-06-26
Attending: STUDENT IN AN ORGANIZED HEALTH CARE EDUCATION/TRAINING PROGRAM
Payer: COMMERCIAL

## 2023-06-26 ENCOUNTER — OFFICE VISIT (OUTPATIENT)
Dept: FAMILY MEDICINE | Facility: CLINIC | Age: 83
End: 2023-06-26
Payer: COMMERCIAL

## 2023-06-26 VITALS
RESPIRATION RATE: 14 BRPM | TEMPERATURE: 97.9 F | WEIGHT: 113 LBS | HEIGHT: 63 IN | BODY MASS INDEX: 20.02 KG/M2 | SYSTOLIC BLOOD PRESSURE: 128 MMHG | HEART RATE: 56 BPM | OXYGEN SATURATION: 93 % | DIASTOLIC BLOOD PRESSURE: 68 MMHG

## 2023-06-26 DIAGNOSIS — M54.50 CHRONIC BILATERAL LOW BACK PAIN WITHOUT SCIATICA: Primary | ICD-10-CM

## 2023-06-26 DIAGNOSIS — R29.898 COGWHEEL RIGIDITY: ICD-10-CM

## 2023-06-26 DIAGNOSIS — G89.29 CHRONIC BILATERAL LOW BACK PAIN WITHOUT SCIATICA: ICD-10-CM

## 2023-06-26 DIAGNOSIS — Z00.00 ENCOUNTER FOR MEDICARE ANNUAL WELLNESS EXAM: ICD-10-CM

## 2023-06-26 DIAGNOSIS — M54.50 CHRONIC BILATERAL LOW BACK PAIN WITHOUT SCIATICA: ICD-10-CM

## 2023-06-26 DIAGNOSIS — M81.0 AGE-RELATED OSTEOPOROSIS WITHOUT CURRENT PATHOLOGICAL FRACTURE: ICD-10-CM

## 2023-06-26 DIAGNOSIS — R41.3 MEMORY DEFICIT: ICD-10-CM

## 2023-06-26 DIAGNOSIS — I10 ESSENTIAL HYPERTENSION, BENIGN: ICD-10-CM

## 2023-06-26 DIAGNOSIS — Z98.890 HISTORY OF LUMBAR SURGERY: ICD-10-CM

## 2023-06-26 DIAGNOSIS — G89.29 CHRONIC BILATERAL LOW BACK PAIN WITHOUT SCIATICA: Primary | ICD-10-CM

## 2023-06-26 PROCEDURE — G0439 PPPS, SUBSEQ VISIT: HCPCS | Performed by: STUDENT IN AN ORGANIZED HEALTH CARE EDUCATION/TRAINING PROGRAM

## 2023-06-26 PROCEDURE — 72100 X-RAY EXAM L-S SPINE 2/3 VWS: CPT | Mod: TC | Performed by: RADIOLOGY

## 2023-06-26 PROCEDURE — 99214 OFFICE O/P EST MOD 30 MIN: CPT | Mod: 25 | Performed by: STUDENT IN AN ORGANIZED HEALTH CARE EDUCATION/TRAINING PROGRAM

## 2023-06-26 RX ORDER — LISINOPRIL 2.5 MG/1
2.5 TABLET ORAL DAILY
Qty: 90 TABLET | Refills: 3 | Status: SHIPPED | OUTPATIENT
Start: 2023-06-26 | End: 2023-07-10 | Stop reason: DRUGHIGH

## 2023-06-26 ASSESSMENT — PAIN SCALES - GENERAL: PAINLEVEL: MODERATE PAIN (5)

## 2023-06-26 ASSESSMENT — ENCOUNTER SYMPTOMS: BACK PAIN: 1

## 2023-06-26 NOTE — PATIENT INSTRUCTIONS
Patient Education   Personalized Prevention Plan  You are due for the preventive services outlined below.  Your care team is available to assist you in scheduling these services.  If you have already completed any of these items, please share that information with your care team to update in your medical record.  Health Maintenance Due   Topic Date Due     Depression Assessment  07/25/2023       Exercise for a Healthier Heart  You may wonder how you can improve the health of your heart. If you re thinking about exercise, you re on the right track. You don t need to become an athlete. But you do need a certain amount of brisk exercise to help strengthen your heart. If you have been diagnosed with a heart condition, your healthcare provider may advise exercise to help your condition. To help make exercise a habit, choose safe, fun activities.      Exercise with a friend. When activity is fun, you're more likely to stick with it.     Before you start  Check with your healthcare provider before starting an exercise program. This is especially important if you haven't been active for a while. It's also important if you have a long-term (chronic) health problem such as heart disease, diabetes, or obesity. Also check with your provider if you're at high risk for having these problems.   Why exercise?  Exercising regularly offers many healthy rewards. It can help you do all of these:     Improve your blood cholesterol level to help prevent further heart trouble.    Lower your blood pressure to help prevent a stroke or heart attack.    Control diabetes or reduce your risk of getting this disease.    Improve your heart and lung function.    Reach and stay at a healthy weight.    Make your muscles stronger so you can stay active.    Prevent falls and fractures by slowing the loss of bone mass (osteoporosis).    Manage stress better.    Improve your sense of self and your body image.  Exercise tips      Ease into your  routine. Set small goals. Then build on them. Talk with your healthcare provider first before starting an exercise routine if you're not sure what your activity level should be.    Exercise on most days. Aim for a total of at least 150 minutes (2 hours and 30 minutes) or more of moderate-intensity aerobic activity each week. You could also do 75 minutes (1 hour and 15 minutes) or more of vigorous-intensity aerobic activity each week. Or try for a combination of both. Moderate activity means that you breathe heavier and your heart rate increases, but you can still talk. Think about doing at least 30 minutes of moderate exercise, 5 times a week. It's OK to work up to the 30-minute period over time. Examples of moderate-intensity activity are brisk walking, gardening, and water aerobics.    Step up your daily activity level.  Along with your exercise program, try being more active the whole day. Walk instead of drive. Or park further away so that you take more steps each day. Do more household tasks or yard work. You may not be able to meet the advised amount of physical activity. But doing some moderate- or vigorous-intensity aerobic activity can help reduce your risk for heart disease. Your healthcare provider can help you figure out what is best for you.    Choose 1 or more activities you enjoy.  Walking is one of the easiest things you can do. You can also try swimming, riding a bike, dancing, or taking an exercise class.    Call 911  Call 911 right away if any of these occur:     Chest pain that doesn't go away quickly with rest    New burning, tightness, pressure, or heaviness in your chest, neck, shoulders, back, or arms    Abnormal or severe shortness of breath    A very fast or irregular heartbeat (palpitations)    Fainting  When to call your healthcare provider  Call your healthcare provider if you have any of these:     Dizziness or lightheadedness    Mild shortness of breath or chest pain    Increased or  new joint or muscle pain    [employer] last reviewed this educational content on 7/1/2022 2000-2022 [employer]. All rights reserved. This information is not intended as a substitute for professional medical care. Always follow your healthcare professional's instructions.          Understanding USDA MyPlate  The USDA has guidelines to help you make healthy food choices. These are called MyPlate. MyPlate shows the food groups that make up healthy meals using the image of a place setting. Before you eat, think about the healthiest choices for what to put on your plate or in your cup or bowl. To learn more about building a healthy plate, visit www.choosemyplate.gov.     The food groups    Fruits. Any fruit or 100% fruit juice counts as part of the Fruit Group. Fruits may be fresh, canned, frozen, or dried, and may be whole, cut-up, or pureed. Make 1/2 of your plate fruits and vegetables.    Vegetables. Any vegetable or 100% vegetable juice counts as a member of the Vegetable Group. Vegetables may be fresh, frozen, canned, or dried. They can be served raw or cooked and may be whole, cut-up, or mashed. Make 1/2 of your plate fruits and vegetables.    Grains. All foods made from grains are part of the Grains Group. These include wheat, rice, oats, cornmeal, and barley. Grains are often used to make foods such as bread, pasta, oatmeal, cereal, tortillas, and grits. Grains should be no more than 1/4 of your plate. At least half of your grains should be whole grains.    Protein. This group includes meat, poultry, seafood, beans and peas, eggs, processed soy products (such as tofu), nuts (including nut butters), and seeds. Make protein choices no more than 1/4 of your plate. Meat and poultry choices should be lean or low fat.    Dairy. The Dairy Group includes all fluid milk products and foods made from milk that contain calcium, such as yogurt and cheese. (Foods that have little calcium, such as cream, butter,  and cream cheese, are not part of this group.) Most dairy choices should be low-fat or fat-free.    Oils. Oils aren't a food group, but they do contain essential nutrients. However it's important to watch your intake of oils. These are fats that are liquid at room temperature. They include canola, corn, olive, soybean, vegetable, and sunflower oil. Foods that are mainly oil include mayonnaise, certain salad dressings, and soft margarines. You likely already get your daily oil allowance from the foods you eat.  Things to limit  Eating healthy also means limiting these things in your diet:    Salt (sodium). Many processed foods have a lot of sodium. To keep sodium intake down, eat fresh vegetables, meats, poultry, and seafood when possible. Purchase low-sodium, reduced-sodium, or no-salt-added food products at the store. And don't add salt to your meals at home. Instead, season them with herbs and spices such as dill, oregano, cumin, and paprika. Or try adding flavor with lemon or lime zest and juice.    Saturated fat. Saturated fats are most often found in animal products such as beef, pork, and chicken. They are often solid at room temperature, such as butter. To reduce your saturated fat intake, choose leaner cuts of meat and poultry. And try healthier cooking methods such as grilling, broiling, roasting, or baking. For a simple lower-fat swap, use plain nonfat yogurt instead of mayonnaise when making potato salad or macaroni salad.    Added sugars. These are sugars added to foods. They are in foods such as ice cream, candy, soda, fruit drinks, sports drinks, energy drinks, cookies, pastries, jams, and syrups. Cut down on added sugars by sharing sweet treats with a family member or friend. You can also choose fruit for dessert, and drink water or other unsweetened beverages.  [employer] last reviewed this educational content on 6/1/2020 2000-2022 [employer]. All rights reserved. This information is  not intended as a substitute for professional medical care. Always follow your healthcare professional's instructions.          Urinary Incontinence, Female (Adult)   Urinary incontinence means loss of bladder control. This problem affects many women, especially as they get older. If you have incontinence, you may be embarrassed to ask for help. But know that this problem can be treated.   Types of Incontinence  There are different types of incontinence. Two of the main types are described here. You can have more than one type.     Stress incontinence. With this type, urine leaks when pressure (stress) is put on the bladder. This may happen when you cough, sneeze, or laugh. Stress incontinence most often occurs because the pelvic floor muscles that support the bladder and urethra are weak. This can happen after pregnancy and vaginal childbirth or a hysterectomy. It can also be due to excess body weight or hormone changes.    Urge incontinence (also called overactive bladder). With this type, a sudden urge to urinate is felt often. This may happen even though there may not be much urine in the bladder. The need to urinate often during the night is common. Urge incontinence most often occurs because of bladder spasms. This may be due to bladder irritation or infection. Damage to bladder nerves or pelvic muscles, constipation, and certain medicines can also lead to urge incontinence.  Treatment depends on the cause. Further evaluation is needed to find the type you have. This will likely include an exam and certain tests. Based on the results, you and your healthcare provider can then plan treatment. Until a diagnosis is made, the home care tips below can help ease symptoms.   Home care    Do pelvic floor muscle exercises, if they are prescribed. The pelvic floor muscles help support the bladder and urethra. Many women find that their symptoms improve when doing special exercises that strengthen these muscles. To do  the exercises, contract the muscles you would use to stop your stream of urine. But do this when you re not urinating. Hold for 10 seconds, then relax. Repeat 10 to 20 times in a row, at least 3 times a day. Your healthcare provider may give you other instructions for how to do the exercises and how often.    Keep a bladder diary. This helps track how often and how much you urinate over a set period of time. Bring this diary with you to your next visit with the provider. The information can help your provider learn more about your bladder problem.    Lose weight, if advised to by your provider. Extra weight puts pressure on the bladder. Your provider can help you create a weight-loss plan that s right for you. This may include exercising more and making certain diet changes.    Don't have foods and drinks that may irritate the bladder. These can include alcohol and caffeinated drinks.    Quit smoking. Smoking and other tobacco use can lead to a long-term (chronic) cough that strains the pelvic floor muscles. Smoking may also damage the bladder and urethra. Talk with your provider about treatments or methods you can use to quit smoking.    If drinking large amounts of fluid makes you have symptoms, you may be advised to limit your fluid intake. You may also be advised to drink most of your fluids during the day and to limit fluids at night.    If you re worried about urine leakage or accidents, you may wear absorbent pads to catch urine. Change the pads often. This helps reduce discomfort. It may also reduce the risk of skin or bladder infections.    Follow-up care  Follow up with your healthcare provider, or as directed. It may take some to find the right treatment for your problem. But healthy lifestyle changes can be made right away. These include such things as exercising on a regular basis, eating a healthy diet, losing weight (if needed), and quitting smoking. Your treatment plan may include special therapies  or medicines. Certain procedures or surgery may also be options. Talk about any questions you have with your provider.   When to seek medical advice  Call the healthcare provider right away if any of these occur:    Fever of 100.4 F (38 C) or higher, or as directed by your provider    Bladder pain or fullness    Belly swelling    Nausea or vomiting    Back pain    Weakness, dizziness, or fainting  [employer] last reviewed this educational content on 1/1/2020 2000-2022 [employer]. All rights reserved. This information is not intended as a substitute for professional medical care. Always follow your healthcare professional's instructions.          Preventing Falls at Home  A person can fall for many reasons. Older adults may fall because reaction time slows down as we age. Your muscles and joints may get stiff, weak, or less flexible because of illness, medicines, or a physical condition.   Other health problems that make falls more likely include:     Arthritis    Dizziness or lightheadedness when you stand up (orthostatic hypotension)    History of a stroke    Dizziness    Anemia    Certain medicines taken for mental illness or to control blood pressure.    Problems with balance or gait    Bladder or urinary problems    History of falling    Changes in vision (vision impairment)    Changes in thinking skills and memory (cognitive impairment)  Injuries from a fall can include serious injuries such as broken bones, dislocated joints, internal bleeding and cuts. Injuries like these can limit your independence.   Prevention tips  To help prevent falls and fall-related injuries, follow the tips below.    Floors  To make floors safer:     Put nonskid pads under area rugs.    Remove small rugs.    Replace worn floor coverings.    Tack carpets firmly to each step on carpeted stairs. Put nonskid strips on the edges of uncarpeted stairs.    Keep floors and stairs free of clutter and cords.    Arrange furniture  so there are clear pathways.    Clean up any spills right away.  Bathrooms    To make bathrooms safer:     Install grab bars in the tub or shower.    Apply nonskid strips or put a nonskid rubber mat in the tub or shower.    Sit on a bath chair to bathe.    Use bathmats with nonskid backing.  Lighting  To improve visibility in your home:      Keep a flashlight in each room. Or put a lamp next to the bed within easy reach.    Put nightlights in the bedrooms, hallways, kitchen, and bathrooms.    Make sure all stairways have good lighting.    Take your time when going up and down stairs.    Put handrails on both sides of stairs and in walkways for more support. To prevent injury to your wrist or arm, don t use handrails to pull yourself up.    Install grab bars to pull yourself up.    Move or rearrange items that you use often. This will make them easier to find or reach.    Look at your home to find any safety hazards. Especially look at doorways, walkways, and the driveway. Remove or repair any safety problems that you find.  Other changes to make    Look around to find any safety hazards. Look closely at doorways, walkways, and the driveway. Remove or repair any safety problems that you find.    Wear shoes that fit well.    Take your time when going up and down stairs.    Put handrails on both sides of stairs and in walkways for more support. To prevent injury to your wrist or arm, don t use handrails to pull yourself up.    Install grab bars wherever needed to pull yourself up.    Arrange items that you use often. This will make them easier to find or reach.    [employer] last reviewed this educational content on 3/1/2020    5733-6785 [employer]. All rights reserved. This information is not intended as a substitute for professional medical care. Always follow your healthcare professional's instructions.

## 2023-06-26 NOTE — PROGRESS NOTES
Assessment & Plan     Encounter for Medicare annual wellness exam  Patient is a very pleasant 83-year-old who presents today for back pain, and we did her medical annual as an add-on.  - PRIMARY CARE FOLLOW-UP SCHEDULING    Memory deficit  On Medicare annual, patient was noted to have memory deficits, failing the mini cog.  She is given a referral to Occupational Therapy for more formal testing.  See more information below for cogwheel rigidity regarding referral for neurology.  - Occupational Therapy Referral  - Adult Neurology  Referral    Essential hypertension, benign  Patient's blood pressure is very well controlled currently.  She is noting some episodes of lightheadedness at times.  She also has had multiple falls.  In review of her last BMP from January of this year, her renal function is well-maintained.  We will keep her on 2.5 mg of lisinopril, decreased dose from previous, given the very well-controlled blood pressures today, concern for possible hypotension contributing to some of her falls.  - lisinopril (ZESTRIL) 2.5 MG tablet  Dispense: 90 tablet; Refill: 3  - PRIMARY CARE FOLLOW-UP SCHEDULING    Age-related osteoporosis without current pathological fracture  History of lumbar surgery  Chronic bilateral low back pain without sciatica  Patient has had ongoing low back pain.  She does not have any bony tenderness in her lumbar spine, but does have tenderness to bilateral low back musculature.  She has had multiple falls, history of spine surgery.  Does not have any radiating symptoms reported today, no obvious weakness in the lower extremities.  We discussed today that symptoms are most likely musculoskeletal in nature given the tenderness on exam, lack of bony findings.  However, she requests a lumbar spine x-ray today, which is completed.  It does show signs of both osteoporosis as well as degenerative arthritic changes.  Offered patient referral to pain/spine for consideration of  injections, as well as PT.  Awaiting patient's response.  - XR Lumbar Spine 2/3 Views    Cogwheel rigidity  On exam today, patient has some mild cogwheel rigidity particularly in the right upper extremity.  Given her recurrent falls, shuffling gait, I am actually quite concerned for Parkinson's disease.  She is given a referral to neurology today, hopefully will see them soon given this new diagnosis.  In addition, she is having some cognitive decrease as above as well, which may be attributed to Parkinson's if that is in fact the underlying issue.  No obvious tremor on exam today.  - Adult Neurology  Referral      No LOS data to display   Time spent by me doing chart review, history and exam, documentation and further activities per the note     Aisha Hull MD  [facility]    Jared Reynolds is a 83 year old, presenting for the following health issues:  Back Pain and Wellness Visit        6/26/2023     8:36 AM   Additional Questions   Roomed by Hollie   Accompanied by Self     Back Pain   This is a chronic problem. The current episode started more than 1 week ago. The problem occurs constantly. The problem has been gradually worsening. The pain is associated with falling. The quality of the pain is described as aching. The pain radiates to the right thigh and left thigh. The pain is at a severity of 5/10. The pain is moderate. The symptoms are aggravated by bending and twisting. The pain is the same all the time. Stiffness is present in the morning. She has tried nothing for the symptoms. The treatment provided no relief.      Falls a lot.   Drags her feet     Doesn't think she was treated at all for osteoporosis. Had osteoporosis noted on dexa in 2010.     Falls,   Does gardening. The unlevel ground, has fallen backwards and forwards.   When walks, walks funny? Family says she isn't picking up her feet.   Her father had issues with wlking in the past.   Drags feet.  "    Lives alone.     Sometimes with RLQ pain    11/17/2009  1.  Left L3 hemilaminectomy, left L3-4 medial facetectomy, left L4 foraminotomy. 2.  Left L4 hemilaminectomy, left L4-5 medial facetectomy, left L5 foraminotomy.     No numbness/tingling in the legs  Sometimes with arm and leg weakness    Most recent fall was 3 months ago.   No significant change in pain at that time.     No restless leg symtoms at night    Sometimes with jeyson horse.    Declines pt    Annual Wellness Visit    Patient has been advised of split billing requirements and indicates understanding: Yes     Are you in the first 12 months of your Medicare Part B coverage?  No    Physical Health:    In general, how would you rate your overall physical health? good    Outside of work, how many days during the week do you exercise?none    Outside of work, approximately how many minutes a day do you exercise?not applicable    If you drink alcohol do you typically have >3 drinks per day or >7 drinks per week? No    Do you usually eat at least 4 servings of fruit and vegetables a day, include whole grains & fiber and avoid regularly eating high fat or \"junk\" foods? No    Do you have any problems taking medications regularly? No    Do you have any side effects from medications? none    Needs assistance for the following daily activities: no assistance needed    Which of the following safety concerns are present in your home?  none identified     Hearing impairment: No    In the past 6 months, have you been bothered by leaking of urine? yes    Mental Health:    In general, how would you rate your overall mental or emotional health? good  PHQ-2 Score:      Do you feel safe in your environment? Yes        1/7/2021     2:11 PM 1/20/2022    10:21 AM 1/25/2023     2:02 PM   PHQ   PHQ-9 Total Score 2 10 4   Q9: Thoughts of better off dead/self-harm past 2 weeks Not at all Not at all Not at all     Fall risk:  Fallen 2 or more times in the past year?: Yes  Any " "fall with injury in the past year?: Yes  Timed Up and Go Test (>13.5 is fall risk; contact physician) : 11    Cognitive Screenin) Repeat 3 items (Leader, Season, Table)    2) Clock draw: ABNORMAL 10 hand  3) 3 item recall: Recalls NO objects   Results: 0 items recalled: PROBABLE COGNITIVE IMPAIRMENT, **INFORM PROVIDER**    Mini-CogTM Copyright LAYO Worthington. Licensed by the author for use in [facility]; reprinted with permission ([email or web address]@[facility]). All rights reserved.        Social History     Tobacco Use     Smoking status: Never     Smokeless tobacco: Never   Substance Use Topics     Alcohol use: Yes     Comment: Occasional       Do you have a current opioid prescription? No  Do you use any other controlled substances or medications that are not prescribed by a provider? None    Current providers sharing in care for this patient include:   Patient Care Team:  Yue Du APRN CNP as PCP - General (Family Medicine)  ERINN Ricardo MD as MD (Urology)  Yue Du APRN CNP as Assigned PCP  John Mihcael MD as MD (Neurology)    Patient has been advised of split billing requirements and indicates understanding: Yes    I have reviewed Opioid Use Disorder and Substance Use Disorder risk factors and made any needed referrals.       Review of Systems   Musculoskeletal: Positive for back pain.      Constitutional, HEENT, cardiovascular, pulmonary, GI, , musculoskeletal, neuro, skin, endocrine and psych systems are negative, except as otherwise noted.      Objective    /68 (BP Location: Right arm, Patient Position: Sitting, Cuff Size: Adult Regular)   Pulse 56   Temp 97.9  F (36.6  C) (Tympanic)   Resp 14   Ht 1.594 m (5' 2.75\")   Wt 51.3 kg (113 lb)   SpO2 93%   BMI 20.18 kg/m    Body mass index is 20.18 kg/m .  Physical Exam  Constitutional:       Appearance: Normal appearance.   HENT:      Head: Normocephalic.   Eyes:      General: No scleral icterus.     Extraocular Movements: " Extraocular movements intact.      Conjunctiva/sclera: Conjunctivae normal.   Cardiovascular:      Rate and Rhythm: Normal rate and regular rhythm.      Heart sounds: Normal heart sounds.   Pulmonary:      Effort: Pulmonary effort is normal.      Breath sounds: Normal breath sounds.   Musculoskeletal:         General: Tenderness (bilateral low back musculature) present.      Comments: No bony tenderness to spinous processes   Neurological:      General: No focal deficit present.      Mental Status: She is alert and oriented to person, place, and time.      Motor: No pronator drift.      Coordination: Heel to Shin Test normal.      Comments: Cogwheel rigidity mild, more notable in right upper extremity. Shuffling gait            Results from this visit  Results for orders placed or performed in visit on 06/26/23   XR Lumbar Spine 2/3 Views     Status: None    Narrative    XR LUMBAR SPINE TWO-THREE VIEWS  6/26/2023 9:45 AM     HISTORY: Recurrent fall. History of lumbar surgery. No bony tenderness  on exam, evaluate alignment; Chronic bilateral low back pain without  sciatica      COMPARISON: MRI lumbar spine 4/17/2010.      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  There appears to be grossly unchanged mild L5 vertebral body height  loss. No other gross vertebral body height loss is identified. There  is diffuse osseous demineralization, limiting evaluation for fracture.  Minimal broad levoconvex curvature of the visualized thoracolumbar  spine. Alignment otherwise appears normal. Moderate to severe disc  space narrowing L3-L4 and L4-L5. There is at least moderate disc space  narrowing T12-L1 with milder degrees of disc space narrowing  elsewhere. Mild/moderate multilevel degenerative facet disease.  Degenerative changes of the pubic symphysis. Several scattered  presumed calcified phleboliths project  over the pelvis.    OLGA ANN MD         SYSTEM ID:  [id number]                     She is at risk for lack of exercise and has been provided with information to increase physical activity for the benefit of her well-being.  The patient was counseled and encouraged to consider modifying their diet and eating habits. She was provided with information on recommended healthy diet options.  Information on urinary incontinence and treatment options given to patient.  She is at risk for falling and has been provided with information to reduce the risk of falling at home.

## 2023-07-10 ENCOUNTER — ALLIED HEALTH/NURSE VISIT (OUTPATIENT)
Dept: FAMILY MEDICINE | Facility: CLINIC | Age: 83
End: 2023-07-10
Payer: COMMERCIAL

## 2023-07-10 VITALS — RESPIRATION RATE: 16 BRPM | SYSTOLIC BLOOD PRESSURE: 102 MMHG | HEART RATE: 72 BPM | DIASTOLIC BLOOD PRESSURE: 71 MMHG

## 2023-07-10 DIAGNOSIS — Z01.30 BLOOD PRESSURE CHECK: ICD-10-CM

## 2023-07-10 DIAGNOSIS — I10 ESSENTIAL HYPERTENSION, BENIGN: Primary | ICD-10-CM

## 2023-07-10 PROCEDURE — 99207 PR NO CHARGE NURSE ONLY: CPT

## 2023-07-10 NOTE — PROGRESS NOTES
BP Readings from Last 6 Encounters:   06/26/23 128/68   04/29/23 131/78   01/25/23 120/82   01/20/22 124/70   03/04/20 131/72   02/12/20 116/78     Gail Cuba is a 83 year old year old patient who comes in today for a Blood Pressure check because of medication change.  Vital Signs as repeated by /71 and pulse 72  Patient is taking medication as prescribed  Patient is tolerating medications well.  Patient is not monitoring Blood Pressure at home.   Current complaints: none.  States the feeling of lightheadedness is gone.  No falls.  Disposition:  huddled with provider because of lower BP.  Per Dr Hlul- discontinue the lisinopril 2.5 mg daily.  See clinic RN in 2 weeks.  Slime Shea RN

## 2023-07-24 ENCOUNTER — ALLIED HEALTH/NURSE VISIT (OUTPATIENT)
Dept: FAMILY MEDICINE | Facility: CLINIC | Age: 83
End: 2023-07-24
Payer: COMMERCIAL

## 2023-07-24 VITALS — SYSTOLIC BLOOD PRESSURE: 114 MMHG | HEART RATE: 64 BPM | RESPIRATION RATE: 16 BRPM | DIASTOLIC BLOOD PRESSURE: 70 MMHG

## 2023-07-24 DIAGNOSIS — I10 ESSENTIAL HYPERTENSION, BENIGN: Primary | ICD-10-CM

## 2023-07-24 PROCEDURE — 99207 PR NO CHARGE NURSE ONLY: CPT

## 2023-07-24 NOTE — PROGRESS NOTES
Gail Cuba is a 83 year old year old patient who comes in today for a Blood Pressure check because stopped the lisinopril 2.5 mg on 7/11/23.  Vital Signs as repeated by RN:   114/70 and pulse 64.  Patient is taking medication as prescribed  Patient is tolerating medications well.  Patient is not monitoring Blood Pressure at home.  just got a BP monitor for home use from a friend.. will try to check at home  Current complaints: none  Disposition:  patient instructed that does not have to come back for BP unless wants to or could bring in home BP monitor if having trouble figuring it out  Slime Shea RN

## 2023-08-17 ENCOUNTER — MYC MEDICAL ADVICE (OUTPATIENT)
Dept: FAMILY MEDICINE | Facility: CLINIC | Age: 83
End: 2023-08-17
Payer: COMMERCIAL

## 2023-09-11 NOTE — PROGRESS NOTES
NEUROLOGY CONSULTATION NOTE       [facility] [place]  [address] [place] [address]  Tel: [phone]  Fax: [phone]  [email or web address]     Gail Cuba,  1940, MRN [id number]  PCP: Aisha Hull  Date: 2023     ASSESSMENT & PLAN     Visit Diagnosis  Neurocognitive disorder     Neurocognitive disorder  83-year-old female with history of HTN, recurrent depression and anxiety with progressive cognitive decline associated with gait difficulty and bladder incontinence.  The triad raise the possibility of normal pressure hydrocephalus.  Other possibilities include Alzheimer's dementia and vascular dementia.  I have recommended:    1.  MRI brain  2.  Neuropsychological evaluation  3.  Lab work to include folate, homocystine, MMA, RPR, TSH, B1, B12 and vitamin E  4.  If MRI scan raises the possibility of normal pressure hydrocephalus, I will schedule her for cisternogram, get up and go test and large-volume tap.  5.  Follow-up in 6 months    Thank you again for this referral, please feel free to contact me if you have any questions.    John Michael MD  [facility]  (Formerly, [facility]     REASON FOR CONSULTATION Memory Loss        HISTORY OF PRESENT ILLNESS     We have been requested by Dr. Hull to evaluate Gail Cuba who is a 83 year old  female for cognitive decline    Patient is a 85-year-old female with history of HTN, recurrent depression with anxiety who was referred for evaluation of progressive cognitive decline.  She lives alone and reports that in the last few years she has noticed steady decline in her memory along with imbalance and bladder incontinence.  She goes to the bathroom more frequently and has accidents.  She lives alone and has noticed that when she goes out in the yard her balance is not that good and she falls down.  She denies any hallucination or change in her  personality.  Her sister lives across her home and checks on her.  She denies any visual auditory hallucination.  She claims she is taking care of activities of daily living.  She has not done anything that puts her other people at risk and is able to take care of her finances.     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    Surgical History:  She  has a past surgical history that includes surgical history of - ; EXCISION BREAST LESION, OPEN >=1 (6-13-08); breast lumpectomy, rt/lt (1-26-09); surgical history of -  (11/17/09); d & c (5/24/10); Colonoscopy (7/25/2013); Biopsy breast; Lumpectomy breast (2009); Dilation and curettage, operative hysteroscopy, combined (1/14/2014); Phacoemulsification with standard intraocular lens implant (Left, 2/12/2020); and Phacoemulsification with standard intraocular lens implant (Right, 3/4/2020).     SOCIAL HISTORY     Reviewed, and she  reports that she has never smoked. She has never used smokeless tobacco. She reports current alcohol use. She reports that she does not use drugs.     FAMILY HISTORY     Reviewed, and family history includes Alcohol/Drug in her mother; Anxiety Disorder in her sister; Cancer - colorectal in her maternal grandfather; Cerebrovascular Disease in her mother; Depression in her brother and sister; Diabetes in her brother and father; Hyperlipidemia in  "her sister; Hypertension in her mother and son; Musculoskeletal Disorder in her son; Osteoporosis in her sister; Prostate Cancer in her father; Thyroid Disease in her sister.     ALLERGIES     Allergies   Allergen Reactions     Latex      Blistery rash from gloves     Nkda [No Known Drug Allergy]          REVIEW OF SYSTEMS     A 12 point review of system was performed and was negative except as outlined in the history of present illness.     HOME MEDICATIONS     Current Outpatient Rx   Medication Sig Dispense Refill     ASPIRIN 81 MG OR TABS 1 TABLET DAILY       escitalopram (LEXAPRO) 10 MG tablet Take 1 tablet (10 mg) by mouth daily 90 tablet 3     Multiple Vitamins-Minerals (MULTIVITAMIN ADULTS PO) Take by mouth daily           PHYSICAL EXAM     Vital signs  /77 (BP Location: Right arm, Patient Position: Sitting)   Pulse 53   Ht 1.575 m (5' 2\")   Wt 50.3 kg (111 lb)   BMI 20.30 kg/m      Weight:   111 lbs 0 oz      9/12/2023     1:11 PM   MOCA   Visuospatial/Executive  2   Naming 2   Attention - Digits 2   Attention - Letters 1   Attention - Subtraction 0   Language - Repeat 2   Language - Fluency  0   Abstraction 2   Delayed Recall 0   Orientation 6   Education 1   MOCA Score 18   Administered by:  Anne Armstrong CMA       Elderly female who is alert and oriented vital signs were reviewed and documented in electronic medical record.  Neck supple.  Neurologically speech was normal cranial nerves II through XII are intact motor strength 5/5 reflexes 1+ in upper extremity 2+ in the lower extremity toes downgoing.  She has trouble tandem walking Romberg negative.     PERTINENT DIAGNOSTIC STUDIES     Following studies were reviewed:     No recent imaging studies to review     PERTINENT LABS  Following labs were reviewed:  No visits with results within 3 Month(s) from this visit.   Latest known visit with results is:   Office Visit on 01/25/2023   Component Date Value Ref Range Status     Sodium 01/25/2023 " 141  136 - 145 mmol/L Final     Potassium 01/25/2023 4.6  3.4 - 5.3 mmol/L Final     Chloride 01/25/2023 102  98 - 107 mmol/L Final     Carbon Dioxide (CO2) 01/25/2023 32 (H)  22 - 29 mmol/L Final     Anion Gap 01/25/2023 7  7 - 15 mmol/L Final     Urea Nitrogen 01/25/2023 13.2  8.0 - 23.0 mg/dL Final     Creatinine 01/25/2023 0.63  0.51 - 0.95 mg/dL Final     Calcium 01/25/2023 9.5  8.8 - 10.2 mg/dL Final     Glucose 01/25/2023 86  70 - 99 mg/dL Final     GFR Estimate 01/25/2023 88  >60 mL/min/1.73m2 Final     Cholesterol 01/25/2023 211 (H)  <200 mg/dL Final     Triglycerides 01/25/2023 187 (H)  <150 mg/dL Final     Direct Measure HDL 01/25/2023 71  >=50 mg/dL Final     LDL Cholesterol Calculated 01/25/2023 103 (H)  <=100 mg/dL Final     Non HDL Cholesterol 01/25/2023 140 (H)  <130 mg/dL Final        Total time spent for face to face visit, reviewing labs/imaging studies, counseling and coordination of care was: 1 Hour spent on the date of the encounter doing chart review, review of outside records, review of test results, interpretation of tests, patient visit, and documentation     This note was dictated using voice recognition software.  Any grammatical or context distortions are unintentional and inherent to the software.    Orders Placed This Encounter   Procedures     MR Brain w/o & w Contrast     Folate     Homocysteine     Methylmalonic Acid     Treponema Abs w Reflex to RPR and Titer     TSH with free T4 reflex     Vitamin B1 whole blood     Vitamin B12     Vitamin E     Adult Neuropsychology Referral      New Prescriptions    No medications on file      Modified Medications    No medications on file

## 2023-09-12 ENCOUNTER — OFFICE VISIT (OUTPATIENT)
Dept: NEUROLOGY | Facility: CLINIC | Age: 83
End: 2023-09-12
Attending: STUDENT IN AN ORGANIZED HEALTH CARE EDUCATION/TRAINING PROGRAM
Payer: COMMERCIAL

## 2023-09-12 VITALS
SYSTOLIC BLOOD PRESSURE: 136 MMHG | DIASTOLIC BLOOD PRESSURE: 77 MMHG | BODY MASS INDEX: 20.43 KG/M2 | HEART RATE: 53 BPM | HEIGHT: 62 IN | WEIGHT: 111 LBS

## 2023-09-12 DIAGNOSIS — R41.9 NEUROCOGNITIVE DISORDER: Primary | ICD-10-CM

## 2023-09-12 DIAGNOSIS — R29.898 COGWHEEL RIGIDITY: ICD-10-CM

## 2023-09-12 PROCEDURE — 99205 OFFICE O/P NEW HI 60 MIN: CPT | Performed by: PSYCHIATRY & NEUROLOGY

## 2023-09-12 ASSESSMENT — MONTREAL COGNITIVE ASSESSMENT (MOCA)
13. ORIENTATION SUBSCORE: 6
WHAT IS THE TOTAL SCORE (OUT OF 30): 18
11. FOR EACH PAIR OF WORDS, WHAT CATEGORY DO THEY BELONG TO (OUT OF 2): 2
WHAT LEVEL OF EDUCATION WAS ATTAINED: 1
10. [FLUENCY] NAME WORDS STARTING WITH DESIGNATED LETTER: 0
7. [VIGILENCE] TAP WHEN HEARING DESIGNATED LETTER: 1
6. READ LIST OF DIGITS [FORWARD/BACKWARD]: 2
8. SERIAL SUBTRACTION OF 7S: 0
4. NAME EACH OF THE THREE ANIMALS SHOWN: 2
VISUOSPATIAL/EXECUTIVE SUBSCORE: 2
12. MEMORY INDEX SCORE: 0
9. REPEAT EACH SENTENCE: 2

## 2023-09-12 NOTE — LETTER
2023         RE: Gail Cuba  [address]  [facility] [address]        Dear Colleague,    Thank you for referring your patient, Gail Cuba, to the [facility] [place]. Please see a copy of my visit note below.    NEUROLOGY CONSULTATION NOTE       [facility] [place]  [address] [place] [address]  Tel: [phone]  Fax: [phone]  [email or web address]     Gail Cuba,  1940, MRN [id number]  PCP: Aisha Hull  Date: 2023     ASSESSMENT & PLAN     Visit Diagnosis  Neurocognitive disorder     Neurocognitive disorder  83-year-old female with history of HTN, recurrent depression and anxiety with progressive cognitive decline associated with gait difficulty and bladder incontinence.  The triad raise the possibility of normal pressure hydrocephalus.  Other possibilities include Alzheimer's dementia and vascular dementia.  I have recommended:    1.  MRI brain  2.  Neuropsychological evaluation  3.  Lab work to include folate, homocystine, MMA, RPR, TSH, B1, B12 and vitamin E  4.  If MRI scan raises the possibility of normal pressure hydrocephalus, I will schedule her for cisternogram, get up and go test and large-volume tap.  5.  Follow-up in 6 months    Thank you again for this referral, please feel free to contact me if you have any questions.    John Michael MD  [facility]  (Formerly, [facility]     REASON FOR CONSULTATION Memory Loss        HISTORY OF PRESENT ILLNESS     We have been requested by Dr. Hull to evaluate Gail Cuba who is a 83 year old  female for cognitive decline    Patient is a 85-year-old female with history of HTN, recurrent depression with anxiety who was referred for evaluation of progressive cognitive decline.  She lives alone and reports that in the last few years she has noticed steady decline in her memory along  with imbalance and bladder incontinence.  She goes to the bathroom more frequently and has accidents.  She lives alone and has noticed that when she goes out in the yard her balance is not that good and she falls down.  She denies any hallucination or change in her personality.  Her sister lives across her home and checks on her.  She denies any visual auditory hallucination.  She claims she is taking care of activities of daily living.  She has not done anything that puts her other people at risk and is able to take care of her finances.     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    Surgical History:  She  has a past surgical history that includes surgical history of - ; EXCISION BREAST LESION, OPEN >=1 (6-13-08); breast lumpectomy, rt/lt (1-26-09); surgical history of -  (11/17/09); d & c (5/24/10); Colonoscopy (7/25/2013); Biopsy breast; Lumpectomy breast (2009); Dilation and curettage, operative hysteroscopy, combined (1/14/2014); Phacoemulsification with standard intraocular lens implant (Left, 2/12/2020); and Phacoemulsification with standard intraocular lens implant (Right, 3/4/2020).     SOCIAL HISTORY     Reviewed, and she  reports that she has never smoked. She has never used smokeless tobacco. She reports current alcohol use. She reports that she does not use drugs.     FAMILY HISTORY     Reviewed, and  "family history includes Alcohol/Drug in her mother; Anxiety Disorder in her sister; Cancer - colorectal in her maternal grandfather; Cerebrovascular Disease in her mother; Depression in her brother and sister; Diabetes in her brother and father; Hyperlipidemia in her sister; Hypertension in her mother and son; Musculoskeletal Disorder in her son; Osteoporosis in her sister; Prostate Cancer in her father; Thyroid Disease in her sister.     ALLERGIES     Allergies   Allergen Reactions     Latex      Blistery rash from gloves     Nkda [No Known Drug Allergy]          REVIEW OF SYSTEMS     A 12 point review of system was performed and was negative except as outlined in the history of present illness.     HOME MEDICATIONS     Current Outpatient Rx   Medication Sig Dispense Refill     ASPIRIN 81 MG OR TABS 1 TABLET DAILY       escitalopram (LEXAPRO) 10 MG tablet Take 1 tablet (10 mg) by mouth daily 90 tablet 3     Multiple Vitamins-Minerals (MULTIVITAMIN ADULTS PO) Take by mouth daily           PHYSICAL EXAM     Vital signs  /77 (BP Location: Right arm, Patient Position: Sitting)   Pulse 53   Ht 1.575 m (5' 2\")   Wt 50.3 kg (111 lb)   BMI 20.30 kg/m      Weight:   111 lbs 0 oz      9/12/2023     1:11 PM   MOCA   Visuospatial/Executive  2   Naming 2   Attention - Digits 2   Attention - Letters 1   Attention - Subtraction 0   Language - Repeat 2   Language - Fluency  0   Abstraction 2   Delayed Recall 0   Orientation 6   Education 1   MOCA Score 18   Administered by:  Anne Armstrong CMA       Elderly female who is alert and oriented vital signs were reviewed and documented in electronic medical record.  Neck supple.  Neurologically speech was normal cranial nerves II through XII are intact motor strength 5/5 reflexes 1+ in upper extremity 2+ in the lower extremity toes downgoing.  She has trouble tandem walking Romberg negative.     PERTINENT DIAGNOSTIC STUDIES     Following studies were reviewed:     No recent " imaging studies to review     PERTINENT LABS  Following labs were reviewed:  No visits with results within 3 Month(s) from this visit.   Latest known visit with results is:   Office Visit on 01/25/2023   Component Date Value Ref Range Status     Sodium 01/25/2023 141  136 - 145 mmol/L Final     Potassium 01/25/2023 4.6  3.4 - 5.3 mmol/L Final     Chloride 01/25/2023 102  98 - 107 mmol/L Final     Carbon Dioxide (CO2) 01/25/2023 32 (H)  22 - 29 mmol/L Final     Anion Gap 01/25/2023 7  7 - 15 mmol/L Final     Urea Nitrogen 01/25/2023 13.2  8.0 - 23.0 mg/dL Final     Creatinine 01/25/2023 0.63  0.51 - 0.95 mg/dL Final     Calcium 01/25/2023 9.5  8.8 - 10.2 mg/dL Final     Glucose 01/25/2023 86  70 - 99 mg/dL Final     GFR Estimate 01/25/2023 88  >60 mL/min/1.73m2 Final     Cholesterol 01/25/2023 211 (H)  <200 mg/dL Final     Triglycerides 01/25/2023 187 (H)  <150 mg/dL Final     Direct Measure HDL 01/25/2023 71  >=50 mg/dL Final     LDL Cholesterol Calculated 01/25/2023 103 (H)  <=100 mg/dL Final     Non HDL Cholesterol 01/25/2023 140 (H)  <130 mg/dL Final        Total time spent for face to face visit, reviewing labs/imaging studies, counseling and coordination of care was: 1 Hour spent on the date of the encounter doing chart review, review of outside records, review of test results, interpretation of tests, patient visit, and documentation     This note was dictated using voice recognition software.  Any grammatical or context distortions are unintentional and inherent to the software.    Orders Placed This Encounter   Procedures     MR Brain w/o & w Contrast     Folate     Homocysteine     Methylmalonic Acid     Treponema Abs w Reflex to RPR and Titer     TSH with free T4 reflex     Vitamin B1 whole blood     Vitamin B12     Vitamin E     Adult Neuropsychology Referral      New Prescriptions    No medications on file      Modified Medications    No medications on file                Again, thank you for allowing me  to participate in the care of your patient.        Sincerely,        John Michael MD

## 2023-09-12 NOTE — NURSING NOTE
Chief Complaint   Patient presents with    Memory Loss     Concerns of memory loss, unsteadiness, recurrent falls, incontinence - Began about 1 year ago but has had incontinence for several years. Has not had MRI of brain  MOCA 18/30     Anne Armstrong CMA on 9/12/2023 at 1:16 PM

## 2023-09-12 NOTE — NURSING NOTE
JOSE COGNITIVE ASSESSMENT (MOCA)  Version 7.1 Original Version  VISUOSPATIAL/EXECUTIVE               COPY CUBE      [  0  ]                                [    0] DRAW CLOCK (Ten past eleven)  (3 points)    [  1  ]                    [  1  ]               [  0  ]       Contour            Numbers     Hands POINTS                2   / 5   NAMING    [  1 ]                                                                        [   0 ]                                             [   1 ]  Lion                                                                      Arsalan                                Camel             2        / 3   MEMORY Read list of words, subject must repeat them. Do 2 trials, even if 1st trial is successful. Do a recall after 5 minutes  FACE VELVET Oriental orthodox RADHA RED No Points    1st x x x x x     2nd x x x x x    ATTENTION Read list of digits (1 digit/sec) Subject has to repeat in the forward order       [  1  ]   2  1  8  5  4                                [ 1   ] 7 4 2                         2 /2   Read list of letters. The subject must tap with his hand at each letter A. No points if > 2 errors.  [   1 ] F B A C M N A A J K L B A F A K D E A A A J A M O F A A B           1   /1   Serial 7 subtraction starting at 100          [  0  ] 93         [  0  ] 86          [ 0   ] 79          [  0  ] 72         [  0  ] 65   4 or 5 correct subtractions: 3 points,  2 or 3 correct: 2 points,  1correct: 1 point,   0 correct: 0 points       0     /3   LANGUAGE Repeat: I only know that Kofi is the one to help today. [  1   ]                                      The cat always hid under the couch when dogs were in the room. [ 1  ]           2    /2   Fluency: Name maximum number of words in one minute that begin with the letter F                                                                                                                    [   0 ] _10__ (N > 11 words)         0      /1   ABSTRACTION  Similarity between e.g. banana-orange=fruit                                                                   [  1  ] train-bicycle                      [  1  ] watch-ruler          2    /2   DELAYED  RECALL Has to recall words  WITH NO CUE FACE  [   0 ] VELVET  [  0  ] Yarsani  [  0  ]  RADHA  [  0  ] RED  [  0  ] Points for UNCUED recall only        0    /5           OPTIONAL Category cue     1      Multiple choice cue          ORIENTATION  [ 1   ] Date     [  1  ] Month       [  1  ] Year      [   1 ] Day      [  1  ] Place        [  1  ] City     6 /6   TOTAL  Normal > 26/30 Add 1 point if < 12 years education  18 /30

## 2024-01-12 ENCOUNTER — OFFICE VISIT (OUTPATIENT)
Dept: FAMILY MEDICINE | Facility: CLINIC | Age: 84
End: 2024-01-12
Payer: COMMERCIAL

## 2024-01-12 VITALS
BODY MASS INDEX: 20.98 KG/M2 | SYSTOLIC BLOOD PRESSURE: 124 MMHG | TEMPERATURE: 97.9 F | DIASTOLIC BLOOD PRESSURE: 80 MMHG | HEART RATE: 56 BPM | WEIGHT: 114 LBS | RESPIRATION RATE: 16 BRPM | HEIGHT: 62 IN | OXYGEN SATURATION: 100 %

## 2024-01-12 DIAGNOSIS — M54.41 CHRONIC BILATERAL LOW BACK PAIN WITH RIGHT-SIDED SCIATICA: ICD-10-CM

## 2024-01-12 DIAGNOSIS — Z29.11 NEED FOR VACCINATION AGAINST RESPIRATORY SYNCYTIAL VIRUS: ICD-10-CM

## 2024-01-12 DIAGNOSIS — R41.9 NEUROCOGNITIVE DISORDER: ICD-10-CM

## 2024-01-12 DIAGNOSIS — G89.29 CHRONIC BILATERAL LOW BACK PAIN WITH RIGHT-SIDED SCIATICA: ICD-10-CM

## 2024-01-12 DIAGNOSIS — E78.5 HYPERLIPIDEMIA LDL GOAL <130: Primary | ICD-10-CM

## 2024-01-12 DIAGNOSIS — F33.41 RECURRENT MAJOR DEPRESSIVE DISORDER, IN PARTIAL REMISSION (H): ICD-10-CM

## 2024-01-12 LAB
CHOLEST SERPL-MCNC: 196 MG/DL
FASTING STATUS PATIENT QL REPORTED: YES
FOLATE SERPL-MCNC: 25.8 NG/ML (ref 4.6–34.8)
HCYS SERPL-SCNC: 15.6 UMOL/L (ref 0–15)
HDLC SERPL-MCNC: 71 MG/DL
LDLC SERPL CALC-MCNC: 100 MG/DL
NONHDLC SERPL-MCNC: 125 MG/DL
T PALLIDUM AB SER QL: NONREACTIVE
TRIGL SERPL-MCNC: 124 MG/DL
TSH SERPL DL<=0.005 MIU/L-ACNC: 1.26 UIU/ML (ref 0.3–4.2)
VIT B12 SERPL-MCNC: 198 PG/ML (ref 232–1245)

## 2024-01-12 PROCEDURE — 83090 ASSAY OF HOMOCYSTEINE: CPT | Performed by: STUDENT IN AN ORGANIZED HEALTH CARE EDUCATION/TRAINING PROGRAM

## 2024-01-12 PROCEDURE — 99214 OFFICE O/P EST MOD 30 MIN: CPT | Performed by: STUDENT IN AN ORGANIZED HEALTH CARE EDUCATION/TRAINING PROGRAM

## 2024-01-12 PROCEDURE — 83921 ORGANIC ACID SINGLE QUANT: CPT | Performed by: STUDENT IN AN ORGANIZED HEALTH CARE EDUCATION/TRAINING PROGRAM

## 2024-01-12 PROCEDURE — 80061 LIPID PANEL: CPT | Performed by: STUDENT IN AN ORGANIZED HEALTH CARE EDUCATION/TRAINING PROGRAM

## 2024-01-12 PROCEDURE — 84446 ASSAY OF VITAMIN E: CPT | Mod: 90 | Performed by: STUDENT IN AN ORGANIZED HEALTH CARE EDUCATION/TRAINING PROGRAM

## 2024-01-12 PROCEDURE — 84425 ASSAY OF VITAMIN B-1: CPT | Mod: 90 | Performed by: STUDENT IN AN ORGANIZED HEALTH CARE EDUCATION/TRAINING PROGRAM

## 2024-01-12 PROCEDURE — 84443 ASSAY THYROID STIM HORMONE: CPT | Performed by: STUDENT IN AN ORGANIZED HEALTH CARE EDUCATION/TRAINING PROGRAM

## 2024-01-12 PROCEDURE — 99000 SPECIMEN HANDLING OFFICE-LAB: CPT | Performed by: STUDENT IN AN ORGANIZED HEALTH CARE EDUCATION/TRAINING PROGRAM

## 2024-01-12 PROCEDURE — 86780 TREPONEMA PALLIDUM: CPT | Performed by: STUDENT IN AN ORGANIZED HEALTH CARE EDUCATION/TRAINING PROGRAM

## 2024-01-12 PROCEDURE — 82746 ASSAY OF FOLIC ACID SERUM: CPT | Performed by: STUDENT IN AN ORGANIZED HEALTH CARE EDUCATION/TRAINING PROGRAM

## 2024-01-12 PROCEDURE — 82607 VITAMIN B-12: CPT | Performed by: STUDENT IN AN ORGANIZED HEALTH CARE EDUCATION/TRAINING PROGRAM

## 2024-01-12 PROCEDURE — 36415 COLL VENOUS BLD VENIPUNCTURE: CPT | Performed by: STUDENT IN AN ORGANIZED HEALTH CARE EDUCATION/TRAINING PROGRAM

## 2024-01-12 RX ORDER — FAMOTIDINE 20 MG
TABLET ORAL
COMMUNITY
End: 2024-08-12

## 2024-01-12 RX ORDER — RESPIRATORY SYNCYTIAL VIRUS VACCINE 120MCG/0.5
0.5 KIT INTRAMUSCULAR ONCE
Qty: 1 EACH | Refills: 0 | Status: CANCELLED | OUTPATIENT
Start: 2024-01-12 | End: 2024-01-12

## 2024-01-12 RX ORDER — PREDNISOLONE ACETATE 10 MG/ML
SUSPENSION/ DROPS OPHTHALMIC
COMMUNITY
Start: 2023-08-23 | End: 2024-06-11

## 2024-01-12 RX ORDER — TOBRAMYCIN AND DEXAMETHASONE 3; 1 MG/ML; MG/ML
SUSPENSION/ DROPS OPHTHALMIC
COMMUNITY
Start: 2023-08-14 | End: 2024-06-11

## 2024-01-12 RX ORDER — CYCLOSPORINE 0.5 MG/ML
EMULSION OPHTHALMIC
COMMUNITY
Start: 2023-09-18 | End: 2024-06-11

## 2024-01-12 RX ORDER — ESCITALOPRAM OXALATE 10 MG/1
15 TABLET ORAL DAILY
Qty: 135 TABLET | Refills: 3 | Status: SHIPPED | OUTPATIENT
Start: 2024-01-12

## 2024-01-12 ASSESSMENT — ANXIETY QUESTIONNAIRES
GAD7 TOTAL SCORE: 6
GAD7 TOTAL SCORE: 6
6. BECOMING EASILY ANNOYED OR IRRITABLE: MORE THAN HALF THE DAYS
5. BEING SO RESTLESS THAT IT IS HARD TO SIT STILL: NOT AT ALL
7. FEELING AFRAID AS IF SOMETHING AWFUL MIGHT HAPPEN: NOT AT ALL
2. NOT BEING ABLE TO STOP OR CONTROL WORRYING: MORE THAN HALF THE DAYS
3. WORRYING TOO MUCH ABOUT DIFFERENT THINGS: NOT AT ALL
1. FEELING NERVOUS, ANXIOUS, OR ON EDGE: MORE THAN HALF THE DAYS

## 2024-01-12 ASSESSMENT — PATIENT HEALTH QUESTIONNAIRE - PHQ9
SUM OF ALL RESPONSES TO PHQ QUESTIONS 1-9: 4
5. POOR APPETITE OR OVEREATING: NOT AT ALL

## 2024-01-12 ASSESSMENT — PAIN SCALES - GENERAL: PAINLEVEL: NO PAIN (0)

## 2024-01-12 NOTE — PROGRESS NOTES
Assessment & Plan     Hyperlipidemia LDL goal <130  Due for cholesterol check today, collected  - Lipid panel reflex to direct LDL Non-fasting  - Lipid panel reflex to direct LDL Non-fasting    Recurrent major depressive disorder, in partial remission (H24)  Feels she is having an increase in down days. Discussed slight increase dose. Consider decreasing back to 10 mg daily after winter as summer approaches, possible SAD contributing  - escitalopram (LEXAPRO) 10 MG tablet  Dispense: 135 tablet; Refill: 3    Chronic bilateral low back pain with right-sided sciatica  Has had recurrent falls, at risk for fracture given osteoporosis. Suffers from chronic low back pain which can be exacerbated by walking longer distances. Given handicap form today    Neurocognitive disorder  Due for labs per neuro. Collected today.   - Folate  - Homocysteine  - Methylmalonic Acid  - Treponema Abs w Reflex to RPR and Titer  - TSH with free T4 reflex  - Vitamin B1 whole blood  - Vitamin B12  - Vitamin E    Need for vaccination against respiratory syncytial virus  Discussed rsv vaccination today. She declines.       I spent a total of 25 minutes on the day of the visit.   Time spent by me doing chart review, history and exam, documentation and further activities per the note     Aisha Hull MD  [facility]    Jared Reynolds is a 84 year old, presenting for the following health issues:  Anxiety and Forms (Handicap parking form)        1/12/2024    10:15 AM   Additional Questions   Roomed by Hollie CHILDERS   Accompanied by friend Katharine LOPEZ     Anxiety Follow-Up  How are you doing with your anxiety since your last visit? Same, has a few bad days  Are you having other symptoms that might be associated with anxiety? No  Have you had a significant life event? No   Are you feeling depressed? No  Do you have any concerns with your use of alcohol or other drugs? No    Social History     Tobacco Use     "Smoking status: Never    Smokeless tobacco: Never   Vaping Use    Vaping Use: Never used   Substance Use Topics    Alcohol use: Yes     Comment: Occasional    Drug use: No         10/10/2019     8:41 AM 1/20/2022    10:21 AM 1/12/2024    10:22 AM   MARTIN-7 SCORE   Total Score 4 9 6         1/20/2022    10:21 AM 1/25/2023     2:02 PM 1/12/2024    10:22 AM   PHQ   PHQ-9 Total Score 10 4 4   Q9: Thoughts of better off dead/self-harm past 2 weeks Not at all Not at all Not at all       Review of Systems   Constitutional, HEENT, cardiovascular, pulmonary, gi and gu systems are negative, except as otherwise noted.      Objective    /80 (BP Location: Right arm, Patient Position: Sitting, Cuff Size: Adult Regular)   Pulse 56   Temp 97.9  F (36.6  C) (Tympanic)   Resp 16   Ht 1.575 m (5' 2\")   Wt 51.7 kg (114 lb)   SpO2 100%   BMI 20.85 kg/m    Body mass index is 20.85 kg/m .  Physical Exam  Constitutional:       Comments: Frail appearing   HENT:      Head: Normocephalic.   Eyes:      General: No scleral icterus.     Extraocular Movements: Extraocular movements intact.      Conjunctiva/sclera: Conjunctivae normal.   Cardiovascular:      Rate and Rhythm: Normal rate.   Pulmonary:      Effort: Pulmonary effort is normal.   Musculoskeletal:         General: Normal range of motion.      Cervical back: Normal range of motion.   Neurological:      General: No focal deficit present.      Mental Status: She is alert and oriented to person, place, and time.           Results from this visitNo results found for any visits on 01/12/24.                  "

## 2024-01-13 DIAGNOSIS — E53.8 VITAMIN B12 DEFICIENCY (NON ANEMIC): Primary | ICD-10-CM

## 2024-01-13 RX ORDER — LANOLIN ALCOHOL/MO/W.PET/CERES
1000 CREAM (GRAM) TOPICAL DAILY
Qty: 30 TABLET | Refills: 6 | Status: SHIPPED | OUTPATIENT
Start: 2024-01-13 | End: 2024-05-09

## 2024-01-13 NOTE — RESULT ENCOUNTER NOTE
Dear Gail    So far lab work shows low vitamin B12 level and an elevated homocystine level that suggest a vitamin B12 deficiency.  Please start vitamin B12 1000 mcg daily.  I have sent a prescription to your pharmacy.. Please continue with the current plan of care and let us know if there are any questions or concerns.    Regards,  John Michael MD

## 2024-01-15 LAB
A-TOCOPHEROL VIT E SERPL-MCNC: 15.3 MG/L
BETA+GAMMA TOCOPHEROL SERPL-MCNC: 2 MG/L

## 2024-01-16 LAB — METHYLMALONATE SERPL-SCNC: 0.32 UMOL/L (ref 0–0.4)

## 2024-01-17 LAB — VIT B1 PYROPHOSHATE BLD-SCNC: 121 NMOL/L

## 2024-02-10 ENCOUNTER — HEALTH MAINTENANCE LETTER (OUTPATIENT)
Age: 84
End: 2024-02-10

## 2024-02-21 ENCOUNTER — HOSPITAL ENCOUNTER (EMERGENCY)
Facility: CLINIC | Age: 84
Discharge: HOME | End: 2024-02-21
Attending: FAMILY MEDICINE | Admitting: FAMILY MEDICINE
Payer: COMMERCIAL

## 2024-02-21 ENCOUNTER — APPOINTMENT (OUTPATIENT)
Dept: CT IMAGING | Facility: CLINIC | Age: 84
End: 2024-02-21
Attending: FAMILY MEDICINE
Payer: COMMERCIAL

## 2024-02-21 ENCOUNTER — APPOINTMENT (OUTPATIENT)
Dept: GENERAL RADIOLOGY | Facility: CLINIC | Age: 84
End: 2024-02-21
Attending: FAMILY MEDICINE
Payer: COMMERCIAL

## 2024-02-21 VITALS
WEIGHT: 114 LBS | OXYGEN SATURATION: 93 % | BODY MASS INDEX: 19.46 KG/M2 | TEMPERATURE: 98.5 F | DIASTOLIC BLOOD PRESSURE: 63 MMHG | SYSTOLIC BLOOD PRESSURE: 122 MMHG | HEART RATE: 126 BPM | HEIGHT: 64 IN | RESPIRATION RATE: 12 BRPM

## 2024-02-21 DIAGNOSIS — I48.91 ATRIAL FIBRILLATION WITH RAPID VENTRICULAR RESPONSE (H): ICD-10-CM

## 2024-02-21 LAB
ALBUMIN UR-MCNC: NEGATIVE MG/DL
ANION GAP SERPL CALCULATED.3IONS-SCNC: 11 MMOL/L (ref 7–15)
APPEARANCE UR: CLEAR
BASOPHILS # BLD AUTO: 0 10E3/UL (ref 0–0.2)
BASOPHILS NFR BLD AUTO: 1 %
BILIRUB UR QL STRIP: NEGATIVE
BUN SERPL-MCNC: 11.4 MG/DL (ref 8–23)
CALCIUM SERPL-MCNC: 8.9 MG/DL (ref 8.8–10.2)
CHLORIDE SERPL-SCNC: 106 MMOL/L (ref 98–107)
COLOR UR AUTO: YELLOW
CREAT SERPL-MCNC: 0.72 MG/DL (ref 0.51–0.95)
CRP SERPL-MCNC: 6.52 MG/L
DEPRECATED HCO3 PLAS-SCNC: 25 MMOL/L (ref 22–29)
EGFRCR SERPLBLD CKD-EPI 2021: 82 ML/MIN/1.73M2
EOSINOPHIL # BLD AUTO: 0 10E3/UL (ref 0–0.7)
EOSINOPHIL NFR BLD AUTO: 1 %
ERYTHROCYTE [DISTWIDTH] IN BLOOD BY AUTOMATED COUNT: 12.8 % (ref 10–15)
ERYTHROCYTE [SEDIMENTATION RATE] IN BLOOD BY WESTERGREN METHOD: 9 MM/HR (ref 0–30)
FLUAV RNA SPEC QL NAA+PROBE: NEGATIVE
FLUBV RNA RESP QL NAA+PROBE: NEGATIVE
GLUCOSE SERPL-MCNC: 95 MG/DL (ref 70–99)
GLUCOSE UR STRIP-MCNC: NEGATIVE MG/DL
HCT VFR BLD AUTO: 41.2 % (ref 35–47)
HGB BLD-MCNC: 13.4 G/DL (ref 11.7–15.7)
HGB UR QL STRIP: NEGATIVE
HOLD SPECIMEN: NORMAL
HOLD SPECIMEN: NORMAL
IMM GRANULOCYTES # BLD: 0 10E3/UL
IMM GRANULOCYTES NFR BLD: 0 %
KETONES UR STRIP-MCNC: 20 MG/DL
LEUKOCYTE ESTERASE UR QL STRIP: NEGATIVE
LYMPHOCYTES # BLD AUTO: 0.8 10E3/UL (ref 0.8–5.3)
LYMPHOCYTES NFR BLD AUTO: 15 %
MCH RBC QN AUTO: 29.2 PG (ref 26.5–33)
MCHC RBC AUTO-ENTMCNC: 32.5 G/DL (ref 31.5–36.5)
MCV RBC AUTO: 90 FL (ref 78–100)
MONOCYTES # BLD AUTO: 0.5 10E3/UL (ref 0–1.3)
MONOCYTES NFR BLD AUTO: 9 %
NEUTROPHILS # BLD AUTO: 4.1 10E3/UL (ref 1.6–8.3)
NEUTROPHILS NFR BLD AUTO: 74 %
NITRATE UR QL: NEGATIVE
NRBC # BLD AUTO: 0 10E3/UL
NRBC BLD AUTO-RTO: 0 /100
PH UR STRIP: 7 [PH] (ref 5–7)
PLATELET # BLD AUTO: 196 10E3/UL (ref 150–450)
POTASSIUM SERPL-SCNC: 4 MMOL/L (ref 3.4–5.3)
RBC # BLD AUTO: 4.59 10E6/UL (ref 3.8–5.2)
RBC URINE: 4 /HPF
RSV RNA SPEC NAA+PROBE: NEGATIVE
SARS-COV-2 RNA RESP QL NAA+PROBE: NEGATIVE
SODIUM SERPL-SCNC: 142 MMOL/L (ref 135–145)
SP GR UR STRIP: 1.02 (ref 1–1.03)
SQUAMOUS EPITHELIAL: 1 /HPF
UROBILINOGEN UR STRIP-MCNC: 2 MG/DL
WBC # BLD AUTO: 5.5 10E3/UL (ref 4–11)
WBC URINE: 3 /HPF

## 2024-02-21 PROCEDURE — 250N000009 HC RX 250: Performed by: FAMILY MEDICINE

## 2024-02-21 PROCEDURE — 96361 HYDRATE IV INFUSION ADD-ON: CPT | Performed by: FAMILY MEDICINE

## 2024-02-21 PROCEDURE — 250N000013 HC RX MED GY IP 250 OP 250 PS 637: Performed by: FAMILY MEDICINE

## 2024-02-21 PROCEDURE — 99285 EMERGENCY DEPT VISIT HI MDM: CPT | Mod: 25 | Performed by: FAMILY MEDICINE

## 2024-02-21 PROCEDURE — 96376 TX/PRO/DX INJ SAME DRUG ADON: CPT | Performed by: FAMILY MEDICINE

## 2024-02-21 PROCEDURE — 85652 RBC SED RATE AUTOMATED: CPT | Performed by: FAMILY MEDICINE

## 2024-02-21 PROCEDURE — 80048 BASIC METABOLIC PNL TOTAL CA: CPT | Performed by: FAMILY MEDICINE

## 2024-02-21 PROCEDURE — 93010 ELECTROCARDIOGRAM REPORT: CPT | Performed by: FAMILY MEDICINE

## 2024-02-21 PROCEDURE — 70450 CT HEAD/BRAIN W/O DYE: CPT

## 2024-02-21 PROCEDURE — 96374 THER/PROPH/DIAG INJ IV PUSH: CPT | Performed by: FAMILY MEDICINE

## 2024-02-21 PROCEDURE — 71046 X-RAY EXAM CHEST 2 VIEWS: CPT

## 2024-02-21 PROCEDURE — 36415 COLL VENOUS BLD VENIPUNCTURE: CPT | Performed by: FAMILY MEDICINE

## 2024-02-21 PROCEDURE — 87637 SARSCOV2&INF A&B&RSV AMP PRB: CPT | Performed by: FAMILY MEDICINE

## 2024-02-21 PROCEDURE — 85025 COMPLETE CBC W/AUTO DIFF WBC: CPT | Performed by: FAMILY MEDICINE

## 2024-02-21 PROCEDURE — 258N000003 HC RX IP 258 OP 636: Performed by: FAMILY MEDICINE

## 2024-02-21 PROCEDURE — 81001 URINALYSIS AUTO W/SCOPE: CPT | Performed by: FAMILY MEDICINE

## 2024-02-21 PROCEDURE — 86140 C-REACTIVE PROTEIN: CPT | Performed by: FAMILY MEDICINE

## 2024-02-21 PROCEDURE — 93005 ELECTROCARDIOGRAM TRACING: CPT | Performed by: FAMILY MEDICINE

## 2024-02-21 RX ORDER — METOPROLOL TARTRATE 25 MG/1
25 TABLET, FILM COATED ORAL ONCE
Status: COMPLETED | OUTPATIENT
Start: 2024-02-21 | End: 2024-02-21

## 2024-02-21 RX ORDER — METOPROLOL TARTRATE 25 MG/1
25 TABLET, FILM COATED ORAL 2 TIMES DAILY
Qty: 90 TABLET | Refills: 0 | Status: SHIPPED | OUTPATIENT
Start: 2024-02-21 | End: 2024-08-12

## 2024-02-21 RX ORDER — METOPROLOL TARTRATE 1 MG/ML
5 INJECTION, SOLUTION INTRAVENOUS EVERY 5 MIN PRN
Status: COMPLETED | OUTPATIENT
Start: 2024-02-21 | End: 2024-02-21

## 2024-02-21 RX ADMIN — METOPROLOL TARTRATE 5 MG: 1 INJECTION, SOLUTION INTRAVENOUS at 14:37

## 2024-02-21 RX ADMIN — METOPROLOL TARTRATE 5 MG: 1 INJECTION, SOLUTION INTRAVENOUS at 15:31

## 2024-02-21 RX ADMIN — SODIUM CHLORIDE 1000 ML: 9 INJECTION, SOLUTION INTRAVENOUS at 12:20

## 2024-02-21 RX ADMIN — METOPROLOL TARTRATE 25 MG: 25 TABLET, FILM COATED ORAL at 16:23

## 2024-02-21 RX ADMIN — METOPROLOL TARTRATE 5 MG: 1 INJECTION, SOLUTION INTRAVENOUS at 13:59

## 2024-02-21 ASSESSMENT — COLUMBIA-SUICIDE SEVERITY RATING SCALE - C-SSRS
6. HAVE YOU EVER DONE ANYTHING, STARTED TO DO ANYTHING, OR PREPARED TO DO ANYTHING TO END YOUR LIFE?: NO
2. HAVE YOU ACTUALLY HAD ANY THOUGHTS OF KILLING YOURSELF IN THE PAST MONTH?: NO
1. IN THE PAST MONTH, HAVE YOU WISHED YOU WERE DEAD OR WISHED YOU COULD GO TO SLEEP AND NOT WAKE UP?: NO

## 2024-02-21 ASSESSMENT — ACTIVITIES OF DAILY LIVING (ADL)
ADLS_ACUITY_SCORE: 35
ADLS_ACUITY_SCORE: 35

## 2024-02-21 NOTE — ED PROVIDER NOTES
HPI   Patient is an 84-year-old female presenting by EMS transport for weakness and falling.  She has a known history of anxiety, taking Lexapro.  She denies recent medication changes.  She has been using this medicine for years.  She has hyperlipidemia.  She was on high blood pressure medication at 1 point but was taken off of this last summer.  She smokes.  She drinks alcohol rarely, none recently.  No other substances of abuse reported.    The patient felt well yesterday but she does report some headache starting last evening.  Her headache continues.  She denies one-sided weakness or incoordination.  No trouble with speech.  No facial droop reported.  Today when she woke up she felt extremely rundown and tired, generally.  She tried to get out of bed and as she did so she slipped to the ground.  With great effort she crawled to the wall and braced against that that she tried to push herself up into a standing position.  She was able to sit on a chair but then slid out of it and fell to the ground again.  She had her phone with her and was able to call 911.  She denies chest pain.  No trouble with breathing.  No abdominal pain.  No recent nausea or vomiting.  No diarrhea.  No hematochezia or melena.  She denies dysuria, urgency, or frequency of urination.  She denies head trauma.  No reported injuries with sliding to the ground.      Allergies:  Allergies   Allergen Reactions    Latex      Blistery rash from gloves    Nkda [No Known Drug Allergy]      Problem List:    Patient Active Problem List    Diagnosis Date Noted    Vitamin B12 deficiency (non anemic) 01/13/2024     Priority: Medium    Recurrent major depressive disorder, in partial remission (H24) 01/20/2022     Priority: Medium    Osteoporosis 06/15/2015     Priority: Medium    Endometrial mass 12/20/2013     Priority: Medium     D and C,hysteroscopy, polypectomy  Stenotic os in clinic-cytotec 200mcg night prior      Anxiety 03/05/2013     Priority:  Medium    Health Care Home 12/14/2012     Priority: Medium     Jasmina Stout RN-PHN  [facility] / [facility]   [phone]    DX V65.8 REPLACED WITH [id number] [facility]ALTH CARE HOME (04/08/2013)      Urge incontinence of urine 07/11/2011     Priority: Medium    Hyperlipidemia LDL goal <130 10/31/2010     Priority: Medium     Recent Labs   Lab Test 9/23/09 0908 6/29/07 0749    CHOL 194 247*    HDL 44* 57    LDL 99 142*    TRIG 252* 239*    CHOLHDLRATIO 4.0 4.0           Back pain 05/20/2010     Priority: Medium     4/17/2010 [facility] MRI IMPRESSION:     1. Postsurgical changes of a posterior decompression/left hemilaminotomy at L3-L4. No central stenosis. However, there is a small to moderate-sized central to left paracentral disc protrusion  with posterior displacement of the left descending L4 nerve root. 2. Foraminal stenosis greatest on the left at L3-L4 and bilaterally at L4-L5.  3. Multilevel degenerative disc disease greatest at L3-L4 and L4-L5 where there is adjacent discogenic endplate reactive marrow edema which appears to have progressed when compared to 5/18/2009.   4. Endometrial thickening, irregularity, and ill definition. Endometrial carcinoma cannot be excluded. There is also a probable anterior pedunculated uterine fibroid.       THICKENING ENDOMETRIUM 05/06/2010     Priority: Medium    Personal history of malignant neoplasm of breast 12/29/2008     Priority: Medium     Infiltrating ductal cancer  - T1 N0  2/19/09 - started XRT in 26 fractions, with boost with at end to cavity.        Past Medical History:    Past Medical History:   Diagnosis Date    Anxiety     Breast cancer (H) 2009    Breast mass     Chest pain     Hypertension     MEDICAL HISTORY OF - 6-08    Urge incontinence      Past Surgical History:    Past Surgical History:   Procedure Laterality Date    BIOPSY BREAST      BREAST LUMPECTOMY, RT/LT  1-26-09    Breast Lumpectomy RT    COLONOSCOPY   7/25/2013    Procedure: COLONOSCOPY;  Colonoscopy  ;  Surgeon: Deep Purcell MD;  Location: [facility]    D & C  5/24/10    for EMB     DILATION AND CURETTAGE, OPERATIVE HYSTEROSCOPY, COMBINED  1/14/2014    Procedure: COMBINED DILATION AND CURETTAGE, OPERATIVE HYSTEROSCOPY;  Hysteroscopy with dilation and curettage, possible polypectomy- choice anes  ;  Surgeon: Lauryn Hoffman MD;  Location: [facility]    HC EXCISION BREAST LESION, OPEN >=1  6-13-08    with sentinal node biopsy    LUMPECTOMY BREAST  2009    right breast    PHACOEMULSIFICATION WITH STANDARD INTRAOCULAR LENS IMPLANT Left 2/12/2020    Procedure: Cataract Removal with Implant;  Surgeon: Jose Cerrato MD;  Location: [facility]    PHACOEMULSIFICATION WITH STANDARD INTRAOCULAR LENS IMPLANT Right 3/4/2020    Procedure: Cataract Removal with Implant;  Surgeon: Jose Cerrato MD;  Location: [facility]    SURGICAL HISTORY OF -       Neck surgery.    SURGICAL HISTORY OF -   11/17/09    L4-5 back surgery     Family History:    Family History   Problem Relation Age of Onset    Alcohol/Drug Mother         alcohalic    Hypertension Mother     Cerebrovascular Disease Mother     Thyroid Disease Sister     Hyperlipidemia Sister     Depression Sister     Anxiety Disorder Sister     Osteoporosis Sister     Musculoskeletal Disorder Son     Hypertension Son     Diabetes Father     Prostate Cancer Father     Cancer - colorectal Maternal Grandfather     Diabetes Brother     Depression Brother     Asthma No family hx of     C.A.D. No family hx of     Breast Cancer No family hx of     Anesthesia Reaction No family hx of     Blood Disease No family hx of     Ovarian Cancer No family hx of      Social History:  Marital Status:  Single [1]  Social History     Tobacco Use    Smoking status: Never    Smokeless tobacco: Never   Vaping Use    Vaping Use: Never used   Substance Use Topics    Alcohol use: Yes     Comment: Occasional    Drug use: No     "  Medications:    metoprolol tartrate (LOPRESSOR) 25 MG tablet  ASPIRIN 81 MG OR TABS  calcium-vitamin D-vitamin K (VIACTIV) 500-500-40 MG-UNT-MCG CHEW  cyanocobalamin (VITAMIN B-12) 1000 MCG tablet  Docosahexaenoic Acid (DHA PO)  escitalopram (LEXAPRO) 10 MG tablet  Multiple Vitamins-Minerals (MULTIVITAMIN ADULTS PO)  prednisoLONE acetate (PRED FORTE) 1 % ophthalmic suspension  RESTASIS 0.05 % ophthalmic emulsion  tobramycin-dexAMETHasone (TOBRADEX) 0.3-0.1 % ophthalmic suspension  Vitamin D, Cholecalciferol, 25 MCG (1000 UT) CAPS      Review of Systems   All other systems reviewed and are negative.      PE   BP: 92/55  Pulse: (!) 149  Temp: 98.5  F (36.9  C)  Resp: 18  Height: 161.3 cm (5' 3.5\")  Weight: 51.7 kg (114 lb)  SpO2: 97 %  Physical Exam  Vitals reviewed.   Constitutional:       Appearance: She is well-developed.      Comments: Thin.  Cooperative and answering questions well.  Pleasant, smiling.  Son in the room and helping with history.   HENT:      Head: Normocephalic and atraumatic.      Right Ear: External ear normal.      Left Ear: External ear normal.      Nose: Nose normal.      Mouth/Throat:      Mouth: Mucous membranes are moist.      Pharynx: Oropharynx is clear.   Eyes:      Extraocular Movements: Extraocular movements intact.      Conjunctiva/sclera: Conjunctivae normal.      Pupils: Pupils are equal, round, and reactive to light.   Cardiovascular:      Rate and Rhythm: Tachycardia present. Rhythm irregular.      Heart sounds: Normal heart sounds.   Pulmonary:      Effort: Pulmonary effort is normal.      Breath sounds: Normal breath sounds.   Abdominal:      Palpations: Abdomen is soft.      Tenderness: There is no abdominal tenderness.   Musculoskeletal:         General: No swelling or tenderness. Normal range of motion.      Cervical back: Normal range of motion.   Skin:     General: Skin is warm and dry.   Neurological:      Mental Status: She is alert and oriented to person, place, and " time.   Psychiatric:         Behavior: Behavior normal.         ED COURSE and MDM   1150.  Patient has symptoms and signs as described above.  Patient has atrial fibrillation with rapid rate.  Her blood pressure on arrival is 92/55, pulse 149.  Afebrile.  Fluid bolus ordered.  Other lab values ordered.  Chest x-ray and CT scan head.    1353.  Urine analysis still not complete.  Patient has received fluid bolus without change in pulse.  Blood pressure improved, within normal range.  Metoprolol ordered.    1632.  The patient has a pulse of 110-120s.  Blood pressure about 110 systolic.  She is been giving her oral metoprolol now.  She is clearly responsive to the beta-blocker.  The dosing of her oral medication is not yet known.  I think it would be in her best interest to stay in the hospital and clarify this dosing and clarify that she is able to get up and move around well and care for self.  However, the patient would like to go home.  She has demonstrated that she can get up with her walker now and get to the restroom on her own.  She would like to return to her home where her daughter will help take care of her.  I did describe how to adjust her metoprolol if her pulse is consistently above 110, even with activity.  Her sons are present in the room.  They agree with their mom's wishes.    EKG  (1137)   Interpretation performed by me.  Rate: 144     Rhythm: AF     Axis: nl  Intervals: IN (12-2) -, QRS (<12) 92, QTc (>5) 443  P wave: -     QRS complex: nl   ST segment / T-wave: ST-deprssion V4-6  Conclusion: Atrial fibrillation with rapid rate, ST depression in leads V4-V6.    Electronic medical chart reviewed, including medical problems, medications, medical allergies, social history.  Recent hospitalizations and surgical procedures reviewed.  Recent clinic visits and consultations reviewed.  Recent labs and test results reviewed.  Nursing notes reviewed.    The patient, their parent if applicable, and/or their  medical decision maker(s) and I have reviewed all of the available historical information, applicable PMH, physical exam findings, and objective diagnostic data gathered during this ED visit.  We then discussed all work-up options and then together agreed upon the course taken during this visit.  The ultimate disposition and plan was a cooperative decision made between myself and the patient, their parent if applicable, and/or their legal decision maker(s).  The risks and benefits of all decisions made during this visit were discussed to the best of my abilities given the circumstances, and all parties are understanding of the pertinent ramifications of these decisions.      LABS  Labs Ordered and Resulted from Time of ED Arrival to Time of ED Departure   CRP INFLAMMATION - Abnormal       Result Value    CRP Inflammation 6.52 (*)    ROUTINE UA WITH MICROSCOPIC REFLEX TO CULTURE - Abnormal    Color Urine Yellow      Appearance Urine Clear      Glucose Urine Negative      Bilirubin Urine Negative      Ketones Urine 20 (*)     Specific Gravity Urine 1.017      Blood Urine Negative      pH Urine 7.0      Protein Albumin Urine Negative      Urobilinogen Urine 2.0      Nitrite Urine Negative      Leukocyte Esterase Urine Negative      RBC Urine 4 (*)     WBC Urine 3      Squamous Epithelials Urine 1     BASIC METABOLIC PANEL - Normal    Sodium 142      Potassium 4.0      Chloride 106      Carbon Dioxide (CO2) 25      Anion Gap 11      Urea Nitrogen 11.4      Creatinine 0.72      GFR Estimate 82      Calcium 8.9      Glucose 95     INFLUENZA A/B, RSV, & SARS-COV2 PCR - Normal    Influenza A PCR Negative      Influenza B PCR Negative      RSV PCR Negative      SARS CoV2 PCR Negative     ERYTHROCYTE SEDIMENTATION RATE AUTO - Normal    Erythrocyte Sedimentation Rate 9     CBC WITH PLATELETS AND DIFFERENTIAL    WBC Count 5.5      RBC Count 4.59      Hemoglobin 13.4      Hematocrit 41.2      MCV 90      MCH 29.2      MCHC 32.5       RDW 12.8      Platelet Count 196      % Neutrophils 74      % Lymphocytes 15      % Monocytes 9      % Eosinophils 1      % Basophils 1      % Immature Granulocytes 0      NRBCs per 100 WBC 0      Absolute Neutrophils 4.1      Absolute Lymphocytes 0.8      Absolute Monocytes 0.5      Absolute Eosinophils 0.0      Absolute Basophils 0.0      Absolute Immature Granulocytes 0.0      Absolute NRBCs 0.0         IMAGING  Images reviewed by me.  Radiology report also reviewed.  CT Head w/o Contrast   Final Result   IMPRESSION:    1. No acute intracranial pathology.    2. Advanced diffuse cerebral volume loss and chronic small vessel   ischemic disease.      ALBERTO PICKARD MD            SYSTEM ID:  [id number]      XR Chest 2 Views   Final Result   IMPRESSION: Lungs clear. No pleural effusions. Heart size and   pulmonary vascularity are within normal limits. Postoperative changes   right breast.      BRITTANY KILLIAN MD            SYSTEM ID:  [id number]          Procedures    Medications   sodium chloride 0.9% BOLUS 1,000 mL (0 mLs Intravenous Stopped 2/21/24 1320)   metoprolol (LOPRESSOR) injection 5 mg (5 mg Intravenous $Given 2/21/24 1531)   metoprolol tartrate (LOPRESSOR) tablet 25 mg (25 mg Oral $Given 2/21/24 1623)         IMPRESSION       ICD-10-CM    1. Atrial fibrillation with rapid ventricular response (H)  I48.91 Primary Care Referral               Medication List        Started      metoprolol tartrate 25 MG tablet  Commonly known as: LOPRESSOR  25 mg, Oral, 2 TIMES DAILY                            Rodrick Castillo MD  02/21/24 1634       Rodrick Castillo MD  02/27/24 1415

## 2024-02-21 NOTE — ED TRIAGE NOTES
Pt felt weak this morning and couldn't get out of bed. She slid down to the floor and hit her right side on the nightstand. Did not hit head, not on blood thinners. EMS found her HR to be in the 150s     Triage Assessment (Adult)       Row Name 02/21/24 1125          Triage Assessment    Airway WDL WDL        Respiratory WDL    Respiratory WDL WDL        Skin Circulation/Temperature WDL    Skin Circulation/Temperature WDL WDL        Cardiac WDL    Cardiac WDL X        Peripheral/Neurovascular WDL    Peripheral Neurovascular WDL WDL        Cognitive/Neuro/Behavioral WDL    Cognitive/Neuro/Behavioral WDL WDL

## 2024-05-02 ENCOUNTER — OFFICE VISIT (OUTPATIENT)
Dept: FAMILY MEDICINE | Facility: CLINIC | Age: 84
End: 2024-05-02
Payer: COMMERCIAL

## 2024-05-02 VITALS
SYSTOLIC BLOOD PRESSURE: 120 MMHG | WEIGHT: 117 LBS | RESPIRATION RATE: 14 BRPM | HEIGHT: 64 IN | BODY MASS INDEX: 19.97 KG/M2 | HEART RATE: 62 BPM | DIASTOLIC BLOOD PRESSURE: 84 MMHG | OXYGEN SATURATION: 93 %

## 2024-05-02 DIAGNOSIS — M47.26 OSTEOARTHRITIS OF SPINE WITH RADICULOPATHY, LUMBAR REGION: ICD-10-CM

## 2024-05-02 DIAGNOSIS — R41.9 NEUROCOGNITIVE DISORDER: ICD-10-CM

## 2024-05-02 DIAGNOSIS — R26.89 SHUFFLING GAIT: Primary | ICD-10-CM

## 2024-05-02 PROCEDURE — 99214 OFFICE O/P EST MOD 30 MIN: CPT | Performed by: STUDENT IN AN ORGANIZED HEALTH CARE EDUCATION/TRAINING PROGRAM

## 2024-05-02 RX ORDER — CARBIDOPA AND LEVODOPA 25; 100 MG/1; MG/1
TABLET ORAL
Qty: 32 TABLET | Refills: 0 | Status: SHIPPED | OUTPATIENT
Start: 2024-05-02 | End: 2024-05-09

## 2024-05-02 ASSESSMENT — PAIN SCALES - GENERAL: PAINLEVEL: NO PAIN (0)

## 2024-05-02 NOTE — PROGRESS NOTES
Assessment & Plan     Patient is a pleasant 84-year-old female presents today for follow-up on shuffling gait, review of imaging studies.  Patient has had multiple falls in the past year, sounds like these are often due to feeling like her legs are giving out, particularly in the right leg.  She does also shuffle her feet when she walks.    Shuffling gait  From a shuffling perspective, she was previously noted to have some cogwheel rigidity on laboratory evaluation.  We discussed a trial of Sinemet to see if this actually improves the shuffling gait and her functional abilities.  If does help, consider Parkinson's diagnosis.  Will follow-up in 1 month with regards to this.  Due to recurrent falls that are likely in part due to shuffling gait and in part due to osteoarthritic changes in the back and related radiculopathy, Home care referral for physical therapy was placed today to hopefully help prevent further falls, improve coordination.  - carbidopa-levodopa (SINEMET)  MG tablet  Dispense: 32 tablet; Refill: 0  - Home Care Referral  - PRIMARY CARE FOLLOW-UP SCHEDULING    Osteoarthritis of spine with radiculopathy, lumbar region  History of osteoarthritis of the spine with radiculopathy, previously with left-sided surgical intervention in 2009.  More recently, having more right-sided symptoms with radiation of pain down from the low back into the back of the leg and down to the foot.  No significant piriformis tenderness today, suspect most of her neurologic issues are coming directly from the spine.  Last x-ray of the spine did show a wedge-shaped L5, and certainly have concerns that this is representative of a compression fracture.  For further evaluation of the nerves as they exit the spine as well as for possible compression fracture, an MRI of the spine is ordered today.  As noted above, home care referral is placed for physical therapy to help with ambulatory improvement  - Home Care Referral  -  "PRIMARY CARE FOLLOW-UP SCHEDULING  - MR LUMBAR SPINE W/O & W CONTRAST    Neurocognitive disorder  On CT scan completed during an emergency department visit in February, noted to have diffuse cerebral volume loss.  She did actually undergo neuropsych testing in September of this last year and noted to have neurocognitive disorder.  Was supposed to have 6-month follow-up, however she has canceled this appointment.        I spent a total of 35 minutes on the day of the visit.   Time spent by me doing chart review, history and exam, documentation and further activities per the note      Subjective   Gail is a 84 year old, presenting for the following health issues:  Back Pain, Extremity Weakness (Right leg weak and shaky x January 2024), and Results (CT scan 02/21/2024)        5/2/2024     1:46 PM   Additional Questions   Roomed by Hollie CHILDERS   Accompanied by sister and neighbor     History of Present Illness       Back Pain:  She presents for follow up of back pain. Patient's back pain is a recurring problem.  Location of back pain:  Right buttock  Description of back pain: dull ache  Back pain spreads: right thigh    Since patient first noticed back pain, pain is: gradually worsening  Does back pain interfere with her job:  Not applicable       Reason for visit:  Weakness in right leg   She is not taking prescribed medications regularly due to remembering to take.     Times when she can't get up or walk because of the leg.   Will fall down on the floor and the leg just gives out on her.   \"Slow walking now\"   Don't pick feet up enough.     Shuffles.     Usually falls because leg gives out on her.   Almost feels like someone is pushing her from the back.     Has had multipel falls  No head injury (recently)    Interested in home PT.     History of low back surgery:   Left L3 hemilaminectomy, left L3-4 medial facetectomy, left L4 foraminotomy.   Left L4 hemilaminectomy, left L4-5 medial facetectomy, left L5 " "foraminotomy.       Review of Systems  Constitutional, HEENT, cardiovascular, pulmonary, gi and gu systems are negative, except as otherwise noted.      Objective    /84 (BP Location: Right arm, Patient Position: Sitting, Cuff Size: Adult Small)   Pulse 62   Resp 14   Ht 1.613 m (5' 3.5\")   Wt 53.1 kg (117 lb)   SpO2 93%   BMI 20.40 kg/m    Body mass index is 20.4 kg/m .  Physical Exam  Constitutional:       Appearance: Normal appearance.   HENT:      Head: Normocephalic.   Eyes:      General: No scleral icterus.     Extraocular Movements: Extraocular movements intact.      Conjunctiva/sclera: Conjunctivae normal.   Cardiovascular:      Rate and Rhythm: Normal rate.   Pulmonary:      Effort: Pulmonary effort is normal.   Musculoskeletal:      Comments: Shuffling gait, cogwheel rigidity again noted in upper extremities. No spinous process tenderness. Spasm with tenderness of perispinous musculature. No significant piriformis tenderness.    Neurological:      General: No focal deficit present.      Mental Status: She is alert and oriented to person, place, and time.         Results from this visitNo results found for any visits on 05/02/24.          Signed Electronically by: Aisha Hull MD    "

## 2024-05-08 ENCOUNTER — TELEPHONE (OUTPATIENT)
Dept: FAMILY MEDICINE | Facility: CLINIC | Age: 84
End: 2024-05-08
Payer: COMMERCIAL

## 2024-05-08 NOTE — TELEPHONE ENCOUNTER
Emilia from [facility] calls looking for verbal orders for PT for one time a week for 4 weeks and then 2 times a month for one month.    She also wanted Dr Hull to know that Gail I not taking her Vitamin B12, Vitamin D, Viactive Chew, or Multivitamin. But is taking a Cinnamon supplement.     Thank you!    Nidhi Garcia RN

## 2024-05-09 ENCOUNTER — MYC REFILL (OUTPATIENT)
Dept: FAMILY MEDICINE | Facility: CLINIC | Age: 84
End: 2024-05-09
Payer: COMMERCIAL

## 2024-05-09 ENCOUNTER — MYC REFILL (OUTPATIENT)
Dept: NEUROLOGY | Facility: CLINIC | Age: 84
End: 2024-05-09
Payer: COMMERCIAL

## 2024-05-09 DIAGNOSIS — E53.8 VITAMIN B12 DEFICIENCY (NON ANEMIC): ICD-10-CM

## 2024-05-09 DIAGNOSIS — R26.89 SHUFFLING GAIT: ICD-10-CM

## 2024-05-10 RX ORDER — LANOLIN ALCOHOL/MO/W.PET/CERES
1000 CREAM (GRAM) TOPICAL DAILY
Qty: 90 TABLET | Refills: 0 | Status: SHIPPED | OUTPATIENT
Start: 2024-05-10 | End: 2024-08-12

## 2024-05-10 RX ORDER — CARBIDOPA AND LEVODOPA 25; 100 MG/1; MG/1
TABLET ORAL
Qty: 32 TABLET | Refills: 0 | Status: SHIPPED | OUTPATIENT
Start: 2024-05-10 | End: 2024-06-11

## 2024-05-10 NOTE — TELEPHONE ENCOUNTER
Pending Prescriptions:                       Disp   Refills    carbidopa-levodopa (SINEMET)  MG tab*32 tab*0        Sig: Take 0.5 tablets by mouth daily for 7 days, THEN 0.5           tablets 2 times daily for 7 days, THEN 0.5           tablets 3 times daily for 14 days.    Routing refill request to provider for review/approval because:  Needs updated dosage instructions; mail order pharmacy requesting refills.    Darlene Pickard RN  [facility]

## 2024-05-10 NOTE — TELEPHONE ENCOUNTER
Refill request for: cyanocobalamin (VITAMIN B-12) 1000 MCG tablet    Directions: Take 1 tablet (1,000 mcg) by mouth daily     LOV: 09/12/23  NOV: Due for follow up appt. [employer] message sent to pt to remind to schedule.  Request to send refill to alternate pharmacy.    90 day supply with 0 refills Medication T'd for review and signature    Sierra Lugo LPN on 5/10/2024 at 12:03 PM

## 2024-05-16 DIAGNOSIS — Z53.9 DIAGNOSIS NOT YET DEFINED: Primary | ICD-10-CM

## 2024-05-16 PROCEDURE — G0180 MD CERTIFICATION HHA PATIENT: HCPCS | Performed by: STUDENT IN AN ORGANIZED HEALTH CARE EDUCATION/TRAINING PROGRAM

## 2024-05-20 NOTE — TELEPHONE ENCOUNTER
Pharmacy asking for approval to increase to 100 tablets, Please call [facility] (MAIL SERVICE) [facility] at #  [phone] to discuss further.

## 2024-05-20 NOTE — TELEPHONE ENCOUNTER
Cannot approve #[phone] as patient has not scheduled a follow up appt  Anne Armstrong CMA on 5/20/2024 at 11:53 AM  [facility]

## 2024-05-21 ENCOUNTER — HOSPITAL ENCOUNTER (OUTPATIENT)
Dept: MRI IMAGING | Facility: CLINIC | Age: 84
Discharge: HOME | End: 2024-05-21
Attending: STUDENT IN AN ORGANIZED HEALTH CARE EDUCATION/TRAINING PROGRAM | Admitting: STUDENT IN AN ORGANIZED HEALTH CARE EDUCATION/TRAINING PROGRAM
Payer: COMMERCIAL

## 2024-05-21 DIAGNOSIS — M47.26 OSTEOARTHRITIS OF SPINE WITH RADICULOPATHY, LUMBAR REGION: ICD-10-CM

## 2024-05-21 PROCEDURE — 72158 MRI LUMBAR SPINE W/O & W/DYE: CPT

## 2024-05-21 PROCEDURE — 255N000002 HC RX 255 OP 636: Performed by: STUDENT IN AN ORGANIZED HEALTH CARE EDUCATION/TRAINING PROGRAM

## 2024-05-21 PROCEDURE — A9585 GADOBUTROL INJECTION: HCPCS | Performed by: STUDENT IN AN ORGANIZED HEALTH CARE EDUCATION/TRAINING PROGRAM

## 2024-05-21 RX ORDER — GADOBUTROL 604.72 MG/ML
5 INJECTION INTRAVENOUS ONCE
Status: COMPLETED | OUTPATIENT
Start: 2024-05-21 | End: 2024-05-21

## 2024-05-21 RX ADMIN — GADOBUTROL 5 ML: 604.72 INJECTION INTRAVENOUS at 14:31

## 2024-05-28 ENCOUNTER — PATIENT OUTREACH (OUTPATIENT)
Dept: CARE COORDINATION | Facility: CLINIC | Age: 84
End: 2024-05-28
Payer: COMMERCIAL

## 2024-05-31 ENCOUNTER — TELEPHONE (OUTPATIENT)
Dept: FAMILY MEDICINE | Facility: CLINIC | Age: 84
End: 2024-05-31
Payer: COMMERCIAL

## 2024-05-31 NOTE — TELEPHONE ENCOUNTER
Home Care is calling regarding an established patient with [facility].       Requesting orders from: Aisha Hull  Provider is following patient: Yes  Is this a 60-day recertification request?  No    Orders Requested    Physical Therapy-FYI for provider:    1) cancel PT visit this week. Pt does not want any more PT visits.  PT will discharge pt next week.    Elvira does not need a call back.    Information was gathered and will be sent to provider for review.  RN will contact Home Care with information after provider review.  Confirmed ok to leave a detailed message with call back.  Contact information confirmed and updated as needed.    Peg Helm RN

## 2024-06-11 ENCOUNTER — OFFICE VISIT (OUTPATIENT)
Dept: FAMILY MEDICINE | Facility: CLINIC | Age: 84
End: 2024-06-11
Attending: STUDENT IN AN ORGANIZED HEALTH CARE EDUCATION/TRAINING PROGRAM
Payer: COMMERCIAL

## 2024-06-11 ENCOUNTER — PATIENT OUTREACH (OUTPATIENT)
Dept: CARE COORDINATION | Facility: CLINIC | Age: 84
End: 2024-06-11

## 2024-06-11 VITALS
OXYGEN SATURATION: 96 % | BODY MASS INDEX: 20.14 KG/M2 | WEIGHT: 118 LBS | HEIGHT: 64 IN | HEART RATE: 108 BPM | RESPIRATION RATE: 18 BRPM | SYSTOLIC BLOOD PRESSURE: 122 MMHG | DIASTOLIC BLOOD PRESSURE: 66 MMHG

## 2024-06-11 DIAGNOSIS — Z29.11 NEED FOR VACCINATION AGAINST RESPIRATORY SYNCYTIAL VIRUS: ICD-10-CM

## 2024-06-11 DIAGNOSIS — R26.89 SHUFFLING GAIT: ICD-10-CM

## 2024-06-11 DIAGNOSIS — M47.26 OSTEOARTHRITIS OF SPINE WITH RADICULOPATHY, LUMBAR REGION: ICD-10-CM

## 2024-06-11 DIAGNOSIS — Z23 NEED FOR TDAP VACCINATION: ICD-10-CM

## 2024-06-11 DIAGNOSIS — Z23 NEED FOR SHINGLES VACCINE: ICD-10-CM

## 2024-06-11 PROCEDURE — 99214 OFFICE O/P EST MOD 30 MIN: CPT | Performed by: STUDENT IN AN ORGANIZED HEALTH CARE EDUCATION/TRAINING PROGRAM

## 2024-06-11 PROCEDURE — G2211 COMPLEX E/M VISIT ADD ON: HCPCS | Performed by: STUDENT IN AN ORGANIZED HEALTH CARE EDUCATION/TRAINING PROGRAM

## 2024-06-11 RX ORDER — CARBIDOPA AND LEVODOPA 25; 100 MG/1; MG/1
1 TABLET ORAL 2 TIMES DAILY
Qty: 180 TABLET | Refills: 3 | Status: SHIPPED | OUTPATIENT
Start: 2024-06-11

## 2024-06-11 ASSESSMENT — PAIN SCALES - GENERAL: PAINLEVEL: NO PAIN (0)

## 2024-06-11 NOTE — PROGRESS NOTES
Assessment & Plan     Shuffling gait  Patient is a very pleasant 84-year-old female presents today for follow-up from starting carbidopa levodopa.  She has had shuffling gait, unsteadiness on her feet.  After starting it, she has been noticing some improvement in balance.  Also worked briefly with physical therapy at home, declines further services with them.  She does have difficulty with taking medications 3 times a day, as well as cutting them in half.  We opted to go to 1 full tablet twice daily, monitor at home independently.  If she feels she is improved, but not as good as she think she may get, encouraged her to contact me and we will increase the dosage likely going to 3 times daily 1 tablet versus increasing the milligrams per dose.  - PRIMARY CARE FOLLOW-UP SCHEDULING  - carbidopa-levodopa (SINEMET)  MG tablet; Take 1 tablet by mouth 2 times daily    Osteoarthritis of spine with radiculopathy, lumbar region  With regards to osteoarthritis of the spine, MRI of the spine was largely reassuring.  Continue to remain active is much as she is able to.  She seems to be functioning well enough, we did not discuss further medication options for this.  - PRIMARY CARE FOLLOW-UP SCHEDULING    The longitudinal plan of care for the diagnosis(es)/condition(s) as documented were addressed during this visit. Due to the added complexity in care, I will continue to support Gail in the subsequent management and with ongoing continuity of care.  Subjective   Gail is a 84 year old, presenting for the following health issues:  Recheck Medication        6/11/2024    10:07 AM   Additional Questions   Roomed by Hollie CHILDERS   Accompanied by Self     HPI     Medication Followup of Carbidopa-levodopa  Taking Medication as prescribed: yes  Side Effects:  None  Medication Helping Symptoms:  yes  More steady on her feet.     No side effects.     Also did some short term PT at home, does think it was helpful.         Review  "of Systems  Constitutional, HEENT, cardiovascular, pulmonary, gi and gu systems are negative, except as otherwise noted.      Objective    /66 (BP Location: Right arm, Patient Position: Sitting, Cuff Size: Adult Regular)   Pulse 108   Resp 18   Ht 1.613 m (5' 3.5\")   Wt 53.5 kg (118 lb)   SpO2 96%   BMI 20.57 kg/m    Body mass index is 20.57 kg/m .  Physical Exam  Constitutional:       Appearance: Normal appearance.   HENT:      Head: Normocephalic.   Eyes:      General: No scleral icterus.     Extraocular Movements: Extraocular movements intact.      Conjunctiva/sclera: Conjunctivae normal.   Cardiovascular:      Rate and Rhythm: Normal rate.   Pulmonary:      Effort: Pulmonary effort is normal.   Musculoskeletal:      Comments: Takes short steps, does lift feet better now compared to previously   Neurological:      General: No focal deficit present.      Mental Status: She is alert and oriented to person, place, and time.                  Signed Electronically by: Aisha Hull MD    "

## 2024-08-12 ENCOUNTER — APPOINTMENT (OUTPATIENT)
Dept: MRI IMAGING | Facility: CLINIC | Age: 84
End: 2024-08-12
Attending: EMERGENCY MEDICINE
Payer: COMMERCIAL

## 2024-08-12 ENCOUNTER — APPOINTMENT (OUTPATIENT)
Dept: CT IMAGING | Facility: CLINIC | Age: 84
End: 2024-08-12
Attending: EMERGENCY MEDICINE
Payer: COMMERCIAL

## 2024-08-12 ENCOUNTER — HOSPITAL ENCOUNTER (OUTPATIENT)
Facility: CLINIC | Age: 84
Setting detail: OBSERVATION
Discharge: HOME HEALTH | End: 2024-08-13
Attending: EMERGENCY MEDICINE | Admitting: EMERGENCY MEDICINE
Payer: COMMERCIAL

## 2024-08-12 DIAGNOSIS — R93.5 ABNORMAL CT SCAN, PELVIS: ICD-10-CM

## 2024-08-12 DIAGNOSIS — I48.91 RAPID ATRIAL FIBRILLATION (H): ICD-10-CM

## 2024-08-12 DIAGNOSIS — I63.411 CEREBROVASCULAR ACCIDENT (CVA) DUE TO EMBOLISM OF RIGHT MIDDLE CEREBRAL ARTERY (H): Primary | ICD-10-CM

## 2024-08-12 DIAGNOSIS — R93.0 ABNORMAL HEAD CT: ICD-10-CM

## 2024-08-12 PROBLEM — S32.10XA CLOSED FRACTURE OF SACRUM (H): Status: ACTIVE | Noted: 2024-08-12

## 2024-08-12 PROBLEM — I63.9 CEREBROVASCULAR ACCIDENT (H): Status: ACTIVE | Noted: 2024-08-12
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
## 2024-08-12 LAB
ALBUMIN SERPL BCG-MCNC: 3.8 G/DL (ref 3.5–5.2)
ALP SERPL-CCNC: 145 U/L (ref 40–150)
ALT SERPL W P-5'-P-CCNC: 6 U/L (ref 0–50)
ANION GAP SERPL CALCULATED.3IONS-SCNC: 9 MMOL/L (ref 7–15)
AST SERPL W P-5'-P-CCNC: 23 U/L (ref 0–45)
BASOPHILS # BLD AUTO: 0 10E3/UL (ref 0–0.2)
BASOPHILS NFR BLD AUTO: 1 %
BILIRUB DIRECT SERPL-MCNC: <0.2 MG/DL (ref 0–0.3)
BILIRUB SERPL-MCNC: 0.5 MG/DL
BUN SERPL-MCNC: 14.7 MG/DL (ref 8–23)
CALCIUM SERPL-MCNC: 8.9 MG/DL (ref 8.8–10.4)
CHLORIDE SERPL-SCNC: 108 MMOL/L (ref 98–107)
CHOLEST SERPL-MCNC: 143 MG/DL
CREAT SERPL-MCNC: 0.54 MG/DL (ref 0.51–0.95)
CRP SERPL-MCNC: 7.61 MG/L
EGFRCR SERPLBLD CKD-EPI 2021: 90 ML/MIN/1.73M2
EOSINOPHIL # BLD AUTO: 0.1 10E3/UL (ref 0–0.7)
EOSINOPHIL NFR BLD AUTO: 2 %
ERYTHROCYTE [DISTWIDTH] IN BLOOD BY AUTOMATED COUNT: 13.1 % (ref 10–15)
GLUCOSE BLDC GLUCOMTR-MCNC: 88 MG/DL (ref 70–99)
GLUCOSE SERPL-MCNC: 115 MG/DL (ref 70–99)
HBA1C MFR BLD: 5.8 %
HCO3 SERPL-SCNC: 25 MMOL/L (ref 22–29)
HCT VFR BLD AUTO: 39 % (ref 35–47)
HDLC SERPL-MCNC: 44 MG/DL
HGB BLD-MCNC: 12.6 G/DL (ref 11.7–15.7)
HOLD SPECIMEN: NORMAL
HOLD SPECIMEN: NORMAL
IMM GRANULOCYTES # BLD: 0 10E3/UL
IMM GRANULOCYTES NFR BLD: 0 %
INR PPP: 1.21 (ref 0.85–1.15)
LDLC SERPL CALC-MCNC: 75 MG/DL
LYMPHOCYTES # BLD AUTO: 1.1 10E3/UL (ref 0.8–5.3)
LYMPHOCYTES NFR BLD AUTO: 19 %
MAGNESIUM SERPL-MCNC: 2 MG/DL (ref 1.7–2.3)
MCH RBC QN AUTO: 28.8 PG (ref 26.5–33)
MCHC RBC AUTO-ENTMCNC: 32.3 G/DL (ref 31.5–36.5)
MCV RBC AUTO: 89 FL (ref 78–100)
MONOCYTES # BLD AUTO: 0.6 10E3/UL (ref 0–1.3)
MONOCYTES NFR BLD AUTO: 10 %
NEUTROPHILS # BLD AUTO: 3.9 10E3/UL (ref 1.6–8.3)
NEUTROPHILS NFR BLD AUTO: 69 %
NONHDLC SERPL-MCNC: 99 MG/DL
NRBC # BLD AUTO: 0 10E3/UL
NRBC BLD AUTO-RTO: 0 /100
NT-PROBNP SERPL-MCNC: 2789 PG/ML (ref 0–1800)
PLATELET # BLD AUTO: 293 10E3/UL (ref 150–450)
POTASSIUM SERPL-SCNC: 3.8 MMOL/L (ref 3.4–5.3)
PROT SERPL-MCNC: 6.3 G/DL (ref 6.4–8.3)
RBC # BLD AUTO: 4.38 10E6/UL (ref 3.8–5.2)
SODIUM SERPL-SCNC: 142 MMOL/L (ref 135–145)
TRIGL SERPL-MCNC: 120 MG/DL
TROPONIN T SERPL HS-MCNC: 8 NG/L
TSH SERPL DL<=0.005 MIU/L-ACNC: 1.33 UIU/ML (ref 0.3–4.2)
WBC # BLD AUTO: 5.7 10E3/UL (ref 4–11)

## 2024-08-12 PROCEDURE — 258N000003 HC RX IP 258 OP 636: Performed by: EMERGENCY MEDICINE

## 2024-08-12 PROCEDURE — 70553 MRI BRAIN STEM W/O & W/DYE: CPT

## 2024-08-12 PROCEDURE — 258N000003 HC RX IP 258 OP 636

## 2024-08-12 PROCEDURE — 93010 ELECTROCARDIOGRAM REPORT: CPT | Performed by: EMERGENCY MEDICINE

## 2024-08-12 PROCEDURE — 99291 CRITICAL CARE FIRST HOUR: CPT | Mod: 25

## 2024-08-12 PROCEDURE — 99207 PR NO BILLABLE SERVICE THIS VISIT: CPT | Performed by: STUDENT IN AN ORGANIZED HEALTH CARE EDUCATION/TRAINING PROGRAM

## 2024-08-12 PROCEDURE — 85004 AUTOMATED DIFF WBC COUNT: CPT | Performed by: EMERGENCY MEDICINE

## 2024-08-12 PROCEDURE — 83735 ASSAY OF MAGNESIUM: CPT

## 2024-08-12 PROCEDURE — 99223 1ST HOSP IP/OBS HIGH 75: CPT

## 2024-08-12 PROCEDURE — 80061 LIPID PANEL: CPT

## 2024-08-12 PROCEDURE — 36415 COLL VENOUS BLD VENIPUNCTURE: CPT | Performed by: EMERGENCY MEDICINE

## 2024-08-12 PROCEDURE — 83880 ASSAY OF NATRIURETIC PEPTIDE: CPT | Performed by: EMERGENCY MEDICINE

## 2024-08-12 PROCEDURE — 99291 CRITICAL CARE FIRST HOUR: CPT | Mod: 25 | Performed by: EMERGENCY MEDICINE

## 2024-08-12 PROCEDURE — 86140 C-REACTIVE PROTEIN: CPT | Performed by: EMERGENCY MEDICINE

## 2024-08-12 PROCEDURE — 85610 PROTHROMBIN TIME: CPT

## 2024-08-12 PROCEDURE — 84484 ASSAY OF TROPONIN QUANT: CPT

## 2024-08-12 PROCEDURE — 72192 CT PELVIS W/O DYE: CPT

## 2024-08-12 PROCEDURE — 80048 BASIC METABOLIC PNL TOTAL CA: CPT | Performed by: EMERGENCY MEDICINE

## 2024-08-12 PROCEDURE — 93005 ELECTROCARDIOGRAM TRACING: CPT

## 2024-08-12 PROCEDURE — 84443 ASSAY THYROID STIM HORMONE: CPT

## 2024-08-12 PROCEDURE — 51798 US URINE CAPACITY MEASURE: CPT

## 2024-08-12 PROCEDURE — 70544 MR ANGIOGRAPHY HEAD W/O DYE: CPT

## 2024-08-12 PROCEDURE — 70549 MR ANGIOGRAPH NECK W/O&W/DYE: CPT

## 2024-08-12 PROCEDURE — 83036 HEMOGLOBIN GLYCOSYLATED A1C: CPT

## 2024-08-12 PROCEDURE — 96365 THER/PROPH/DIAG IV INF INIT: CPT

## 2024-08-12 PROCEDURE — A9585 GADOBUTROL INJECTION: HCPCS | Performed by: EMERGENCY MEDICINE

## 2024-08-12 PROCEDURE — 96374 THER/PROPH/DIAG INJ IV PUSH: CPT | Mod: 59

## 2024-08-12 PROCEDURE — 96361 HYDRATE IV INFUSION ADD-ON: CPT

## 2024-08-12 PROCEDURE — 120N000001 HC R&B MED SURG/OB

## 2024-08-12 PROCEDURE — 82040 ASSAY OF SERUM ALBUMIN: CPT

## 2024-08-12 PROCEDURE — 250N000013 HC RX MED GY IP 250 OP 250 PS 637

## 2024-08-12 PROCEDURE — 70450 CT HEAD/BRAIN W/O DYE: CPT

## 2024-08-12 PROCEDURE — 250N000011 HC RX IP 250 OP 636: Performed by: EMERGENCY MEDICINE

## 2024-08-12 PROCEDURE — 96376 TX/PRO/DX INJ SAME DRUG ADON: CPT | Mod: 59

## 2024-08-12 PROCEDURE — 255N000002 HC RX 255 OP 636: Performed by: EMERGENCY MEDICINE

## 2024-08-12 RX ORDER — ENOXAPARIN SODIUM 100 MG/ML
40 INJECTION SUBCUTANEOUS EVERY 24 HOURS
Status: DISCONTINUED | OUTPATIENT
Start: 2024-08-12 | End: 2024-08-12

## 2024-08-12 RX ORDER — SODIUM CHLORIDE 9 MG/ML
1000 INJECTION, SOLUTION INTRAVENOUS CONTINUOUS
Status: DISCONTINUED | OUTPATIENT
Start: 2024-08-12 | End: 2024-08-13 | Stop reason: HOSPADM

## 2024-08-12 RX ORDER — CALCIUM CARBONATE 500 MG/1
1000 TABLET, CHEWABLE ORAL 4 TIMES DAILY PRN
Status: DISCONTINUED | OUTPATIENT
Start: 2024-08-12 | End: 2024-08-12

## 2024-08-12 RX ORDER — AMOXICILLIN 250 MG
2 CAPSULE ORAL 2 TIMES DAILY PRN
Status: DISCONTINUED | OUTPATIENT
Start: 2024-08-12 | End: 2024-08-13 | Stop reason: HOSPADM

## 2024-08-12 RX ORDER — NALOXONE HYDROCHLORIDE 0.4 MG/ML
0.4 INJECTION, SOLUTION INTRAMUSCULAR; INTRAVENOUS; SUBCUTANEOUS
Status: DISCONTINUED | OUTPATIENT
Start: 2024-08-12 | End: 2024-08-13 | Stop reason: HOSPADM

## 2024-08-12 RX ORDER — OXYCODONE HYDROCHLORIDE 5 MG/1
5 TABLET ORAL EVERY 4 HOURS PRN
Status: DISCONTINUED | OUTPATIENT
Start: 2024-08-12 | End: 2024-08-13 | Stop reason: HOSPADM

## 2024-08-12 RX ORDER — GADOBUTROL 604.72 MG/ML
10 INJECTION INTRAVENOUS ONCE
Status: COMPLETED | OUTPATIENT
Start: 2024-08-12 | End: 2024-08-12

## 2024-08-12 RX ORDER — ACETAMINOPHEN 500 MG
500 TABLET ORAL
COMMUNITY

## 2024-08-12 RX ORDER — SODIUM CHLORIDE 9 MG/ML
1000 INJECTION, SOLUTION INTRAVENOUS CONTINUOUS
Status: DISCONTINUED | OUTPATIENT
Start: 2024-08-12 | End: 2024-08-12

## 2024-08-12 RX ORDER — ACETAMINOPHEN 325 MG/1
975 TABLET ORAL EVERY 6 HOURS
Status: DISCONTINUED | OUTPATIENT
Start: 2024-08-12 | End: 2024-08-13 | Stop reason: HOSPADM

## 2024-08-12 RX ORDER — LIDOCAINE 40 MG/G
CREAM TOPICAL
Status: DISCONTINUED | OUTPATIENT
Start: 2024-08-12 | End: 2024-08-13 | Stop reason: HOSPADM

## 2024-08-12 RX ORDER — AMOXICILLIN 250 MG
1 CAPSULE ORAL 2 TIMES DAILY PRN
Status: DISCONTINUED | OUTPATIENT
Start: 2024-08-12 | End: 2024-08-13 | Stop reason: HOSPADM

## 2024-08-12 RX ORDER — ONDANSETRON 2 MG/ML
4 INJECTION INTRAMUSCULAR; INTRAVENOUS EVERY 6 HOURS PRN
Status: DISCONTINUED | OUTPATIENT
Start: 2024-08-12 | End: 2024-08-13 | Stop reason: HOSPADM

## 2024-08-12 RX ORDER — ACETAMINOPHEN 325 MG/1
650 TABLET ORAL EVERY 4 HOURS PRN
Status: DISCONTINUED | OUTPATIENT
Start: 2024-08-12 | End: 2024-08-12

## 2024-08-12 RX ORDER — ASPIRIN 81 MG/1
81 TABLET ORAL DAILY
Status: DISCONTINUED | OUTPATIENT
Start: 2024-08-13 | End: 2024-08-13 | Stop reason: HOSPADM

## 2024-08-12 RX ORDER — CALCIUM CARBONATE 500 MG/1
1000 TABLET, CHEWABLE ORAL 4 TIMES DAILY PRN
Status: DISCONTINUED | OUTPATIENT
Start: 2024-08-12 | End: 2024-08-13 | Stop reason: HOSPADM

## 2024-08-12 RX ORDER — IBUPROFEN 200 MG
200-300 TABLET ORAL
Status: ON HOLD | COMMUNITY
End: 2024-08-13

## 2024-08-12 RX ORDER — METOPROLOL TARTRATE 25 MG/1
25 TABLET, FILM COATED ORAL 2 TIMES DAILY
Status: DISCONTINUED | OUTPATIENT
Start: 2024-08-12 | End: 2024-08-13

## 2024-08-12 RX ORDER — CARBIDOPA AND LEVODOPA 25; 100 MG/1; MG/1
1 TABLET ORAL 2 TIMES DAILY
Status: DISCONTINUED | OUTPATIENT
Start: 2024-08-12 | End: 2024-08-13 | Stop reason: HOSPADM

## 2024-08-12 RX ORDER — NALOXONE HYDROCHLORIDE 0.4 MG/ML
0.2 INJECTION, SOLUTION INTRAMUSCULAR; INTRAVENOUS; SUBCUTANEOUS
Status: DISCONTINUED | OUTPATIENT
Start: 2024-08-12 | End: 2024-08-13 | Stop reason: HOSPADM

## 2024-08-12 RX ORDER — ONDANSETRON 4 MG/1
4 TABLET, ORALLY DISINTEGRATING ORAL EVERY 6 HOURS PRN
Status: DISCONTINUED | OUTPATIENT
Start: 2024-08-12 | End: 2024-08-12

## 2024-08-12 RX ORDER — LIDOCAINE 4 G/G
1 PATCH TOPICAL
Status: DISCONTINUED | OUTPATIENT
Start: 2024-08-12 | End: 2024-08-13 | Stop reason: HOSPADM

## 2024-08-12 RX ORDER — ONDANSETRON 2 MG/ML
4 INJECTION INTRAMUSCULAR; INTRAVENOUS EVERY 6 HOURS PRN
Status: DISCONTINUED | OUTPATIENT
Start: 2024-08-12 | End: 2024-08-12

## 2024-08-12 RX ORDER — ONDANSETRON 4 MG/1
4 TABLET, ORALLY DISINTEGRATING ORAL EVERY 6 HOURS PRN
Status: DISCONTINUED | OUTPATIENT
Start: 2024-08-12 | End: 2024-08-13 | Stop reason: HOSPADM

## 2024-08-12 RX ORDER — DILTIAZEM HYDROCHLORIDE 5 MG/ML
10 INJECTION INTRAVENOUS EVERY 30 MIN PRN
Status: DISCONTINUED | OUTPATIENT
Start: 2024-08-12 | End: 2024-08-13 | Stop reason: HOSPADM

## 2024-08-12 RX ADMIN — SODIUM CHLORIDE 50 ML: 9 INJECTION, SOLUTION INTRAVENOUS at 20:31

## 2024-08-12 RX ADMIN — METOPROLOL TARTRATE 25 MG: 25 TABLET, FILM COATED ORAL at 22:22

## 2024-08-12 RX ADMIN — SODIUM CHLORIDE 500 ML: 900 INJECTION, SOLUTION INTRAVENOUS at 17:00

## 2024-08-12 RX ADMIN — GADOBUTROL 10 ML: 604.72 INJECTION INTRAVENOUS at 20:30

## 2024-08-12 RX ADMIN — ACETAMINOPHEN 975 MG: 325 TABLET, FILM COATED ORAL at 22:22

## 2024-08-12 RX ADMIN — DILTIAZEM HYDROCHLORIDE 10 MG: 5 INJECTION, SOLUTION INTRAVENOUS at 17:01

## 2024-08-12 RX ADMIN — CARBIDOPA AND LEVODOPA 1 TABLET: 25; 100 TABLET ORAL at 22:22

## 2024-08-12 RX ADMIN — SODIUM CHLORIDE 1000 ML: 9 INJECTION, SOLUTION INTRAVENOUS at 22:27

## 2024-08-12 RX ADMIN — LIDOCAINE 1 PATCH: 4 PATCH TOPICAL at 22:20

## 2024-08-12 RX ADMIN — DILTIAZEM HYDROCHLORIDE 10 MG: 5 INJECTION, SOLUTION INTRAVENOUS at 18:28

## 2024-08-12 RX ADMIN — SODIUM CHLORIDE 1000 ML: 9 INJECTION, SOLUTION INTRAVENOUS at 19:55

## 2024-08-12 RX ADMIN — APIXABAN 2.5 MG: 2.5 TABLET, FILM COATED ORAL at 22:22

## 2024-08-12 ASSESSMENT — ACTIVITIES OF DAILY LIVING (ADL)
WALKING_OR_CLIMBING_STAIRS_DIFFICULTY: YES
DIFFICULTY_COMMUNICATING: NO
NUMBER_OF_TIMES_PATIENT_HAS_FALLEN_WITHIN_LAST_SIX_MONTHS: 2
CONCENTRATING,_REMEMBERING_OR_MAKING_DECISIONS_DIFFICULTY: NO
WEAR_GLASSES_OR_BLIND: OTHER (SEE COMMENTS)
ADLS_ACUITY_SCORE: 35
TOILETING_ISSUES: NO
CHANGE_IN_FUNCTIONAL_STATUS_SINCE_ONSET_OF_CURRENT_ILLNESS/INJURY: YES
HEARING_DIFFICULTY_OR_DEAF: NO
DIFFICULTY_EATING/SWALLOWING: NO
DOING_ERRANDS_INDEPENDENTLY_DIFFICULTY: YES
DRESSING/BATHING_DIFFICULTY: NO
ADLS_ACUITY_SCORE: 35
ADLS_ACUITY_SCORE: 35
ADLS_ACUITY_SCORE: 25
ADLS_ACUITY_SCORE: 24
FALL_HISTORY_WITHIN_LAST_SIX_MONTHS: YES
ADLS_ACUITY_SCORE: 35
ADLS_ACUITY_SCORE: 35

## 2024-08-12 ASSESSMENT — ENCOUNTER SYMPTOMS
PSYCHIATRIC NEGATIVE: 1
RESPIRATORY NEGATIVE: 1
MUSCULOSKELETAL NEGATIVE: 1
GASTROINTESTINAL NEGATIVE: 1
NEUROLOGICAL NEGATIVE: 1
EYES NEGATIVE: 1
ALLERGIC/IMMUNOLOGIC NEGATIVE: 1
ENDOCRINE NEGATIVE: 1
HEMATOLOGIC/LYMPHATIC NEGATIVE: 1
CARDIOVASCULAR NEGATIVE: 1

## 2024-08-12 NOTE — ED TRIAGE NOTES
Pt fell 3 weeks ago and has been weak ever since. Pt has history of going into a-fib and doesn't recall outcome. Pt is to ER today because her niece Keiry was informed by pt's sister (keirys mother) how pt was doing and Keiry told patient she needs to go to ER right away.      Triage Assessment (Adult)       Row Name 08/12/24 6919          Triage Assessment    Airway WDL WDL        Respiratory WDL    Respiratory WDL X  shortness of breath intermittent        Skin Circulation/Temperature WDL    Skin Circulation/Temperature WDL X;circulation     Skin Circulation cyanosis  lips        Cardiac WDL    Cardiac WDL WDL        Peripheral/Neurovascular WDL    Peripheral Neurovascular WDL WDL        Cognitive/Neuro/Behavioral WDL    Cognitive/Neuro/Behavioral WDL WDL

## 2024-08-12 NOTE — ED PROVIDER NOTES
History     Chief Complaint   Patient presents with    Generalized Weakness    Irregular Heart Beat     HPI  Gail Cuba is a 84 year old female who presents from urgent care for further assessment after reporting that she is had trouble using the bathroom and has had reduced mobility since a fall 2 weeks prior.  Patient reports that she is not typically wheelchair-bound but has had to use a wheelchair since her fall 2 weeks ago.    Reviewed the medical record.  I reviewed visit on 6/11/2024.  Patient has a history of osteoarthritis of the spine with radiculopathy.  She also has a diagnosis of hyperlipidemia, anxiety, recurrent depression and vitamin B12 deficiency.  Patient had MRI imaging of the lumbar spine on 5/21/2024 that revealed diffuse degenerative changes of her spine with no high-grade spinal canal stenosis or neuroforaminal narrowing of the lumbar spine.    On examination patient who arrived by car with her sister Sindi and son- Gilberto.  Patient lives independently in an apartment.  Reports that she fell out of bed 3 weeks ago onto carpeted [place]ona.  Carpet does cover statements.  She reports she fell between her bed and her computer desk.  Since her fall she has had decreased mobility due to right sacral pain.  She reports that she may have hit her head.  She reports that she is been able to sit on her shower chair to shower and that she has no numbness in her legs or thigh or feet.  She confirmed that she is on levothyroxine and carbidopa levodopa.  She reports no abdominal pain.  Son reports patient was treated for rapid atrial fibrillation in the past 6 months.  Records show patient was admitted at 2/21/24-and was found to be in rapid atrial fibrillation.  Patient was treated with oral metoprolol and IV metoprolol.  She was discharged home on metoprolol to take 25 mg twice a day spy reports she did not want to be admitted to the hospital.      Allergies:  Allergies   Allergen Reactions     Latex      Blistery rash from gloves    Nkda [No Known Drug Allergy]        Problem List:    Patient Active Problem List    Diagnosis Date Noted    Abnormal head CT 08/12/2024     Priority: Medium    Rapid atrial fibrillation (H) 08/12/2024     Priority: Medium    Abnormal CT scan, pelvis 08/12/2024     Priority: Medium    Vitamin B12 deficiency (non anemic) 01/13/2024     Priority: Medium    Recurrent major depressive disorder, in partial remission (H24) 01/20/2022     Priority: Medium    Osteoporosis 06/15/2015     Priority: Medium    Endometrial mass 12/20/2013     Priority: Medium     D and C,hysteroscopy, polypectomy  Stenotic os in clinic-cytotec 200mcg night prior      Anxiety 03/05/2013     Priority: Medium    Urge incontinence of urine 07/11/2011     Priority: Medium    Hyperlipidemia LDL goal <130 10/31/2010     Priority: Medium     Recent Labs   Lab Test 9/23/09 0908 6/29/07 0749    CHOL 194 247*    HDL 44* 57    LDL 99 142*    TRIG 252* 239*    CHOLHDLRATIO 4.0 4.0           Back pain 05/20/2010     Priority: Medium     4/17/2010 [facility] MRI IMPRESSION:     1. Postsurgical changes of a posterior decompression/left hemilaminotomy at L3-L4. No central stenosis. However, there is a small to moderate-sized central to left paracentral disc protrusion  with posterior displacement of the left descending L4 nerve root. 2. Foraminal stenosis greatest on the left at L3-L4 and bilaterally at L4-L5.  3. Multilevel degenerative disc disease greatest at L3-L4 and L4-L5 where there is adjacent discogenic endplate reactive marrow edema which appears to have progressed when compared to 5/18/2009.   4. Endometrial thickening, irregularity, and ill definition. Endometrial carcinoma cannot be excluded. There is also a probable anterior pedunculated uterine fibroid.       THICKENING ENDOMETRIUM 05/06/2010     Priority: Medium    Personal history of malignant neoplasm of breast 12/29/2008      Priority: Medium     Infiltrating ductal cancer  - T1 N0  2/19/09 - started XRT in 26 fractions, with boost with at end to cavity.          Past Medical History:    Past Medical History:   Diagnosis Date    Anxiety     Breast cancer (H) 2009    Breast mass     Chest pain     Hypertension     MEDICAL HISTORY OF - 6-08    Urge incontinence        Past Surgical History:    Past Surgical History:   Procedure Laterality Date    BIOPSY BREAST      BREAST LUMPECTOMY, RT/LT  1-26-09    Breast Lumpectomy RT    COLONOSCOPY  7/25/2013    Procedure: COLONOSCOPY;  Colonoscopy  ;  Surgeon: Deep Purcell MD;  Location: [facility]    D & C  5/24/10    for EMB     DILATION AND CURETTAGE, OPERATIVE HYSTEROSCOPY, COMBINED  1/14/2014    Procedure: COMBINED DILATION AND CURETTAGE, OPERATIVE HYSTEROSCOPY;  Hysteroscopy with dilation and curettage, possible polypectomy- choice anes  ;  Surgeon: Lauryn Hoffman MD;  Location: [facility]    HC EXCISION BREAST LESION, OPEN >=1  6-13-08    with sentinal node biopsy    LUMPECTOMY BREAST  2009    right breast    PHACOEMULSIFICATION WITH STANDARD INTRAOCULAR LENS IMPLANT Left 2/12/2020    Procedure: Cataract Removal with Implant;  Surgeon: Jose Cerrato MD;  Location: [facility]    PHACOEMULSIFICATION WITH STANDARD INTRAOCULAR LENS IMPLANT Right 3/4/2020    Procedure: Cataract Removal with Implant;  Surgeon: Jose Cerrato MD;  Location: [facility]    SURGICAL HISTORY OF -       Neck surgery.    SURGICAL HISTORY OF -   11/17/09    L4-5 back surgery       Family History:    Family History   Problem Relation Age of Onset    Alcohol/Drug Mother         alcohalic    Hypertension Mother     Cerebrovascular Disease Mother     Thyroid Disease Sister     Hyperlipidemia Sister     Depression Sister     Anxiety Disorder Sister     Osteoporosis Sister     Musculoskeletal Disorder Son     Hypertension Son     Diabetes Father     Prostate Cancer Father     Cancer - colorectal Maternal  Grandfather     Diabetes Brother     Depression Brother     Asthma No family hx of     C.A.D. No family hx of     Breast Cancer No family hx of     Anesthesia Reaction No family hx of     Blood Disease No family hx of     Ovarian Cancer No family hx of        Social History:  Marital Status:  Single [1]  Social History     Tobacco Use    Smoking status: Never    Smokeless tobacco: Never   Vaping Use    Vaping status: Never Used   Substance Use Topics    Alcohol use: Yes     Comment: Occasional    Drug use: No        Medications:    acetaminophen (TYLENOL) 500 MG tablet  ASPIRIN 81 MG OR TABS  carbidopa-levodopa (SINEMET)  MG tablet  escitalopram (LEXAPRO) 10 MG tablet  ibuprofen (ADVIL/MOTRIN) 200 MG tablet  Multiple Vitamins-Minerals (MULTIVITAMIN ADULTS PO)          Review of Systems   Constitutional:         Fell out of bed 3 weeks ago.   HENT: Negative.     Eyes: Negative.    Respiratory: Negative.     Cardiovascular: Negative.    Gastrointestinal: Negative.    Endocrine: Negative.    Genitourinary: Negative.    Musculoskeletal: Negative.         Lower pelvic and posterior buttock pain   Allergic/Immunologic: Negative.    Neurological: Negative.    Hematological: Negative.    Psychiatric/Behavioral: Negative.         Physical Exam   BP: (!) 140/131  Pulse: (!) 149  Temp: 97.8  F (36.6  C)  Resp: 20  Weight: 53.5 kg (118 lb)  SpO2: 97 %      Physical Exam  Constitutional:       Appearance: She is not toxic-appearing or diaphoretic.   HENT:      Head: Normocephalic and atraumatic.      Nose: Nose normal.   Eyes:      Extraocular Movements: Extraocular movements intact.      Pupils: Pupils are equal, round, and reactive to light.   Cardiovascular:      Rate and Rhythm: Tachycardia present. Rhythm irregular.   Pulmonary:      Effort: Pulmonary effort is normal.      Breath sounds: Normal breath sounds.   Musculoskeletal:         General: Tenderness present.      Cervical back: Normal range of motion and neck  supple.        Back:    Skin:     Capillary Refill: Capillary refill takes less than 2 seconds.      Coloration: Skin is not jaundiced.      Findings: No bruising, erythema, lesion or rash.   Neurological:      Mental Status: She is alert and oriented to person, place, and time.   Psychiatric:         Mood and Affect: Mood normal.         Behavior: Behavior normal.         ED Course        Procedures                 EKG Interpretation:      Interpreted by Yandel Arthur MD  Time reviewed:1658   Symptoms at time of EKG: None   Rhythm: Normal sinus  and Atrial fibrillation - rapid  Rate: 120-130  Axis: Normal  Ectopy: None  Conduction: Normal  ST Segments/ T Waves: Non-specific ST-T wave changes  Q Waves: Nonspecific  Comparison to prior: When compared with EKG dated 2/21/24-rapid atrial fibrillation is unchanged    Clinical Impression: Rapid atrial fibrillation    Critical Care time:  30 minutes.             ED medications:  Medications   sodium chloride 0.9 % infusion (1,000 mLs Intravenous $New Bag 8/12/24 1955)   diltiazem (CARDIZEM) injection 10 mg (10 mg Intravenous $Given 8/12/24 1828)   sodium chloride 0.9% BOLUS 500 mL (0 mLs Intravenous Stopped 8/1940)      ED Vitals:  Vitals:    08/12/24 1900 08/12/24 1915 08/12/24 1926 08/12/24 1931   BP:   (!) 125/99 (!) 119/92   Pulse: 85 74 67 80   Resp: 16 24     Temp:       TempSrc:       SpO2: 96% 93% 93% 93%   Weight:          Vitals:    08/12/24 1900 08/12/24 1915 08/12/24 1926 08/12/24 1931   BP:   (!) 125/99 (!) 119/92   Pulse: 85 74 67 80   Resp: 16 24     Temp:       TempSrc:       SpO2: 96% 93% 93% 93%   Weight:          Vitals:    08/12/24 1915 08/12/24 1926 08/12/24 1931 08/12/24 2000   BP:  (!) 125/99 (!) 119/92 (!) 146/110   Pulse: 74 67 80 92   Resp: 24   18   Temp:       TempSrc:       SpO2: 93% 93% 93% 98%   Weight:          ED labs and imaging:  Results for orders placed or performed during the hospital encounter of 08/12/24   CT Pelvis  Bone wo Contrast     Status: None    Narrative    EXAM: CT PELVIS BONE WO CONTRAST  LOCATION: [facility]  DATE: 8/12/2024    INDICATION: Unwitnessed fall out of bed  3 weeks prior. Right sacral pain. Evaluate for acute bony process after blunt trauma.  COMPARISON: None.  TECHNIQUE: CT scan of the pelvis was performed without IV contrast. Multiplanar reformats were obtained. Dose reduction techniques were used.  CONTRAST: None.    FINDINGS:    BONES AND JOINTS:  Acute comminuted fracture of the bilateral sacral ala at the level of S1 and S2 anteriorly with minimal displacement and impaction. There is fracture extension to the bilateral S1 neural foramina, and possible S2 neural foramina. There is normal joint   alignment. Mild bilateral hip and sacroiliac joint degenerative changes. Severe degenerative changes of the lower lumbar spine. Osteopenia.    SOFT TISSUES:  Mild presacral fat stranding. No discrete fluid collection. Diffuse muscle atrophy. Atherosclerosis. Colonic diverticulosis.      Impression    IMPRESSION:  1.  Acute comminuted sacral fracture with minimal displacement and neural foraminal extension.  2.  Other ancillary findings as described above.     Head CT w/o contrast     Status: None    Narrative    EXAM: CT HEAD W/O CONTRAST  LOCATION: [facility]  DATE: 8/12/2024    INDICATION: Unwitnessed fall out of bed 3 weeks prior. Advanced age. Evaluate for acute intracranial process.  COMPARISON: CT dated 2/21/2024   TECHNIQUE: Routine CT Head without IV contrast. Multiplanar reformats. Dose reduction techniques were used.    FINDINGS:  INTRACRANIAL CONTENTS: No acute intracranial hemorrhage, extra-axial fluid collection, or mass effect. Again noted subtle mineralization of the mediodorsal thalami. No evidence of an acute transcortical confluent infarct. No present on the prior exam, a   well-circumscribed hypodensity involving the right caudate head  and putamen with mild regional mass effect partially effacing the frontal horn of the right lateral ventricle. No interval ventriculomegaly or midline shift. Patent basal cisterns. Grossly   similar patchy and confluent bilateral cerebral white matter hypodensities, nonspecific although most likely the sequela of chronic microvascular ischemia. Moderate to advanced generalized cerebral and mild cerebellar parenchymal volume loss.     VISUALIZED ORBITS/SINUSES/MASTOIDS: No acute intraorbital finding. No evidence of significant paranasal sinus or mastoid mucosal disease.     BONES/SOFT TISSUES: No acute calvarial injury or significant scalp hematoma.      Impression    IMPRESSION:  1.  A nonhemorrhagic likely subacute right basal ganglia infarct with mild regional mass effect. No midline shift or hydrocephalus.  2.  No evidence of acute traumatic brain injury.  3.  Chronic age-related changes, as described.   Basic metabolic panel     Status: Abnormal   Result Value Ref Range    Sodium 142 135 - 145 mmol/L    Potassium 3.8 3.4 - 5.3 mmol/L    Chloride 108 (H) 98 - 107 mmol/L    Carbon Dioxide (CO2) 25 22 - 29 mmol/L    Anion Gap 9 7 - 15 mmol/L    Urea Nitrogen 14.7 8.0 - 23.0 mg/dL    Creatinine 0.54 0.51 - 0.95 mg/dL    GFR Estimate 90 >60 mL/min/1.73m2    Calcium 8.9 8.8 - 10.4 mg/dL    Glucose 115 (H) 70 - 99 mg/dL   CRP inflammation     Status: Abnormal   Result Value Ref Range    CRP Inflammation 7.61 (H) <5.00 mg/L   Nt probnp inpatient (BNP)     Status: Abnormal   Result Value Ref Range    N terminal Pro BNP Inpatient 2,789 (H) 0 - 1,800 pg/mL   CBC with platelets and differential     Status: None   Result Value Ref Range    WBC Count 5.7 4.0 - 11.0 10e3/uL    RBC Count 4.38 3.80 - 5.20 10e6/uL    Hemoglobin 12.6 11.7 - 15.7 g/dL    Hematocrit 39.0 35.0 - 47.0 %    MCV 89 78 - 100 fL    MCH 28.8 26.5 - 33.0 pg    MCHC 32.3 31.5 - 36.5 g/dL    RDW 13.1 10.0 - 15.0 %    Platelet Count 293 150 - 450 10e3/uL    %  Neutrophils 69 %    % Lymphocytes 19 %    % Monocytes 10 %    % Eosinophils 2 %    % Basophils 1 %    % Immature Granulocytes 0 %    NRBCs per 100 WBC 0 <1 /100    Absolute Neutrophils 3.9 1.6 - 8.3 10e3/uL    Absolute Lymphocytes 1.1 0.8 - 5.3 10e3/uL    Absolute Monocytes 0.6 0.0 - 1.3 10e3/uL    Absolute Eosinophils 0.1 0.0 - 0.7 10e3/uL    Absolute Basophils 0.0 0.0 - 0.2 10e3/uL    Absolute Immature Granulocytes 0.0 <=0.4 10e3/uL    Absolute NRBCs 0.0 10e3/uL   [place] Draw     Status: None    Narrative    The following orders were created for panel order [place] Draw.  Procedure                               Abnormality         Status                     ---------                               -----------         ------                     Extra Blue Top Tube[[id number]]                              Final result               Extra Red Top Tube[[id number]]                               Final result                 Please view results for these tests on the individual orders.   Extra Blue Top Tube     Status: None   Result Value Ref Range    Hold Specimen jic    Extra Red Top Tube     Status: None   Result Value Ref Range    Hold Specimen jic    Hemoglobin A1c     Status: Abnormal   Result Value Ref Range    Hemoglobin A1C 5.8 (H) <5.7 %   Lipid panel reflex to direct LDL     Status: Abnormal   Result Value Ref Range    Cholesterol 143 <200 mg/dL    Triglycerides 120 <150 mg/dL    Direct Measure HDL 44 (L) >=50 mg/dL    LDL Cholesterol Calculated 75 <=100 mg/dL    Non HDL Cholesterol 99 <130 mg/dL    Narrative    Cholesterol  Desirable:  <200 mg/dL    Triglycerides  Normal:  Less than 150 mg/dL  Borderline High:  150-199 mg/dL  High:  200-499 mg/dL  Very High:  Greater than or equal to 500 mg/dL    Direct Measure HDL  Female:  Greater than or equal to 50 mg/dL   Male:  Greater than or equal to 40 mg/dL    LDL Cholesterol  Desirable:  <100mg/dL  Above Desirable:  100-129 mg/dL   Borderline High:  130-159 mg/dL    High:  160-189 mg/dL   Very High:  >= 190 mg/dL    Non HDL Cholesterol  Desirable:  130 mg/dL  Above Desirable:  130-159 mg/dL  Borderline High:  160-189 mg/dL  High:  190-219 mg/dL  Very High:  Greater than or equal to 220 mg/dL   CBC with platelets differential     Status: None    Narrative    The following orders were created for panel order CBC with platelets differential.  Procedure                               Abnormality         Status                     ---------                               -----------         ------                     CBC with platelets and d...[[id number]]                      Final result                 Please view results for these tests on the individual orders.     Assessments & Plan (with Medical Decision Making)   Assessment Summary and clinical Impression: 84-year-old female who presented with concern that she has had poor mobility since her fall 3weeks prior. Patient was seen in urgent care and referred to the emergency department for further care.  Patient has a known history of lumbar osteoarthritis with radiculopathy and had MRI imaging on 5/21/24-which revealed diffuse degenerative changes lumbar spine with no high-grade stenosis or neuroforaminal narrowing.  Patient arrived reporting progressive right sacral pain.  She was captured to be in atrial fibrillation.  Records show patient was evaluated on 2/21/24 for rapid atrial fibrillation treated with IV and oral metoprolol and discharged home with oral metoprolol-25 mg twice a day. (Son reported patient did not want to be admitted for care during that assessment) Patient was captured to be in rapid atrial fibrillation with pulse in the 140s to 150s.      Given subacute fall out of bed in the last 3 weeks now with progressive sacral pain CT of the pelvis was obtained to assess for pelvic fracture.  CT revealed bilateral sacral alar fractures with extension into the S1 and S2 neural foramen.  Patient had no lower extremity  weakness or numbness and no concern for urinary retention. Patient arrived with asymptomatic rapid atrial fibrillation and was assessed for atrial fibrillation 6 months earlier.  Medical management of her rapid atrial fibrillation with IV diltiazem was undertaken with good rate control.  Patient is not on anticoagulation but on 81 mg aspirin.treated with metoprolol during prior care for atrial fibrillation 6 months prior.   At shift end patient was awaiting transfer to the medical floor for further care for subacute sacral fractures after blunt trauma with ground-level fall out of bed 3 weeks prior and asymptomatic rapid atrial fibrillation- now rate controlled with IV diltiazem.     ED Course and plan:  Reviewed the MR medical record. MR Lumbar spine on 5/21/24.office visit on 6/11/2024.  She was given IV diltiazem for rate control given rapid atrial fibrillation.  After 1 dose of IV diltiazem patient's rate was better controlled but she remained in atrial fibrillation.  BNP is 2789, CRP 7.6.  Normal electrolytes.  Glucose 115  CT pelvis revealed an acute comminuted sacral fracture with minimal displacement and neuroforaminal extension.  Radiology noted that the fracture bilateral sacral ala at the level of S1 and S2 anteriorly with minimal displacement and impaction the fracture did extend to involve the S1 neural foramina and neural foraminal. See further details outlined radiology report.   Spoke with orthopedic surgery on-call [facility] given subacute presentation fall reported 3 weeks prior to ensure that no surgical intervention will be required. Spoke with Dr. PATRICE Fortune at 6.38pm we reviewed the role of lumbar and sacral imaging given extension into the S1 and S2 neuroforaminal.  We agreed that given the fall was 3 weeks prior and there was no lower extremity numbness weakness or concern for spinal cord involvement low utility of obtaining a lumbar spine or sacral spine.  No formal consultation planned given  subacute fall with accompanying injury now appreciated on imaging today.    Head CT revealed a nonhemorrhagic likely subacute right basal ganglia infarct with regional mass effect no midline shift or hydronephrosis. See details in radiology report.    Spoke with the admitting provider- MONTSERRAT Zamora PA-C at 7pm who was willing to except patient for further care with rapid atrial fibrillation rate controlled with intravenous diltiazem however we agreed to follow-up with stroke neurology given subacute stroke findings on head CT.    Spoke with stroke neurology service- Dr. Soni at 7.28pm who advised MRI and MRA.  We reviewed rationale for head imaging (obtained with patient reporting she may have hit her head). Reviewed patient is on 81 mg aspirin and being admitted for subacute bilateral sacral fractures as a result of her fall.  We discussed recent assessment for rapid atrial fibrillation 6 months prior that patient was not on anticoagulation. Patient is currently in atrial fibrillation.  Plan is to provide additional recommendations after completion of MRI/MRA. See stroke neurology consult note for further details.    Patient voided during her ED course of care.  Bladder scan was 75mL. A pure wick was placed to help quantify patient's urine output, although clinical suspicion for spinal cord involvement with sacral neural foramina involvement is low appreciated on pelvis CT.    At shift end at 9pm patient was awaiting completion of MRI and MRA imaging with plan to admit to medicine floor for further care for atrial fibrillation and subacute sacral fractures.  Updated Dr. LAYO Montalvo in the event of a clinical status change.      Disclaimer: This note consists of symbols derived from keyboarding, dictation and/or voice recognition software. As a result, there may be errors in the script that have gone undetected. Please consider this when interpreting information found in this chart.   I have reviewed the nursing  notes.    I have reviewed the findings, diagnosis, plan and need for follow up with the patient.           Medical Decision Making  The patient's presentation was of high complexity (rapid atrial fibrillation asymptomatic, blunt trauma with subacute fall 3 weeks prior unwitnessed out of bed with right sacral pain hospitalization).    The patient's evaluation involved:  ordering and/or review of 2 test(s) in this encounter (diagnostic imaging and labs)    The patient's management necessitated high risk (hospitalization for pain management, rate control).        New Prescriptions    No medications on file       Final diagnoses:   Rapid atrial fibrillation (H) - asymptomatic. Prior care for rapid atrial fibrillation- 2/21/24. Not anticogulated. Previously on metoprolol   Abnormal CT scan, pelvis - IMPRESSION: 1.  Acute comminuted sacral fracture with minimal displacement and neural foraminal extension.   Abnormal head CT - IMPRESSION: 1.  A nonhemorrhagic likely subacute right basal ganglia infarct with mild regional mass effect. No midline shift or hydrocephalus       8/12/2024   [facility] EMERGENCY DEPT       Yandel Arthur MD  08/12/24 2046

## 2024-08-12 NOTE — ED NOTES
CT complete, pt tolerated well.  Pt remains in AF, rate controlled.  Will continue to monitor and await results.

## 2024-08-12 NOTE — ED NOTES
"   Pt arrived via triage, accomp by family  Pt state she fell 3 weeks ago, did not tell family, and feels week and has pain in her coccyx area.  Pt is able to care for herself she states.  Pt noted to be in a-fib with RVR, she started she is not always in it, is not medicated for it, and was here recently for it, but elected to go home.  Pt states her PCP took her off her BP medication recently.  She does not feel her heart race, and has no idea when she went into it.  Pt denies CP or SOB,  \"just week\".      12 lead completed on arrival.  Pt placed on full monitors.  MD in room for assessment and exam.  20g IV started fluids hung per orders and first dose of Cardizem IV given.    Plan:  Will monitor closely and medicate per orders.  Plan to admit, pt and family aware and pt agrees at this time.        "

## 2024-08-12 NOTE — ED NOTES
Pt bladder scanned for 95 ml pt still reports she could void.  She has been voiding frequent small amounts she states.  WIll use purewick, and reasses and sent for UA.  MD aware.

## 2024-08-12 NOTE — ED NOTES
There were no vitals taken for this visit.    Gail Cuba is a 84-year-old female presenting with complaints of lower back and hip pain after a fall that occurred about 2 weeks ago.  Is not normally wheelchair-bound but has not been able to walk properly since injury.  Has not been seen yet.  Also had an episode of incontinence this week which is not normal for her.      Given patient's history, I recommended he/she be evaluated in the emergency department.  We discussed that he/she will likely require further testing and/or treatment beyond the capabilities of the current urgent care setting including labs and potential imaging.  Patient expresses understanding of this and agreement with the plan.         Osiris Gonzalez PA-C  08/12/24 1951     24-Jan-2022 13:45

## 2024-08-12 NOTE — MEDICATION SCRIBE - ADMISSION MEDICATION HISTORY
Medication Scribe Admission Medication History    Admission medication history is complete. The information provided in this note is only as accurate as the sources available at the time of the update.    Information Source(s): Patient and Family member via in-person    Pertinent Information: The patient was interviewed bedside with family. We removed viactiv, lopressor, vitamin b-12, DHA, and Vitamin D. We added ibuprofen and acetaminophen. The patient was unsure if they were supposed to still be on metoprolol but it was prescribed in February and only had one fill with a referral to primary care.     Changes made to PTA medication list:  Added: ibuprofen and acetaminophen  Deleted: viactiv, lopressor, vitamin b-12, DHA, and Vitamin D  Changed: None    Allergies reviewed with patient and updates made in EHR: yes    Medication History Completed By: GUCCI FIERRO 8/12/2024 5:23 PM    PTA Med List   Medication Sig Last Dose    acetaminophen (TYLENOL) 500 MG tablet Take 500 mg by mouth nightly as needed for pain 8/11/2024 at hs    ASPIRIN 81 MG OR TABS 1 TABLET DAILY 8/11/2024 at hs    carbidopa-levodopa (SINEMET)  MG tablet Take 1 tablet by mouth 2 times daily 8/12/2024 at 1100    escitalopram (LEXAPRO) 10 MG tablet Take 1.5 tablets (15 mg) by mouth daily 8/12/2024 at 1300    ibuprofen (ADVIL/MOTRIN) 200 MG tablet Take 200-300 mg by mouth nightly as needed for pain 8/11/2024 at hs    Multiple Vitamins-Minerals (MULTIVITAMIN ADULTS PO) Take by mouth daily 8/12/2024 at am

## 2024-08-13 ENCOUNTER — APPOINTMENT (OUTPATIENT)
Dept: PHYSICAL THERAPY | Facility: CLINIC | Age: 84
End: 2024-08-13
Payer: COMMERCIAL

## 2024-08-13 ENCOUNTER — APPOINTMENT (OUTPATIENT)
Dept: OCCUPATIONAL THERAPY | Facility: CLINIC | Age: 84
End: 2024-08-13
Payer: COMMERCIAL

## 2024-08-13 ENCOUNTER — APPOINTMENT (OUTPATIENT)
Dept: CARDIOLOGY | Facility: CLINIC | Age: 84
End: 2024-08-13
Payer: COMMERCIAL

## 2024-08-13 VITALS
TEMPERATURE: 98 F | DIASTOLIC BLOOD PRESSURE: 76 MMHG | HEART RATE: 51 BPM | BODY MASS INDEX: 20.57 KG/M2 | OXYGEN SATURATION: 94 % | RESPIRATION RATE: 16 BRPM | WEIGHT: 118 LBS | SYSTOLIC BLOOD PRESSURE: 126 MMHG

## 2024-08-13 PROBLEM — I63.411 CEREBROVASCULAR ACCIDENT (CVA) DUE TO EMBOLISM OF RIGHT MIDDLE CEREBRAL ARTERY (H): Status: ACTIVE | Noted: 2024-08-13

## 2024-08-13 LAB
ANION GAP SERPL CALCULATED.3IONS-SCNC: 9 MMOL/L (ref 7–15)
BUN SERPL-MCNC: 12 MG/DL (ref 8–23)
CALCIUM SERPL-MCNC: 8.4 MG/DL (ref 8.8–10.4)
CHLORIDE SERPL-SCNC: 107 MMOL/L (ref 98–107)
CREAT SERPL-MCNC: 0.62 MG/DL (ref 0.51–0.95)
EGFRCR SERPLBLD CKD-EPI 2021: 87 ML/MIN/1.73M2
ERYTHROCYTE [DISTWIDTH] IN BLOOD BY AUTOMATED COUNT: 13 % (ref 10–15)
GLUCOSE BLDC GLUCOMTR-MCNC: 100 MG/DL (ref 70–99)
GLUCOSE BLDC GLUCOMTR-MCNC: 109 MG/DL (ref 70–99)
GLUCOSE BLDC GLUCOMTR-MCNC: 97 MG/DL (ref 70–99)
GLUCOSE BLDC GLUCOMTR-MCNC: 97 MG/DL (ref 70–99)
GLUCOSE SERPL-MCNC: 96 MG/DL (ref 70–99)
HCO3 SERPL-SCNC: 25 MMOL/L (ref 22–29)
HCT VFR BLD AUTO: 37 % (ref 35–47)
HGB BLD-MCNC: 11.6 G/DL (ref 11.7–15.7)
LVEF ECHO: NORMAL
MCH RBC QN AUTO: 28.9 PG (ref 26.5–33)
MCHC RBC AUTO-ENTMCNC: 31.4 G/DL (ref 31.5–36.5)
MCV RBC AUTO: 92 FL (ref 78–100)
PLATELET # BLD AUTO: 269 10E3/UL (ref 150–450)
POTASSIUM SERPL-SCNC: 3.5 MMOL/L (ref 3.4–5.3)
RBC # BLD AUTO: 4.01 10E6/UL (ref 3.8–5.2)
SODIUM SERPL-SCNC: 141 MMOL/L (ref 135–145)
WBC # BLD AUTO: 3.8 10E3/UL (ref 4–11)

## 2024-08-13 PROCEDURE — C8929 TTE W OR WO FOL WCON,DOPPLER: HCPCS

## 2024-08-13 PROCEDURE — 97535 SELF CARE MNGMENT TRAINING: CPT | Mod: GO

## 2024-08-13 PROCEDURE — 97530 THERAPEUTIC ACTIVITIES: CPT | Mod: GP

## 2024-08-13 PROCEDURE — 36415 COLL VENOUS BLD VENIPUNCTURE: CPT

## 2024-08-13 PROCEDURE — G0378 HOSPITAL OBSERVATION PER HR: HCPCS

## 2024-08-13 PROCEDURE — 85027 COMPLETE CBC AUTOMATED: CPT

## 2024-08-13 PROCEDURE — 258N000003 HC RX IP 258 OP 636

## 2024-08-13 PROCEDURE — 255N000002 HC RX 255 OP 636: Performed by: STUDENT IN AN ORGANIZED HEALTH CARE EDUCATION/TRAINING PROGRAM

## 2024-08-13 PROCEDURE — 93306 TTE W/DOPPLER COMPLETE: CPT | Mod: 26 | Performed by: INTERNAL MEDICINE

## 2024-08-13 PROCEDURE — 97165 OT EVAL LOW COMPLEX 30 MIN: CPT | Mod: GO

## 2024-08-13 PROCEDURE — 250N000013 HC RX MED GY IP 250 OP 250 PS 637

## 2024-08-13 PROCEDURE — 999N000208 ECHOCARDIOGRAM COMPLETE

## 2024-08-13 PROCEDURE — G0426 INPT/ED TELECONSULT50: HCPCS | Mod: G0 | Performed by: NURSE PRACTITIONER

## 2024-08-13 PROCEDURE — 99239 HOSP IP/OBS DSCHRG MGMT >30: CPT | Performed by: STUDENT IN AN ORGANIZED HEALTH CARE EDUCATION/TRAINING PROGRAM

## 2024-08-13 PROCEDURE — 97161 PT EVAL LOW COMPLEX 20 MIN: CPT | Mod: GP

## 2024-08-13 PROCEDURE — 80048 BASIC METABOLIC PNL TOTAL CA: CPT

## 2024-08-13 RX ORDER — METOPROLOL TARTRATE 25 MG/1
12.5 TABLET, FILM COATED ORAL 2 TIMES DAILY
Qty: 15 TABLET | Refills: 0 | Status: SHIPPED | OUTPATIENT
Start: 2024-08-13 | End: 2024-08-22

## 2024-08-13 RX ADMIN — SODIUM CHLORIDE 1000 ML: 9 INJECTION, SOLUTION INTRAVENOUS at 10:04

## 2024-08-13 RX ADMIN — ESCITALOPRAM 15 MG: 5 TABLET, FILM COATED ORAL at 08:00

## 2024-08-13 RX ADMIN — ACETAMINOPHEN 975 MG: 325 TABLET, FILM COATED ORAL at 04:19

## 2024-08-13 RX ADMIN — APIXABAN 2.5 MG: 2.5 TABLET, FILM COATED ORAL at 08:00

## 2024-08-13 RX ADMIN — CARBIDOPA AND LEVODOPA 1 TABLET: 25; 100 TABLET ORAL at 08:00

## 2024-08-13 RX ADMIN — ACETAMINOPHEN 975 MG: 325 TABLET, FILM COATED ORAL at 10:03

## 2024-08-13 RX ADMIN — ASPIRIN 81 MG: 81 TABLET, COATED ORAL at 08:00

## 2024-08-13 RX ADMIN — HUMAN ALBUMIN MICROSPHERES AND PERFLUTREN 2 ML: 10; .22 INJECTION, SOLUTION INTRAVENOUS at 11:12

## 2024-08-13 ASSESSMENT — ACTIVITIES OF DAILY LIVING (ADL)
ADLS_ACUITY_SCORE: 30
ADLS_ACUITY_SCORE: 29
ADLS_ACUITY_SCORE: 25
DEPENDENT_IADLS:: INDEPENDENT
ADLS_ACUITY_SCORE: 25
ADLS_ACUITY_SCORE: 29
ADLS_ACUITY_SCORE: 30
ADLS_ACUITY_SCORE: 32
ADLS_ACUITY_SCORE: 30
ADLS_ACUITY_SCORE: 29
ADLS_ACUITY_SCORE: 25
ADLS_ACUITY_SCORE: 25
ADLS_ACUITY_SCORE: 30
ADLS_ACUITY_SCORE: 25
ADLS_ACUITY_SCORE: 25
ADLS_ACUITY_SCORE: 29
ADLS_ACUITY_SCORE: 25

## 2024-08-13 NOTE — H&P
[facility]    History and Physical  Hospital Medicine       Date of Admission:  8/12/2024  Date of Service: 8/12/2024     Assessment & Plan   Gail Cuba is a 84 year old female with past medical hx of Afib not on AC, possible parkinsons, HLD, anxiety, depression who presents on 8/12/2024 with a fall 3 weeks PTA.    Fall, initial encounter  Sacral fracture    Fall 3 weeks PTA while rolling out of bed. Fell on buttock and hit head on desk. Has had difficulty ambulating since then due to buttock pain. CT pelvis showed acute comminuted sacral fracture with extension into S1 and S2 neuroforamen. ER provider spoke with [facility] who did not recommend surgery. Incidentally found to have a subacute CVA and afib as below.    - Scheduled Tylenol, Lidocaine patch, PRN Oxycodone  - PT/OT/SW consulted    Subacute CVA of right basal ganglia    Incidental finding with head CT showing nonhemorrhagic likely subacute right basal ganglia infarct with regional mass effect. Pt has no focal findings or symptoms.     MRI confirmed subacute right corpus striatal infarct. No mass effect or hemorrhage and no large vessel occlusion or stenosis.    - Stroke neuro consulted  - Echo in am  - Tele    Atrial fibrillation with RVR    Initially diagnosed 2/2024 where she declined admission and was started on Metoprolol 25 BID. However, pt reported she stopped taking Metoprolol in April due to not having refills. Not on anticoagulation besides Aspirin 81. [id number] 6.  HAS-BLED 3.      Arrived with HR 140s and lowered to 80s with PRN Diltiazem. Pt asymptomatic from afib.     - Pharmacy consult for DOAC cost analysis  - Restart Metoprolol 25 BID  - Continue PTA Aspirin 81, okay per neuro but recommending DOAC   - Started Apixaban 2.5mg BID  - PRN Diltiazem 10mg IV for HR >110    Possible Parkinsons  PCP noted shuffling gait on 5/2024 with cogwheel rigidity. She was started on Sinemet and had improvement. Is working on  getting outpatient neurology appointment for cognitive decline and gait instability.   - Continue PTA Carbidopa-Levodopa  BID    Hyperlipidemia LDL goal <130  LDL 75 on admission. Not on statin PTA.    Anxiety  Recurrent major depressive disorder, in partial remission (H24)  - Continue PTA Escitalopram 15       Clinically Significant Risk Factors Present on Admission               # Coagulation Defect: INR = 1.21 (Ref range: 0.85 - 1.15) and/or PTT = N/A, will monitor for bleeding  # Drug Induced Platelet Defect: home medication list includes an antiplatelet medication                    Diet: NPO for Medical/Clinical Reasons Except for: Meds  Regular Diet Adult  DVT Prophylaxis: DOAC  Pratt Catheter: Not present  Code Status: No CPR- Do NOT Intubate  Lines: peripheral IV    Disposition Plan      Expected Discharge Date: 08/14/2024             Entered: Emeka Zamora PA-C 08/12/2024, 9:40 PM     Status: Patient is appropriate for inpatient admission for pain control of sacral fractures. Will likely need TCU, but will be hesitant. Started DOAC for afib and CVA. Restarted Metoprolol and stressed compliance with pt. Echo in am  Emeka Zamora PA-C        The patient's care was discussed with the Attending Physician, Dr. Hyde .    Primary Care Physician   Aisha Hull [phone]    History is obtained from the patient and review of old records via the EMR.    History of Present Illness   Gail Cuba is a 84 year old female with past medical hx of Afib, probable parkinsons, HLD, anxiety, depression who presents on 8/12/2024 with a fall 3 weeks PTA.    3 weeks ago patient fell out of bed when rolling over.  She landed in between her computer desk in bed.  She landed on her bottom but hit the right side of her head on the computer desk.  She has had right buttock pain since the fall.  She has not had any fevers, chills, vision changes, one-sided weakness, sensory changes, other falls, poor p.o.  intake, palpitations, chest pain.  She has had intermittent right forehead headaches.  Patient lives alone in an apartment.    She is noted to be in A-fib with RVR, this was diagnosed in February where she declined admission and was started on metoprolol.  She presented in the 140s.  She is asymptomatic with this.    She was incidentally found to have a stroke of the right basal ganglia.  She has never had a stroke before.  She is completely asymptomatic from this.    Review of Systems   The 5 point Review of Systems is negative other than noted in the HPI or here.     Past Medical History    Past Medical History:   Diagnosis Date    Anxiety     Breast cancer (H) 2009    Right breast cancer    Breast mass     Chest pain     Hypertension     MEDICAL HISTORY OF - 6-08    Rt breast cancer treated with radiation    Urge incontinence      Patient Active Problem List    Diagnosis Date Noted    Abnormal head CT 08/12/2024     Priority: Medium    Rapid atrial fibrillation (H) 08/12/2024     Priority: Medium    Abnormal CT scan, pelvis 08/12/2024     Priority: Medium    Cerebrovascular accident (H) 08/12/2024     Priority: Medium    Fall 08/12/2024     Priority: Medium    Closed fracture of sacrum (H) 08/12/2024     Priority: Medium    Vitamin B12 deficiency (non anemic) 01/13/2024     Priority: Medium    Recurrent major depressive disorder, in partial remission (H24) 01/20/2022     Priority: Medium    Osteoporosis 06/15/2015     Priority: Medium    Endometrial mass 12/20/2013     Priority: Medium     D and C,hysteroscopy, polypectomy  Stenotic os in clinic-cytotec 200mcg night prior      Anxiety 03/05/2013     Priority: Medium    Urge incontinence of urine 07/11/2011     Priority: Medium    Hyperlipidemia LDL goal <130 10/31/2010     Priority: Medium     Recent Labs   Lab Test 9/23/09 0908 6/29/07 0749    CHOL 194 247*    HDL 44* 57    LDL 99 142*    TRIG 252* 239*    CHOLHDLRATIO 4.0 4.0           Back pain 05/20/2010      Priority: Medium     4/17/2010 [facility] MRI IMPRESSION:     1. Postsurgical changes of a posterior decompression/left hemilaminotomy at L3-L4. No central stenosis. However, there is a small to moderate-sized central to left paracentral disc protrusion  with posterior displacement of the left descending L4 nerve root. 2. Foraminal stenosis greatest on the left at L3-L4 and bilaterally at L4-L5.  3. Multilevel degenerative disc disease greatest at L3-L4 and L4-L5 where there is adjacent discogenic endplate reactive marrow edema which appears to have progressed when compared to 5/18/2009.   4. Endometrial thickening, irregularity, and ill definition. Endometrial carcinoma cannot be excluded. There is also a probable anterior pedunculated uterine fibroid.       THICKENING ENDOMETRIUM 05/06/2010     Priority: Medium    Personal history of malignant neoplasm of breast 12/29/2008     Priority: Medium     Infiltrating ductal cancer  - T1 N0  2/19/09 - started XRT in 26 fractions, with boost with at end to cavity.          Past Surgical History   Past Surgical History:   Procedure Laterality Date    BIOPSY BREAST      BREAST LUMPECTOMY, RT/LT  1-26-09    Breast Lumpectomy RT    COLONOSCOPY  7/25/2013    Procedure: COLONOSCOPY;  Colonoscopy  ;  Surgeon: Deep Purcell MD;  Location: [facility]    D & C  5/24/10    for EMB     DILATION AND CURETTAGE, OPERATIVE HYSTEROSCOPY, COMBINED  1/14/2014    Procedure: COMBINED DILATION AND CURETTAGE, OPERATIVE HYSTEROSCOPY;  Hysteroscopy with dilation and curettage, possible polypectomy- choice anes  ;  Surgeon: Lauryn Hoffman MD;  Location: [facility] EXCISION BREAST LESION, OPEN >=1  6-13-08    with sentinal node biopsy    LUMPECTOMY BREAST  2009    right breast    PHACOEMULSIFICATION WITH STANDARD INTRAOCULAR LENS IMPLANT Left 2/12/2020    Procedure: Cataract Removal with Implant;  Surgeon: Jose Cerrato MD;  Location: [facility]     PHACOEMULSIFICATION WITH STANDARD INTRAOCULAR LENS IMPLANT Right 3/4/2020    Procedure: Cataract Removal with Implant;  Surgeon: Jose Cerrato MD;  Location: [facility]    SURGICAL HISTORY OF -       Neck surgery.    SURGICAL HISTORY OF -   09    L4-5 back surgery        Prior to Admission Medications   Prior to Admission Medications   Prescriptions Last Dose Informant Patient Reported? Taking?   ASPIRIN 81 MG OR TABS 2024 at hs Self Yes Yes   Si TABLET DAILY   Multiple Vitamins-Minerals (MULTIVITAMIN ADULTS PO) Unknown Self Yes Yes   Sig: Take by mouth daily   acetaminophen (TYLENOL) 500 MG tablet 2024 at hs Self Yes Yes   Sig: Take 500 mg by mouth nightly as needed for pain   carbidopa-levodopa (SINEMET)  MG tablet 2024 at 1400 Self No Yes   Sig: Take 1 tablet by mouth 2 times daily   escitalopram (LEXAPRO) 10 MG tablet 2024 at 1300 Self No Yes   Sig: Take 1.5 tablets (15 mg) by mouth daily   ibuprofen (ADVIL/MOTRIN) 200 MG tablet 2024 at hs Self Yes Yes   Sig: Take 200-300 mg by mouth nightly as needed for pain      Facility-Administered Medications: None     Allergies   Allergies   Allergen Reactions    Latex      Blistery rash from gloves    Nkda [No Known Drug Allergy]        Family History    Family History   Problem Relation Age of Onset    Alcohol/Drug Mother         alcohalic    Hypertension Mother     Cerebrovascular Disease Mother     Thyroid Disease Sister     Hyperlipidemia Sister     Depression Sister     Anxiety Disorder Sister     Osteoporosis Sister     Musculoskeletal Disorder Son     Hypertension Son     Diabetes Father     Prostate Cancer Father     Cancer - colorectal Maternal Grandfather     Diabetes Brother     Depression Brother     Asthma No family hx of     C.A.D. No family hx of     Breast Cancer No family hx of     Anesthesia Reaction No family hx of     Blood Disease No family hx of     Ovarian Cancer No family hx of      Social History    Social History     Socioeconomic History    Marital status: Single     Spouse name: Not on file    Number of children: 2    Years of education: 12    Highest education level: Not on file   Occupational History     Employer: RETIRED   Tobacco Use    Smoking status: Never    Smokeless tobacco: Never   Vaping Use    Vaping status: Never Used   Substance and Sexual Activity    Alcohol use: Yes     Comment: Occasional    Drug use: No    Sexual activity: Not Currently   Other Topics Concern     Service No    Blood Transfusions No    Caffeine Concern Yes     Comment: 10 cups of coffee daily    Occupational Exposure No    Hobby Hazards No    Sleep Concern Yes     Comment: Insomnia. wakes to use bathroom    Stress Concern No    Weight Concern Yes     Comment: Thinks she's overweight    Special Diet No    Back Care Yes    Exercise Yes    Bike Helmet Yes    Seat Belt Yes    Self-Exams Yes    Parent/sibling w/ CABG, MI or angioplasty before 65F 55M? No   Social History Narrative    Not on file     Social Determinants of Health     Financial Resource Strain: Not on file   Food Insecurity: Not on file   Transportation Needs: Not on file   Physical Activity: Not on file   Stress: Not on file   Social Connections: Not on file   Interpersonal Safety: Not on file   Housing Stability: Not on file     Physical Exam   BP (!) 153/96   Pulse 92   Temp 97.8  F (36.6  C) (Oral)   Resp 16   Wt 53.5 kg (118 lb)   SpO2 94%   BMI 20.57 kg/m       Weight: 118 lbs 0 oz Body mass index is 20.57 kg/m .     Constitutional: Alert, oriented, cooperative, in no acute distress, appears nontoxic.  HENT: Oropharynx is clear and moist. No evidence of cranial trauma. Normocephalic. Eyes anicteric. EOM intact. PERRLA  Cardiovascular: Regular rate and irregular rhythm, S3 present, and no murmur noted. No lower extremity edema.  Respiratory: Clear to auscultation bilaterally with no adventitious breath sounds.   GI: Soft, non-tender, normal  bowel sounds, no hepatomegaly, no masses.  Musculoskeletal: Normal muscle bulk and tone. FROM in all extremities   Skin: Warm and dry, no rashes.   Psych: Normal affect and speech.  Neurologic: Cranial nerves 2-12 are grossly intact. A&Ox4. Strength 5/5 in all extremities. No pronator drift. Sensation intact bilaterally. No slurred speech or word finding difficulty.      Data   Data reviewed today:   Recent Labs   Lab 08/12/24  1654 08/12/24  1652   WBC  --  5.7   HGB  --  12.6   MCV  --  89   PLT  --  293   INR 1.21*  --    NA  --  142   POTASSIUM  --  3.8   CHLORIDE  --  108*   CO2  --  25   BUN  --  14.7   CR  --  0.54   ANIONGAP  --  9   CHRISTINE  --  8.9   GLC  --  115*   ALBUMIN  --  3.8   PROTTOTAL  --  6.3*   BILITOTAL  --  0.5   ALKPHOS  --  145   ALT  --  6   AST  --  23       Recent Results (from the past 24 hour(s))   CT Pelvis Bone wo Contrast    Narrative    EXAM: CT PELVIS BONE WO CONTRAST  LOCATION: [facility]  DATE: 8/12/2024    INDICATION: Unwitnessed fall out of bed  3 weeks prior. Right sacral pain. Evaluate for acute bony process after blunt trauma.  COMPARISON: None.  TECHNIQUE: CT scan of the pelvis was performed without IV contrast. Multiplanar reformats were obtained. Dose reduction techniques were used.  CONTRAST: None.    FINDINGS:    BONES AND JOINTS:  Acute comminuted fracture of the bilateral sacral ala at the level of S1 and S2 anteriorly with minimal displacement and impaction. There is fracture extension to the bilateral S1 neural foramina, and possible S2 neural foramina. There is normal joint   alignment. Mild bilateral hip and sacroiliac joint degenerative changes. Severe degenerative changes of the lower lumbar spine. Osteopenia.    SOFT TISSUES:  Mild presacral fat stranding. No discrete fluid collection. Diffuse muscle atrophy. Atherosclerosis. Colonic diverticulosis.      Impression    IMPRESSION:  1.  Acute comminuted sacral fracture with minimal  displacement and neural foraminal extension.  2.  Other ancillary findings as described above.     Head CT w/o contrast    Narrative    EXAM: CT HEAD W/O CONTRAST  LOCATION: [facility]  DATE: 8/12/2024    INDICATION: Unwitnessed fall out of bed 3 weeks prior. Advanced age. Evaluate for acute intracranial process.  COMPARISON: CT dated 2/21/2024   TECHNIQUE: Routine CT Head without IV contrast. Multiplanar reformats. Dose reduction techniques were used.    FINDINGS:  INTRACRANIAL CONTENTS: No acute intracranial hemorrhage, extra-axial fluid collection, or mass effect. Again noted subtle mineralization of the mediodorsal thalami. No evidence of an acute transcortical confluent infarct. No present on the prior exam, a   well-circumscribed hypodensity involving the right caudate head and putamen with mild regional mass effect partially effacing the frontal horn of the right lateral ventricle. No interval ventriculomegaly or midline shift. Patent basal cisterns. Grossly   similar patchy and confluent bilateral cerebral white matter hypodensities, nonspecific although most likely the sequela of chronic microvascular ischemia. Moderate to advanced generalized cerebral and mild cerebellar parenchymal volume loss.     VISUALIZED ORBITS/SINUSES/MASTOIDS: No acute intraorbital finding. No evidence of significant paranasal sinus or mastoid mucosal disease.     BONES/SOFT TISSUES: No acute calvarial injury or significant scalp hematoma.      Impression    IMPRESSION:  1.  A nonhemorrhagic likely subacute right basal ganglia infarct with mild regional mass effect. No midline shift or hydrocephalus.  2.  No evidence of acute traumatic brain injury.  3.  Chronic age-related changes, as described.       I personally reviewed the head CT image(s) showing stroke with mass effect and the pelvic CT image(s) showing sacral fx

## 2024-08-13 NOTE — DISCHARGE SUMMARY
[facility]  Hospitalist Discharge Summary      Date of Admission:  8/12/2024  Date of Discharge:  8/13/2024  Discharging Provider: Zion Perez MD  Discharge Service: Hospitalist Service    Discharge Diagnoses   # Fall, initial encounter  # Sacral fracture  # Subacute CVA of right basal ganglia  # Atrial fibrillation with RVR  # Possible Parkinsons  # Hyperlipidemia LDL goal <130  # Anxiety  # Recurrent major depressive disorder, in partial remission (H24)    Clinically Significant Risk Factors          Follow-ups Needed After Discharge   Follow-up Appointments     Follow-up and recommended labs and tests       Follow up with primary care provider, Aisha Hull, within 7 days to   evaluate medication change and for hospital follow- up.      Plan to follow up with neurology within about 6 weeks.          Unresulted Labs Ordered in the Past 30 Days of this Admission       No orders found for last 31 day(s).        These results will be followed up by Zion Perez MD.    Discharge Disposition   Discharged to home  Condition at discharge: Stable    Hospital Course   Gail Cuba is a 84 year old female with past medical hx of Afib not on AC, possible parkinsons, HLD, anxiety, depression who presents on 8/12/2024 with a fall 3 weeks PTA. Found to have pelvic fracture, sub-acute CVA, and afib with RVR. Rate controlled, started on anticoagulation, and cleared by PT/PT for home with home cares.     # Fall, initial encounter  # Sacral fracture  Fall 3 weeks PTA while rolling out of bed. Fell on buttock and hit head on desk. Has had difficulty ambulating since then due to buttock pain. CT pelvis showed acute comminuted sacral fracture with extension into S1 and S2 neuroforamen. ER provider spoke with [facility] who did not recommend surgery. Incidentally found to have a subacute CVA and afib as below. PT and OT saw patient during admission  - Home cares PT, OT, Nursing, and home  health aid   - Acetaminophen for pain at home, not requiring opioids during admission     # Subacute CVA of right basal ganglia  Incidental finding with head CT showing nonhemorrhagic likely subacute right basal ganglia infarct with regional mass effect. Pt has no focal findings or symptoms. MRI confirmed subacute right corpus striatal infarct. No mass effect or hemorrhage and no large vessel occlusion or stenosis. TTE without thrombus LVEF 50-55% and moderate mitral and tricuspid regurgitation.   - Started on apixaban for afib   - No need for antiplatelet from a stroke perspective   - Follow up with general neuro or stroke neuro david in 6-8 weeks   - Stroke neuro consulted    # Atrial fibrillation with RVR  Initially diagnosed 2/2024 where she declined admission and was started on Metoprolol 25 BID. However, pt reported she stopped taking Metoprolol in April due to not having refills. Not on anticoagulation besides Aspirin 81. [id number] 6.  HAS-BLED 3. RVR controlled with IV diltiazem and then restarted on metoprolol. Given pauses approaching 2 seconds overnight dropped dose from 25 mf bid to 12.5 mg bid.  - Started apixaban this admission  - Restarted metoprolol at lower dose of 12.5 mg bid   - Stopped PTA ASA 81 with start of DOAC     # Possible Parkinsons  PCP noted shuffling gait on 5/2024 with cogwheel rigidity. She was started on Sinemet and had improvement. Is working on getting outpatient neurology appointment for cognitive decline and gait instability.   - Continued PTA Carbidopa-Levodopa  BID    # Hyperlipidemia LDL goal <130  LDL 75 on admission. Not on statin PTA.    # Anxiety  # Recurrent major depressive disorder, in partial remission (H24)  - Continue PTA Escitalopram 15       Consultations This Hospital Stay   PHARMACY IP CONSULT  NEUROLOGY IP STROKE CONSULT  SPEECH LANGUAGE PATH ADULT IP CONSULT  PHARMACY IP CONSULT  PHARMACY IP CONSULT  PHARMACY IP CONSULT  PHYSICAL THERAPY ADULT IP  CONSULT  OCCUPATIONAL THERAPY ADULT IP CONSULT  REHAB ADMISSIONS LIAISON IP CONSULT  CARE MANAGEMENT / SOCIAL WORK IP CONSULT  SMOKING CESSATION PROGRAM IP CONSULT    Code Status   No CPR- Do NOT Intubate    Time Spent on this Encounter   I, Zion Perez MD, personally saw the patient today and spent greater than 30 minutes discharging this patient.       Zion Perez MD  [facility] MEDICAL SURGICAL  [address] [address]  Phone: [phone]  Fax: [phone]  ______________________________________________________________________    Physical Exam   Vital Signs: Temp: 98  F (36.7  C) Temp src: Oral BP: 126/76 Pulse: 51   Resp: 16 SpO2: 94 % O2 Device: None (Room air)    Weight: 118 lbs 0 oz  General Appearance: Awake and alert, laying back in bed, in no acute distress   Respiratory: Breathing easily on room air   Cardiovascular: Regular rate and irregular rhythm   GI: Abdomen soft, non-tender and non-distended   Skin: No rashes or lesions   Other: Appropriate mood and affect, no focal deficits appreciated         Primary Care Physician   Aisha Hull    Discharge Orders      Home Care Referral      Reason for your hospital stay    You presented to the hospital after having a fall at home and were found to have fractures of your pelvis, a prior stroke, and a heart rhythm called atrial fibrillation. You are discharging back to home with home care and a new blood thinning medication.     Follow-up and recommended labs and tests     Follow up with primary care provider, Aisha Hull, within 7 days to evaluate medication change and for hospital follow- up.      Plan to follow up with neurology within about 6 weeks.     Activity    Your activity upon discharge: activity as tolerated     Diet    Follow this diet upon discharge: Orders Placed This Encounter      Regular Diet Adult     Stroke Hospital Follow Up (for neurologist use only)    [employer] will call  you to coordinate care as prescribed by your provider. If you don t hear from a representative within 2 business days, please call [phone].         Significant Results and Procedures   Results for orders placed or performed during the hospital encounter of 08/12/24   CT Pelvis Bone wo Contrast    Narrative    EXAM: CT PELVIS BONE WO CONTRAST  LOCATION: [facility]  DATE: 8/12/2024    INDICATION: Unwitnessed fall out of bed  3 weeks prior. Right sacral pain. Evaluate for acute bony process after blunt trauma.  COMPARISON: None.  TECHNIQUE: CT scan of the pelvis was performed without IV contrast. Multiplanar reformats were obtained. Dose reduction techniques were used.  CONTRAST: None.    FINDINGS:    BONES AND JOINTS:  Acute comminuted fracture of the bilateral sacral ala at the level of S1 and S2 anteriorly with minimal displacement and impaction. There is fracture extension to the bilateral S1 neural foramina, and possible S2 neural foramina. There is normal joint   alignment. Mild bilateral hip and sacroiliac joint degenerative changes. Severe degenerative changes of the lower lumbar spine. Osteopenia.    SOFT TISSUES:  Mild presacral fat stranding. No discrete fluid collection. Diffuse muscle atrophy. Atherosclerosis. Colonic diverticulosis.      Impression    IMPRESSION:  1.  Acute comminuted sacral fracture with minimal displacement and neural foraminal extension.  2.  Other ancillary findings as described above.     Head CT w/o contrast    Narrative    EXAM: CT HEAD W/O CONTRAST  LOCATION: [facility]  DATE: 8/12/2024    INDICATION: Unwitnessed fall out of bed 3 weeks prior. Advanced age. Evaluate for acute intracranial process.  COMPARISON: CT dated 2/21/2024   TECHNIQUE: Routine CT Head without IV contrast. Multiplanar reformats. Dose reduction techniques were used.    FINDINGS:  INTRACRANIAL CONTENTS: No acute intracranial hemorrhage, extra-axial  fluid collection, or mass effect. Again noted subtle mineralization of the mediodorsal thalami. No evidence of an acute transcortical confluent infarct. No present on the prior exam, a   well-circumscribed hypodensity involving the right caudate head and putamen with mild regional mass effect partially effacing the frontal horn of the right lateral ventricle. No interval ventriculomegaly or midline shift. Patent basal cisterns. Grossly   similar patchy and confluent bilateral cerebral white matter hypodensities, nonspecific although most likely the sequela of chronic microvascular ischemia. Moderate to advanced generalized cerebral and mild cerebellar parenchymal volume loss.     VISUALIZED ORBITS/SINUSES/MASTOIDS: No acute intraorbital finding. No evidence of significant paranasal sinus or mastoid mucosal disease.     BONES/SOFT TISSUES: No acute calvarial injury or significant scalp hematoma.      Impression    IMPRESSION:  1.  A nonhemorrhagic likely subacute right basal ganglia infarct with mild regional mass effect. No midline shift or hydrocephalus.  2.  No evidence of acute traumatic brain injury.  3.  Chronic age-related changes, as described.   MR Brain w/o & w Contrast    Narrative    EXAM: MR BRAIN W/O and W CONTRAST, MRA BRAIN ([place] OF [place]) W/O CONTRAST, MRA NECK (CAROTIDS) W/O and W CONTRAST  LOCATION: [facility]  DATE/TIME: 8/12/2024 9:05 PM CDT    INDICATION: Unwitnessed fall at home 3 weeks prior.  Bilateral sacral fractures.  Abnormal head CT  nonhemorrhagic likely subacute right basal ganglia infarct with mild regional mass effect  COMPARISON:  Same day CT head.  CONTRAST: Gadavist 10 mL  TECHNIQUE:   1) Routine multiplanar multisequence head MRI with and without intravenous contrast.  2) 3D time-of-flight head MRA without intravenous contrast.  3) Neck MRA without and with IV contrast. Stenosis measurements made according to NASCET criteria unless otherwise  specified.    FINDINGS:  HEAD MRI:  INTRACRANIAL CONTENTS:  Subacute right corpus striatal infarct with associated enhancement. No mass, acute hemorrhage, or extra-axial fluid collections. Scattered nonspecific T2/FLAIR hyperintensities within the cerebral white matter most consistent with   mild chronic microvascular ischemic change. Moderate generalized cerebral atrophy. No hydrocephalus. Normal position of the cerebellar tonsils.    SELLA: No abnormality accounting for technique.    OSSEOUS STRUCTURES/SOFT TISSUES: Normal marrow signal.     ORBITS: Prior bilateral cataract surgery. Visualized portions of the orbits are otherwise unremarkable.     SINUSES/MASTOIDS: No paranasal sinus mucosal disease. No middle ear or mastoid effusion.     HEAD MRA:   ANTERIOR CIRCULATION: No stenosis/occlusion, aneurysm, or high flow vascular malformation. Standard [place] of [place] anatomy.    POSTERIOR CIRCULATION: No stenosis/occlusion, aneurysm, or high flow vascular malformation. Balanced vertebral arteries supply a normal basilar artery.     NECK MRA:   RIGHT CAROTID: No measurable stenosis or dissection.    LEFT CAROTID: No measurable stenosis or dissection.    VERTEBRAL ARTERIES: No focal stenosis or dissection. Balanced vertebral arteries.    AORTIC ARCH: Classic aortic arch anatomy with no significant stenosis at the origin of the great vessels.      Impression    IMPRESSION:  HEAD MRI:   1.  Subacute right corpus striatal infarct.  2.  No mass effect or hemorrhage.    HEAD MRA:   1.  No large vessel occlusion or hemodynamically significant stenosis.    NECK MRA:  1.  No large vessel occlusion or hemodynamically significant stenosis.    Emeka Zamora was contacted by me on 8/12/2024 9:42 PM CDT with the preliminary report.   MRA Neck (Carotids) wo & w Contrast    Narrative    EXAM: MR BRAIN W/O and W CONTRAST, MRA BRAIN ([place] OF [place]) W/O CONTRAST, MRA NECK (CAROTIDS) W/O and W CONTRAST  LOCATION: [facility]  [facility]  DATE/TIME: 8/12/2024 9:05 PM CDT    INDICATION: Unwitnessed fall at home 3 weeks prior.  Bilateral sacral fractures.  Abnormal head CT  nonhemorrhagic likely subacute right basal ganglia infarct with mild regional mass effect  COMPARISON:  Same day CT head.  CONTRAST: Gadavist 10 mL  TECHNIQUE:   1) Routine multiplanar multisequence head MRI with and without intravenous contrast.  2) 3D time-of-flight head MRA without intravenous contrast.  3) Neck MRA without and with IV contrast. Stenosis measurements made according to NASCET criteria unless otherwise specified.    FINDINGS:  HEAD MRI:  INTRACRANIAL CONTENTS:  Subacute right corpus striatal infarct with associated enhancement. No mass, acute hemorrhage, or extra-axial fluid collections. Scattered nonspecific T2/FLAIR hyperintensities within the cerebral white matter most consistent with   mild chronic microvascular ischemic change. Moderate generalized cerebral atrophy. No hydrocephalus. Normal position of the cerebellar tonsils.    SELLA: No abnormality accounting for technique.    OSSEOUS STRUCTURES/SOFT TISSUES: Normal marrow signal.     ORBITS: Prior bilateral cataract surgery. Visualized portions of the orbits are otherwise unremarkable.     SINUSES/MASTOIDS: No paranasal sinus mucosal disease. No middle ear or mastoid effusion.     HEAD MRA:   ANTERIOR CIRCULATION: No stenosis/occlusion, aneurysm, or high flow vascular malformation. Standard [place] of [place] anatomy.    POSTERIOR CIRCULATION: No stenosis/occlusion, aneurysm, or high flow vascular malformation. Balanced vertebral arteries supply a normal basilar artery.     NECK MRA:   RIGHT CAROTID: No measurable stenosis or dissection.    LEFT CAROTID: No measurable stenosis or dissection.    VERTEBRAL ARTERIES: No focal stenosis or dissection. Balanced vertebral arteries.    AORTIC ARCH: Classic aortic arch anatomy with no significant stenosis at the origin of the great vessels.       Impression    IMPRESSION:  HEAD MRI:   1.  Subacute right corpus striatal infarct.  2.  No mass effect or hemorrhage.    HEAD MRA:   1.  No large vessel occlusion or hemodynamically significant stenosis.    NECK MRA:  1.  No large vessel occlusion or hemodynamically significant stenosis.    Emeka Zamora was contacted by me on 8/12/2024 9:42 PM CDT with the preliminary report.   MRA Brain ([place] of [place]) wo Contrast    Narrative    EXAM: MR BRAIN W/O and W CONTRAST, MRA BRAIN ([place] OF [place]) W/O CONTRAST, MRA NECK (CAROTIDS) W/O and W CONTRAST  LOCATION: [facility]  DATE/TIME: 8/12/2024 9:05 PM CDT    INDICATION: Unwitnessed fall at home 3 weeks prior.  Bilateral sacral fractures.  Abnormal head CT  nonhemorrhagic likely subacute right basal ganglia infarct with mild regional mass effect  COMPARISON:  Same day CT head.  CONTRAST: Gadavist 10 mL  TECHNIQUE:   1) Routine multiplanar multisequence head MRI with and without intravenous contrast.  2) 3D time-of-flight head MRA without intravenous contrast.  3) Neck MRA without and with IV contrast. Stenosis measurements made according to NASCET criteria unless otherwise specified.    FINDINGS:  HEAD MRI:  INTRACRANIAL CONTENTS:  Subacute right corpus striatal infarct with associated enhancement. No mass, acute hemorrhage, or extra-axial fluid collections. Scattered nonspecific T2/FLAIR hyperintensities within the cerebral white matter most consistent with   mild chronic microvascular ischemic change. Moderate generalized cerebral atrophy. No hydrocephalus. Normal position of the cerebellar tonsils.    SELLA: No abnormality accounting for technique.    OSSEOUS STRUCTURES/SOFT TISSUES: Normal marrow signal.     ORBITS: Prior bilateral cataract surgery. Visualized portions of the orbits are otherwise unremarkable.     SINUSES/MASTOIDS: No paranasal sinus mucosal disease. No middle ear or mastoid effusion.     HEAD MRA:   ANTERIOR  CIRCULATION: No stenosis/occlusion, aneurysm, or high flow vascular malformation. Standard [place] of [place] anatomy.    POSTERIOR CIRCULATION: No stenosis/occlusion, aneurysm, or high flow vascular malformation. Balanced vertebral arteries supply a normal basilar artery.     NECK MRA:   RIGHT CAROTID: No measurable stenosis or dissection.    LEFT CAROTID: No measurable stenosis or dissection.    VERTEBRAL ARTERIES: No focal stenosis or dissection. Balanced vertebral arteries.    AORTIC ARCH: Classic aortic arch anatomy with no significant stenosis at the origin of the great vessels.      Impression    IMPRESSION:  HEAD MRI:   1.  Subacute right corpus striatal infarct.  2.  No mass effect or hemorrhage.    HEAD MRA:   1.  No large vessel occlusion or hemodynamically significant stenosis.    NECK MRA:  1.  No large vessel occlusion or hemodynamically significant stenosis.    Kalyani Schwartz was contacted by me on 2024 9:42 PM CDT with the preliminary report.   Echocardiogram Complete - For age > 60 yrs     Value    LVEF  50-55%    Narrative    [id number]  [id number]  [id number]  [id number]^LANA^KALYANI     [facility]  Echocardiography Laboratory  [facility] [place] [address]     Name: LILLI CASH  MRN: [id number]  : 1940  Study Date: 2024 10:44 AM  Age: 84 yrs  Gender: Female  Patient Location: [facility]  Reason For Study: Cerebrovascular Incident  Ordering Physician: KALYANI SCHWARTZ  Referring Physician: Aisha Hull  Performed By: Malu Jones RDCS     BSA: 1.6 m2  Height: 64 in  Weight: 118 lb  BP: 145/96 mmHg  ______________________________________________________________________________  Procedure  Complete Portable Echo Adult. Optison (NDC #4165-1525) given intravenously.  ______________________________________________________________________________  Interpretation Summary     1. The left ventricle is normal in size. There is normal left ventricular  wall  thickness. Left ventricular systolic function is low normal. The visual  ejection fraction is 50-55%. Diastolic function not assessed due to atrial  fibrillation. There is mild global hypokinesia of the left ventricle. There is  no thrombus seen in the left ventricle.  2. The right ventricle is normal size. The right ventricular systolic function  is normal.  3. Moderate mitral and tricuspid regurgitation.  4. No pericardial effusion.  5. No previous study for comparison.  ______________________________________________________________________________  Left Ventricle  The left ventricle is normal in size. There is normal left ventricular wall  thickness. Left ventricular systolic function is low normal. The visual  ejection fraction is 50-55%. Diastolic function not assessed due to atrial  fibrillation. There is mild global hypokinesia of the left ventricle. There is  no thrombus seen in the left ventricle.     Right Ventricle  The right ventricle is normal size. The right ventricular systolic function is  normal.     Atria  There is mild-moderate biatrial enlargement. There is no color Doppler  evidence of an atrial shunt.     Mitral Valve  There is mild mitral annular calcification. There is moderate (2+) mitral  regurgitation.     Tricuspid Valve  There is moderate (2+) tricuspid regurgitation. The right ventricular systolic  pressure is approximated at 24.9 mmHg plus the right atrial pressure. IVC  diameter >2.1 cm collapsing <50% with sniff suggests a high RA pressure  estimated at 15 mmHg or greater.     Aortic Valve  There is mild trileaflet aortic sclerosis. There is trace aortic  regurgitation. No aortic stenosis is present.     Pulmonic Valve  There is no pulmonic valvular stenosis.     Vessels  The aortic root is normal size. Normal size ascending aorta. Descending aortic  velocity normal. Dilation of the inferior vena cava is present with abnormal  respiratory variation in diameter.      Pericardium  There is no pericardial effusion.     Rhythm  The rhythm was atrial fibrillation.  ______________________________________________________________________________  MMode/2D Measurements & Calculations     IVSd: 1.0 cm  LVIDd: 3.0 cm  LVIDs: 2.6 cm  LVPWd: 0.74 cm  FS: 13.8 %  LV mass(C)d: 69.2 grams  LV mass(C)dI: 44.3 grams/m2  Ao root diam: 2.6 cm  LA dimension: 3.7 cm  asc Aorta Diam: 3.3 cm  LA/Ao: 1.4  Ao root diam index Ht(cm/m): 1.6  Ao root diam index BSA (cm/m2): 1.6  Asc Ao diam index BSA (cm/m2): 2.1  Asc Ao diam index Ht(cm/m): 2.0  EF Biplane: 56.6 %  LA Volume (BP): 47.6 ml     LA Volume Index (BP): 30.5 ml/m2  RV Base: 3.4 cm  RWT: 0.49     Doppler Measurements & Calculations  MV E max brandon: 93.2 cm/sec  MV dec slope: 516.4 cm/sec2  MV dec time: 0.18 sec  TR max brandon: 249.6 cm/sec  TR max P.9 mmHg  E/E' av.6  Lateral E/e': 7.9  Medial E/e': 11.3     ______________________________________________________________________________  Report approved by: Kelli Samayoa 2024 12:27 PM             Discharge Medications   Current Discharge Medication List        START taking these medications    Details   apixaban ANTICOAGULANT (ELIQUIS) 2.5 MG tablet Take 1 tablet (2.5 mg) by mouth 2 times daily  Qty: 60 tablet, Refills: 0    Associated Diagnoses: Rapid atrial fibrillation (H)      metoprolol tartrate (LOPRESSOR) 25 MG tablet Take 0.5 tablets (12.5 mg) by mouth 2 times daily  Qty: 15 tablet, Refills: 0    Associated Diagnoses: Rapid atrial fibrillation (H)           CONTINUE these medications which have NOT CHANGED    Details   acetaminophen (TYLENOL) 500 MG tablet Take 500 mg by mouth nightly as needed for pain      carbidopa-levodopa (SINEMET)  MG tablet Take 1 tablet by mouth 2 times daily  Qty: 180 tablet, Refills: 3    Associated Diagnoses: Shuffling gait      escitalopram (LEXAPRO) 10 MG tablet Take 1.5 tablets (15 mg) by mouth daily  Qty: 135 tablet, Refills: 3     Associated Diagnoses: Recurrent major depressive disorder, in partial remission (H24)      ibuprofen (ADVIL/MOTRIN) 200 MG tablet Take 200-300 mg by mouth nightly as needed for pain      Multiple Vitamins-Minerals (MULTIVITAMIN ADULTS PO) Take by mouth daily           STOP taking these medications       ASPIRIN 81 MG OR TABS Comments:   Reason for Stopping:             Allergies   Allergies   Allergen Reactions    Latex      Blistery rash from gloves    Nkda [No Known Drug Allergy]

## 2024-08-13 NOTE — PLAN OF CARE
Goal Outcome Evaluation:      Plan of Care Reviewed With: patient          Outcome Evaluation: Home with [facility] Home care services

## 2024-08-13 NOTE — PROGRESS NOTES
"   08/13/24 1026   Appointment Info   Signing Clinician's Name / Credentials (PT) Flavia Verdin, PT   Living Environment   People in Home alone   Current Living Arrangements independent living facility   Home Accessibility no concerns   Living Environment Comments sister lives across the parking lot so assisting some but pt states sister has been getting \"burnt out\"   Self-Care   Equipment Currently Used at Home shower chair;grab bar, toilet;grab bar, tub/shower;walker, rolling  (bedrail)   Fall history within last six months yes   Number of times patient has fallen within last six months 2  (1 prior to admission)   Activity/Exercise/Self-Care Comment indep with mobility, has been using 4WW to walk, has not left apartment since fall due to pain. pt typically indep with ADL's. has assist for cleaning, groceries.   General Information   Onset of Illness/Injury or Date of Surgery 08/12/24   Referring Physician Emeka Zamora PA-C   Patient/Family Therapy Goals Statement (PT) return home, open to home care at discharge, does not want TCU   Pertinent History of Current Problem (include personal factors and/or comorbidities that impact the POC) Gail Cuba is a 84 year old female with past medical hx of Afib not on AC, possible parkinsons, HLD, anxiety, depression who presents on 8/12/2024 with a fall 3 weeks PTA, now with sacral fracture. Also found to have subacute CVA.   Existing Precautions/Restrictions fall   Cognition   Affect/Mental Status (Cognition) WFL   Follows Commands (Cognition) WFL   Pain Assessment   Patient Currently in Pain Yes, see Vital Sign flowsheet   Range of Motion (ROM)   Range of Motion ROM is WFL   Strength (Manual Muscle Testing)   Strength Comments general LE weakness from reduced mobility   Bed Mobility   Comment, (Bed Mobility) supine>sit with SBA, use of bedrail   Transfers   Comment, (Transfers) sit>stand from EOB with 2WW and SBA   Gait/Stairs (Locomotion)   [place] Level " (Gait) supervision   Assistive Device (Gait) walker, front-wheeled   Distance in Feet (Gait) 25   Pattern (Gait) step-through   Deviations/Abnormal Patterns (Gait) gait speed decreased   Balance   Balance Comments hx of 2 falls, uses 4WW for mobility   Clinical Impression   Criteria for Skilled Therapeutic Intervention Yes, treatment indicated   PT Diagnosis (PT) impaired funcitonal mobility   Influenced by the following impairments LE weakness, reduced activity tolerance, impaired balance   Functional limitations due to impairments impaired transfers, gait   Clinical Presentation (PT Evaluation Complexity) stable   Clinical Presentation Rationale clinical reasoning   Clinical Decision Making (Complexity) low complexity   Planned Therapy Interventions (PT) balance training;gait training;home exercise program;patient/family education;strengthening;transfer training;progressive activity/exercise;risk factor education;home program guidelines   Risk & Benefits of therapy have been explained evaluation/treatment results reviewed;care plan/treatment goals reviewed;risks/benefits reviewed;current/potential barriers reviewed;participants voiced agreement with care plan;participants included;patient   PT Total Evaluation Time   PT Eval, Low Complexity Minutes (78859) 10   Physical Therapy Goals   PT Frequency 5x/week   PT Predicted Duration/Target Date for Goal Attainment 08/20/24   PT Goals Transfers;Gait   PT: Transfers Supervision/stand-by assist;Bed to/from chair;Assistive device   PT: Gait Supervision/stand-by assist;Standard walker;100 feet   Interventions   Interventions Quick Adds Therapeutic Activity   Therapeutic Activity   Therapeutic Activities: dynamic activities to improve functional performance Minutes (35948) 10   Symptoms Noted During/After Treatment Fatigue   Treatment Detail/Skilled Intervention cues for hand placement with sit>stand from EOB to stand with less assist/pain. good balance and safety with  walking in/out of bathroom, slower pace due to pain. edu on walking to/from bathroom with nursing staff over use of commode, pt in agreement, wrote on board in room as well. remains in chair, chair alarm on and call light in reach.   PT Discharge Planning   PT Plan Tues 1/5; progress gait with 2WW, LE strengthening/endurance   PT Discharge Recommendation (DC Rec) home with assist;home with home care physical therapy   PT Rationale for DC Rec rec return home with home care PT to increase indep, ease, and safety in own environment   PT Brief overview of current status amb 25 feet with 2WW and SBA   PT Equipment Needed at Discharge   (has all equipment)   Total Session Time   Timed Code Treatment Minutes 10   Total Session Time (sum of timed and untimed services) 20

## 2024-08-13 NOTE — DISCHARGE INSTRUCTIONS
Apixaban (By mouth)  Apixaban (a-PIX-a-ban)  Treats and prevents blood clots. This medicine is a blood thinner.  Brand Name(s):Eliquis,Eliquis 30 Day DVT/PE Starter Pack  There may be other brand names for this medicine.  When This Medicine Should Not Be Used:  This medicine is not right for everyone. Do not use it if you had an allergic reaction to apixaban or you have active bleeding.  How to Use This Medicine:  Tablet  Your doctor will tell you how much medicine to use. Do not use more than directed.   If you are not able to swallow the tablets whole, they may be crushed and mixed in water, 5% dextrose in water (D5W), apple juice, or applesauce. The crushed tablets may be mixed with 60 mL of water or D5W dose and given through a nasogastric tube (NGT).  This medicine should come with a Medication Guide. Ask your pharmacist for a copy if you do not have one.   Missed dose: Take a dose as soon as you remember. If it is almost time for your next dose, wait until then and take a regular dose. Do not take extra medicine to make up for a missed dose.   Store the medicine in a closed container at room temperature, away from heat, moisture, and direct light.   Drugs and Foods to Avoid:  Ask your doctor or pharmacist before using any other medicine, including over-the-counter medicines, vitamins, and herbal products.  Some medicines can affect how apixaban works. Tell your doctor if you are using any of the following:     Carbamazepine, itraconazole, ketoconazole, phenytoin, rifampin, ritonavir, Noemy's wort  Blood thinner (including clopidogrel, heparin, prasugrel, warfarin)  Medicine to treat depression  NSAID pain or arthritis medicine (including aspirin, celecoxib, diclofenac, ibuprofen, naproxen)  Warnings While Using This Medicine:  Tell your doctor if you are pregnant or breastfeeding, or if you have kidney disease, liver disease, bleeding problems, antiphospholipid syndrome, or an artificial heart valve.  Do not  stop using this medicine suddenly without asking your doctor. You might have a higher risk of stroke for a short time after you stop using this medicine.  This medicine increases your risk for bleeding that can become serious if not controlled. You may also bruise easily, and it may take longer than usual for bleeding to stop.  This medicine may increase your risk for a hematoma (blood clot) in your spine or back if you undergo an epidural or spinal puncture. This could lead to paralysis. Tell your doctor if you ever had spine problems or back surgery.  Tell any doctor or dentist who treats you that you are using this medicine. With your doctor's supervision, you may need to stop using this medicine several days before you have surgery or medical tests.  Your doctor will do lab tests at regular visits to check on the effects of this medicine. Keep all appointments.   Keep all medicine out of the reach of children. Never share your medicine with anyone.   Possible Side Effects While Using This Medicine:  Call your doctor right away if you notice any of these side effects:  Allergic reaction: Itching or hives, swelling in your face or hands, swelling or tingling in your mouth or throat, chest tightness, trouble breathing  Change in how much or how often you urinate, red or pink urine  Chest pain, trouble breathing  Coughing up blood, vomiting blood or material that looks like coffee grounds  Numbness, tingling, or muscle weakness in your legs or feet  Red or black, tarry stools  Unusual bleeding, bruising, or weakness  If you notice other side effects that you think are caused by this medicine, tell your doctor.  Call your doctor for medical advice about side effects. You may report side effects to FDA at 3-989-FDA-7042

## 2024-08-13 NOTE — PROGRESS NOTES
Patient keeps having short sinus pauses on tele.  Will hold BB for now and can consider cards consult if not improving

## 2024-08-13 NOTE — PLAN OF CARE
Goal Outcome Evaluation: Progressing   Problem: Adult Inpatient Plan of Care  Goal: Optimal Comfort and Wellbeing  Intervention: Monitor Pain and Promote Comfort  Recent Flowsheet Documentation  Taken 8/13/2024 0800 by Abimbola Ledesma, RN  Pain Management Interventions:   declines   medication (see MAR)          Alert and oriented. Pain controlled with Tylenol and lidocaine patch.   Neuro checks within normal limits. Up with assist of one, walker and gait belt.

## 2024-08-13 NOTE — CONSULTS
Doesn't need aspirin and apixaban           [facility]    Stroke Consult Note    Reason for Consult:  Stroke    Chief Complaint: Generalized Weakness and Irregular Heart Beat       HPI  Gail Cuba is a 84 year old female with PMHx significant for HLD and afib not previously on AC who presented to the ED 8/12 with weakness and pain after a fall she suffered a few weeks ago. She also reported that she hit her head for which a head CT was obtained and incidentally showed a subacute right corpus striatum infarct which was then confirmed on MRI.   At baseline, she uses a cane or walker for ambulation. She lives alone and performs ADLs independently. She reports that she was driving before this most recent fall. Family members were present in the room for her telestroke visit today and asked why she wasn't previously on anticoagulation for her afib which I don't know the answer to; I suspect it was due to her frequent falls or her [id number] score was too low.   She does not have any focal deficits on exam today. Aside from low back/buttock pain from her fall, she is at her baseline.     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  #Atrial fibrillation, [id number] score 5    Recommendations   - Neurochecks and Vital Signs every 4 hours    - normotension; long term BP goal 130/80  - apixaban for afib and secondary stroke prevention   - does not require antiplatelet therapy in addition  "from a stroke perspective   - LDL 75 and stroke etiology is cardioembolism, defer initiation of statin   - Telemetry  - Bedside Glucose Monitoring  - Nutrition: Mediterranean diet recommended   - PT/OT/SLP  - Stroke Education  - Depression Screen  - Apnea screening questions  - Euthermia, Euglycemia    Patient Follow-up    - in the next 1-2 week(s) with PCP  - in 6-8 weeks with general neurology or stroke ANA MAR[facility]A ([phone])    Thank you for this consult. No further stroke evaluation is recommended, so we will sign off. Please contact us with any additional questions.    Ksenia Davis NP  Vascular Neurology    To page me or covering stroke neurology team member, click here: [facility]  Choose \"On Call\" tab at top, then select \"NEUROLOGY/ALL SITES\" from middle drop-down box, press Enter, then look for \"stroke\" or \"telestroke\" for your site.  _____________________________________________________    Clinically Significant Risk Factors Present on Admission          # Hypocalcemia: Lowest Ca = 8.4 mg/dL in last 2 days, will monitor and replace as appropriate        # Coagulation Defect: INR = 1.21 (Ref range: 0.85 - 1.15) and/or PTT = N/A, will monitor for bleeding  # Drug Induced Platelet Defect: home medication list includes an antiplatelet medication               # Financial/Environmental Concerns: none               Past Medical History    Past Medical History:   Diagnosis Date    Anxiety     Breast cancer (H) 2009    Right breast cancer    Breast mass     Chest pain     Hypertension     MEDICAL HISTORY OF - 6-08    Rt breast cancer treated with radiation    Urge incontinence      Medications   Home Meds  Prior to Admission medications    Medication Sig Start Date End Date Taking? Authorizing Provider   acetaminophen (TYLENOL) 500 MG tablet Take 500 mg by mouth nightly as needed for pain   Yes Reported, Patient   ASPIRIN 81 MG OR TABS 1 TABLET DAILY   Yes Reported, Patient   carbidopa-levodopa (SINEMET)  MG " tablet Take 1 tablet by mouth 2 times daily 6/11/24  Yes Aisha Hull MD   escitalopram (LEXAPRO) 10 MG tablet Take 1.5 tablets (15 mg) by mouth daily 1/12/24  Yes Aisha Hull MD   ibuprofen (ADVIL/MOTRIN) 200 MG tablet Take 200-300 mg by mouth nightly as needed for pain   Yes Reported, Patient   Multiple Vitamins-Minerals (MULTIVITAMIN ADULTS PO) Take by mouth daily   Yes Reported, Patient       Scheduled Meds  Current Facility-Administered Medications   Medication Dose Route Frequency Provider Last Rate Last Admin    acetaminophen (TYLENOL) tablet 975 mg  975 mg Oral Q6H Emeka Zamora PA-C   975 mg at 08/13/24 1003    apixaban ANTICOAGULANT (ELIQUIS) tablet 2.5 mg  2.5 mg Oral BID Emeka Zamora PA-C   2.5 mg at 08/13/24 0800    [Held by provider] aspirin EC tablet 81 mg  81 mg Oral Daily Emeka Zamora PA-C   81 mg at 08/13/24 0800    carbidopa-levodopa (SINEMET)  MG per tablet 1 tablet  1 tablet Oral BID Emeka Zamora PA-C   1 tablet at 08/13/24 0800    escitalopram (LEXAPRO) tablet 15 mg  15 mg Oral Daily Emeka Zamora PA-C   15 mg at 08/13/24 0800    Lidocaine (LIDOCARE) 4 % Patch 1 patch  1 patch Transdermal Q24h Emeka Zamora PA-C   1 patch at 08/12/24 2220    metoprolol tartrate (LOPRESSOR) half-tab 12.5 mg  12.5 mg Oral BID Zion Perez MD        sodium chloride (PF) 0.9% PF flush 3 mL  3 mL Intracatheter Q8H Emeka Zamora PA-C   3 mL at 08/12/24 2222       Infusion Meds  Current Facility-Administered Medications   Medication Dose Route Frequency Provider Last Rate Last Admin    No anticoagulants IF patient has had acute trauma/surgery or recent intracranial, GI or urinary tract bleeding.    Other DOES NOT GO TO Emeka Hardy PA-C        Patient is already receiving anticoagulation with heparin, enoxaparin (LOVENOX), warfarin (COUMADIN)  or other anticoagulant medication   Does not apply Continuous PRN Emeka Zamora PA-C        sodium  chloride 0.9 % infusion  1,000 mL Intravenous Continuous Emeka Zamora PA-C 75 mL/hr at 08/13/24 1004 1,000 mL at 08/13/24 1004       Allergies   Allergies   Allergen Reactions    Latex      Blistery rash from gloves    Nkda [No Known Drug Allergy]           PHYSICAL EXAMINATION   Temp:  [97.4  F (36.3  C)-98  F (36.7  C)] 98  F (36.7  C)  Pulse:  [] 51  Resp:  [8-33] 16  BP: (108-153)/() 126/76  SpO2:  [90 %-98 %] 94 %    Neuro Exam  Mental Status:  alert, oriented x 3, follows commands, speech clear and fluent, naming and repetition normal  Cranial Nerves:  visual fields intact (tested by nurse), EOMI with normal smooth pursuit, facial sensation intact and symmetric (tested by nurse), facial movements symmetric, hearing not formally tested but intact to conversation, no dysarthria, shoulder shrug equal bilaterally, tongue protrusion midline  Motor:  no abnormal movements, able to move all limbs antigravity spontaneously with no signs of hemiparesis observed, no pronator drift  Reflexes:  unable to test (telestroke)  Sensory:  light touch sensation intact and symmetric throughout upper and lower extremities (assessed by nurse), no extinction on double simultaneous stimulation (assessed by nurse)  Coordination:  normal finger-to-nose and heel-to-shin bilaterally without dysmetria  Station/Gait:  unable to test due to telestroke    Stroke Scales    NIHSS  1a. Level of Consciousness 0-->Alert, keenly responsive   1b. LOC Questions 0-->Answers both questions correctly   1c. LOC Commands 0-->Performs both tasks correctly   2.   Best Gaze 0-->Normal   3.   Visual 0-->No visual loss   4.   Facial Palsy 0-->Normal symmetrical movements   5a. Motor Arm, Left 0-->No drift, limb holds 90 (or 45) degrees for full 10 secs   5b. Motor Arm, Right 0-->No drift, limb holds 90 (or 45) degrees for full 10 secs   6a. Motor Leg, Left 0-->No drift, leg holds 30 degree position for full 5 secs   6b. Motor Leg, right  0-->No drift, leg holds 30 degree position for full 5 secs   7.   Limb Ataxia 0-->Absent   8.   Sensory 0-->Normal, no sensory loss   9.   Best Language 0-->No aphasia, normal   10. Dysarthria 0-->Normal   11. Extinction and Inattention  0-->No abnormality   Total 0 (08/13/24 1201)       Imaging  I personally reviewed all imaging; relevant findings per HPI.    Labs Data   CBC  Recent Labs   Lab 08/13/24  0516 08/12/24  1652   WBC 3.8* 5.7   RBC 4.01 4.38   HGB 11.6* 12.6   HCT 37.0 39.0    293     Basic Metabolic Panel   Recent Labs   Lab 08/13/24  1249 08/13/24  0811 08/13/24  0758 08/13/24  0516 08/12/24  2205 08/12/24  1652   NA  --   --   --  141  --  142   POTASSIUM  --   --   --  3.5  --  3.8   CHLORIDE  --   --   --  107  --  108*   CO2  --   --   --  25  --  25   BUN  --   --   --  12.0  --  14.7   CR  --   --   --  0.62  --  0.54   * 97 97 96   < > 115*   CHRISTINE  --   --   --  8.4*  --  8.9    < > = values in this interval not displayed.     Liver Panel  Recent Labs   Lab 08/12/24  1652   PROTTOTAL 6.3*   ALBUMIN 3.8   BILITOTAL 0.5   ALKPHOS 145   AST 23   ALT 6     INR    Recent Labs   Lab Test 08/12/24  1654   INR 1.21*           Stroke Consult Data Data   Telestroke Service Details  (for non-emergent stroke consult with tele)  Video start time 08/13/24   1200   Video end time 08/13/24   1229   Type of service telemedicine diagnostic assessment of acute neurological changes   Reason telemedicine is appropriate patient requires assessment with a specialist for diagnosis and treatment of neurological symptoms   Mode of transmission secure interactive audio and video communication per [place]   Originating site (patient location) [facility]    Distant site (provider location) [facility]       I have personally spent a total of 40 minutes providing care today, time spent in reviewing medical records and devising the plan as recorded  above.

## 2024-08-13 NOTE — PHARMACY
Pharmacy Consult to evaluate for medication related stroke core measures    Gail Cuba, 84 year old female admitted for generalized weakness, irregular heart beat on 8/12/2024.    Thrombolytic was not given because of Time from onset contraindications    VTE Prophylaxis  apixaban given on 8/13/24 as appropriate prior to end of hospital day 2.    Antithrombotic: aspirin started on 8/12/24, as appropriate by end of hospital day 2. Continue antithrombotic therapy on discharge to meet quality measures, unless contraindicated.    Anticoagulation if history of A-fib/flutter: Patient on apixaban (Eliquis); continue anticoagulation on discharge to meet quality measures, unless contraindicated.    LDL Cholesterol Calculated   Date Value Ref Range Status   08/12/2024 75 <=100 mg/dL Final   07/19/2019 118 (H) <100 mg/dL Final     Comment:     Above desirable:  100-129 mg/dl  Borderline High:  130-159 mg/dL  High:             160-189 mg/dL  Very high:       >189 mg/dl         Statin not ordered, LDL 75.    Recommendations: None at this time    Thank you for the consult.    Keiry Malloy RPH 8/13/2024 1:36 PM

## 2024-08-13 NOTE — PROGRESS NOTES
Speech Therapy    Per chart review and discussion with nurse, Pt presents with no skilled ST needs at this time. Will defer evaluation. Please enter new order if concerns arise.

## 2024-08-13 NOTE — CONSULTS
Care Management Initial Consult    General Information  Assessment completed with: Gail Younger  Type of CM/SW Visit: Initial Assessment    Primary Care Provider verified and updated as needed: Yes   Readmission within the last 30 days: no previous admission in last 30 days      Reason for Consult: discharge planning  Advance Care Planning:            Communication Assessment  Patient's communication style: spoken language (English or Bilingual)    Hearing Difficulty or Deaf: no   Wear Glasses or Blind: other (see comments) (Wears cheaters)    Cognitive  Cognitive/Neuro/Behavioral: WDL                      Living Environment:   People in home: alone     Current living Arrangements: apartment      Able to return to prior arrangements: yes       Family/Social Support:  Care provided by:  (sister has been helping the past few days/week)  Provides care for: no one  Marital Status: Single  Children, Sibling(s)          Description of Support System: Supportive, Involved    Support Assessment: Adequate family and caregiver support, Adequate social supports    Current Resources:   Patient receiving home care services: No     Community Resources: None  Equipment currently used at home: shower chair, grab bar, toilet, grab bar, tub/shower, walker, rolling (bed rail.)  Supplies currently used at home: None    Employment/Financial:  Employment Status: retired        Financial Concerns: none   Referral to Financial Worker: No       Does the patient's insurance plan have a 3 day qualifying hospital stay waiver?  Yes     Which insurance plan 3 day waiver is available? Alternative insurance waiver    Will the waiver be used for post-acute placement? No    Lifestyle & Psychosocial Needs:  Social Determinants of Health     Food Insecurity: Not on file   Depression: Not at risk (1/12/2024)    PHQ-2     PHQ-2 Score: 0   Housing Stability: Not on file   Tobacco Use: Low Risk  (6/11/2024)    Patient History     Smoking Tobacco Use:  Never     Smokeless Tobacco Use: Never     Passive Exposure: Not on file   Financial Resource Strain: Not on file   Alcohol Use: Not on file   Transportation Needs: Not on file   Physical Activity: Not on file   Interpersonal Safety: Not on file   Stress: Not on file   Social Connections: Not on file   Health Literacy: Not on file     Functional Status:  Prior to admission patient needed assistance:   Dependent ADLs:: Ambulation-walker (only since fall)  Dependent IADLs:: Independent     Mental Health Status:  Mental Health Status: No Current Concerns       Chemical Dependency Status:  Chemical Dependency Status: No Current Concerns       Values/Beliefs:  Spiritual, Cultural Beliefs, Yazdanism Practices, Values that affect care: no             Care Management Discharge Note    Discharge Date: 08/13/2024     Discharge Disposition: Home Care    Discharge Services: Home Care    Discharge DME: None    Discharge Transportation: family or friend will provide    Private pay costs discussed: Not applicable    Does the patient's insurance plan have a 3 day qualifying hospital stay waiver?  Yes     Which insurance plan 3 day waiver is available? Alternative insurance waiver    Will the waiver be used for post-acute placement? No    PAS Confirmation Code:  NA  Patient/family educated on Medicare website which has current facility and service quality ratings: yes    Education Provided on the Discharge Plan: Yes  Persons Notified of Discharge Plans: Patient  Patient/Family in Agreement with the Plan: yes    Handoff Referral Completed: Yes    Additional Information:    Automatic CM referral for discharge planning as pt had a fall at home & new stroke protocol.     SW reviewed EMR, attended IDT rounds, and then met with pt to introduce self title, role, and perform assessment.     Per pt, prior to hospitalization, she lives in an apartment & has been independent with her I/ADLS, using a rolling walker for ambulation, and has  support from her sister & son, both live locally.    PT/OT recommending Home Care services for safe discharge from hospital, and pt in agreement with cares.     SW educated on Medicare.gov, how insurance pays for services post hospitalization, and home bound criteria for home care services.  Pt agreeable to have referrals sent to the home care hub.      Pt is accepted for cares via [facility] (Phone: [phone]) for RN, PT/OT & HHA services.     Transportation discussed & family will transport home at discharge.    SW requested [facility] make follow up hospital appointment, which will show in AVS.    LAURO Fairbanks

## 2024-08-13 NOTE — PROGRESS NOTES
Stroke Education Note    The following information was reviewed with patient and family:    - Activation of emergency medical system (when to call 911)    - Individualized risk factors for stroke:      high blood pressure     atrial fibrillation    - Warning signs and symptoms of stroke:   B = Balance loss   E = Eyesight changes   F = Facial droop or numbness   A = Arm or leg weakness   S = Speech difficulty, slurred speech   T = Time to call 911 for help    Written stroke educational materials given:   - Learning about BE FAST: Stroke Warning Signs and Learning about Risk Factors for Stroke (Healthwise)   - Understanding Stroke: Key Resources After a Stroke (FOD #100402)    Learner's response to education:     commitment to change     Abimbola Ledesma RN

## 2024-08-13 NOTE — PLAN OF CARE
Problem: Risk for Delirium  Goal: Improved Sleep  Outcome: Progressing   Goal Outcome Evaluation:  Pt is A&O x4 and able to make needs known.   Has had frequent pauses on Tele rhythm and provider was notified. Beta Blocker was put on hold.   Pt been resting comfortably with no complaints of pain during night.

## 2024-08-13 NOTE — ED NOTES
[facility]   Admission Handoff    The patient is Gail Cuba, 84 year old who arrived in the ED by CAR from home with a complaint of Generalized Weakness and Irregular Heart Beat  . The patient's current symptoms are a recurrence of a past episode and during this time the symptoms have decreased. In the ED, patient was diagnosed with   Final diagnoses:   Rapid atrial fibrillation (H) - asymptomatic. Prior care for rapid atrial fibrillation- 2/21/24. Not anticogulated. Previously on metoprolol   Abnormal CT scan, pelvis - IMPRESSION: 1.  Acute comminuted sacral fracture with minimal displacement and neural foraminal extension.   Abnormal head CT - IMPRESSION: 1.  A nonhemorrhagic likely subacute right basal ganglia infarct with mild regional mass effect. No midline shift or hydrocephalus         Needed?: No    Allergies:    Allergies   Allergen Reactions    Latex      Blistery rash from gloves    Nkda [No Known Drug Allergy]        Past Medical Hx:   Past Medical History:   Diagnosis Date    Anxiety     Breast cancer (H) 2009    Right breast cancer    Breast mass     Chest pain     Hypertension     MEDICAL HISTORY OF - 6-08    Rt breast cancer treated with radiation    Urge incontinence        Initial vitals were: BP: (!) 140/131  Pulse: (!) 149  Temp: 97.8  F (36.6  C)  Resp: 20  Weight: 53.5 kg (118 lb)  SpO2: 97 %   Recent vital Signs: BP (!) 119/92   Pulse 80   Temp 97.8  F (36.6  C) (Oral)   Resp 24   Wt 53.5 kg (118 lb)   SpO2 93%   BMI 20.57 kg/m      Elimination Status: Continent: Yes     Activity Level: 2 assist    Fall Status: Reason for falls risk:  Mobility  nonskid shoes/slippers when out of bed, arm band in place, patient and family education, assistive device/personal items within reach, and activity supervised    Baseline Mental status: WDL  Current Mental Status changes: at basesline    Infection present or suspected this encounter: no  Sepsis  suspected: No    Isolation type: n/a    Bariatric equipment needed?: No    In the ED these meds were given:   Medications   sodium chloride 0.9 % infusion (has no administration in time range)   diltiazem (CARDIZEM) injection 10 mg (10 mg Intravenous $Given 8/12/24 8438)   sodium chloride 0.9% BOLUS 500 mL (0 mLs Intravenous Stopped 8/1940)       Drips running?  No    Home pump  No    Current LDAs: Peripheral IV: Site right forearm; Gauge 20g  normal saline     Results:   Labs/Imaging  Ordered and Resulted from Time of ED Arrival Up to the Time of Departure from the ED  Results for orders placed or performed during the hospital encounter of 08/12/24 (from the past 24 hour(s))   CBC with platelets differential    Narrative    The following orders were created for panel order CBC with platelets differential.  Procedure                               Abnormality         Status                     ---------                               -----------         ------                     CBC with platelets and d...[[id number]]                      Final result                 Please view results for these tests on the individual orders.   Basic metabolic panel   Result Value Ref Range    Sodium 142 135 - 145 mmol/L    Potassium 3.8 3.4 - 5.3 mmol/L    Chloride 108 (H) 98 - 107 mmol/L    Carbon Dioxide (CO2) 25 22 - 29 mmol/L    Anion Gap 9 7 - 15 mmol/L    Urea Nitrogen 14.7 8.0 - 23.0 mg/dL    Creatinine 0.54 0.51 - 0.95 mg/dL    GFR Estimate 90 >60 mL/min/1.73m2    Calcium 8.9 8.8 - 10.4 mg/dL    Glucose 115 (H) 70 - 99 mg/dL   CRP inflammation   Result Value Ref Range    CRP Inflammation 7.61 (H) <5.00 mg/L   Nt probnp inpatient (BNP)   Result Value Ref Range    N terminal Pro BNP Inpatient 2,789 (H) 0 - 1,800 pg/mL   CBC with platelets and differential   Result Value Ref Range    WBC Count 5.7 4.0 - 11.0 10e3/uL    RBC Count 4.38 3.80 - 5.20 10e6/uL    Hemoglobin 12.6 11.7 - 15.7 g/dL    Hematocrit 39.0 35.0 - 47.0 %     MCV 89 78 - 100 fL    MCH 28.8 26.5 - 33.0 pg    MCHC 32.3 31.5 - 36.5 g/dL    RDW 13.1 10.0 - 15.0 %    Platelet Count 293 150 - 450 10e3/uL    % Neutrophils 69 %    % Lymphocytes 19 %    % Monocytes 10 %    % Eosinophils 2 %    % Basophils 1 %    % Immature Granulocytes 0 %    NRBCs per 100 WBC 0 <1 /100    Absolute Neutrophils 3.9 1.6 - 8.3 10e3/uL    Absolute Lymphocytes 1.1 0.8 - 5.3 10e3/uL    Absolute Monocytes 0.6 0.0 - 1.3 10e3/uL    Absolute Eosinophils 0.1 0.0 - 0.7 10e3/uL    Absolute Basophils 0.0 0.0 - 0.2 10e3/uL    Absolute Immature Granulocytes 0.0 <=0.4 10e3/uL    Absolute NRBCs 0.0 10e3/uL   Chunky Draw    Narrative    The following orders were created for panel order Chunky Draw.  Procedure                               Abnormality         Status                     ---------                               -----------         ------                     Extra Blue Top Tube[[id number]]                              Final result               Extra Red Top Tube[[id number]]                               Final result                 Please view results for these tests on the individual orders.   Extra Blue Top Tube   Result Value Ref Range    Hold Specimen jic    Extra Red Top Tube   Result Value Ref Range    Hold Specimen jic    CT Pelvis Bone wo Contrast    Narrative    EXAM: CT PELVIS BONE WO CONTRAST  LOCATION: [facility]  DATE: 8/12/2024    INDICATION: Unwitnessed fall out of bed  3 weeks prior. Right sacral pain. Evaluate for acute bony process after blunt trauma.  COMPARISON: None.  TECHNIQUE: CT scan of the pelvis was performed without IV contrast. Multiplanar reformats were obtained. Dose reduction techniques were used.  CONTRAST: None.    FINDINGS:    BONES AND JOINTS:  Acute comminuted fracture of the bilateral sacral ala at the level of S1 and S2 anteriorly with minimal displacement and impaction. There is fracture extension to the bilateral S1 neural foramina,  and possible S2 neural foramina. There is normal joint   alignment. Mild bilateral hip and sacroiliac joint degenerative changes. Severe degenerative changes of the lower lumbar spine. Osteopenia.    SOFT TISSUES:  Mild presacral fat stranding. No discrete fluid collection. Diffuse muscle atrophy. Atherosclerosis. Colonic diverticulosis.      Impression    IMPRESSION:  1.  Acute comminuted sacral fracture with minimal displacement and neural foraminal extension.  2.  Other ancillary findings as described above.     Head CT w/o contrast    Narrative    EXAM: CT HEAD W/O CONTRAST  LOCATION: [facility]  DATE: 8/12/2024    INDICATION: Unwitnessed fall out of bed 3 weeks prior. Advanced age. Evaluate for acute intracranial process.  COMPARISON: CT dated 2/21/2024   TECHNIQUE: Routine CT Head without IV contrast. Multiplanar reformats. Dose reduction techniques were used.    FINDINGS:  INTRACRANIAL CONTENTS: No acute intracranial hemorrhage, extra-axial fluid collection, or mass effect. Again noted subtle mineralization of the mediodorsal thalami. No evidence of an acute transcortical confluent infarct. No present on the prior exam, a   well-circumscribed hypodensity involving the right caudate head and putamen with mild regional mass effect partially effacing the frontal horn of the right lateral ventricle. No interval ventriculomegaly or midline shift. Patent basal cisterns. Grossly   similar patchy and confluent bilateral cerebral white matter hypodensities, nonspecific although most likely the sequela of chronic microvascular ischemia. Moderate to advanced generalized cerebral and mild cerebellar parenchymal volume loss.     VISUALIZED ORBITS/SINUSES/MASTOIDS: No acute intraorbital finding. No evidence of significant paranasal sinus or mastoid mucosal disease.     BONES/SOFT TISSUES: No acute calvarial injury or significant scalp hematoma.      Impression    IMPRESSION:  1.  A  nonhemorrhagic likely subacute right basal ganglia infarct with mild regional mass effect. No midline shift or hydrocephalus.  2.  No evidence of acute traumatic brain injury.  3.  Chronic age-related changes, as described.       For the majority of the shift this patient's behavior was Green     Cardiac Rhythm: Atrial rhythm  Pt needs tele? No  Skin/wound Issues: None    Code Status: Full Code    Pain control: good    Nausea control: pt had none    Abnormal labs/tests/findings requiring intervention: sacral fracture    Patient tested for COVID 19 prior to admission: NO     OBS brochure/video discussed/provided to patient/family: Yes     Family present during ED course? Yes     Family Comments/Social Situation comments: n/a    Tasks needing completion: None    Betty Landon RN

## 2024-08-13 NOTE — PROGRESS NOTES
[place] [facility] DISCHARGE NOTE    Patient discharged to home at 4:37 PM via wheel chair. Accompanied by son and staff. Discharge instructions reviewed with patient and son, opportunity offered to ask questions. Prescriptions sent to patients preferred pharmacy. All belongings sent with patient.    Abimbola Ledesma RN

## 2024-08-13 NOTE — CONSULTS
"  [facility]    Stroke Telephone Note    I was called by Yandel Arthur on 08/12/24 regarding patient Gail Cuba. The patient is a 84 year old female with who comes in for the evaluation of weakness and coccyx pain after a fall 3 weeks ago. Also reported that she hit her head when she fell for which CT head was obtained. She was found to have acute comminuted sacral fracture and subacute right basal ganglia infarct. In ED she was noticed to be in Afib with RVR    PMH of atrial fibrillation not on anticoagulation, HTN, being worked up in outpatient neurology for cognitive decline and gait instability (on sinemet trial to evaluate and diagnose Parkinson's disease)    Vitals  BP: 121/86   Pulse: 74   Resp: 24   Temp: 97.8  F (36.6  C)   Weight: 53.5 kg (118 lb)    Impression  R corpus striatum infarct    Recommendations    Acute stroke management  -Recommend getting MRI brain and MRA head and neck w wo contrast  - Neuro checks every 4 hours  - Normotensive BP goal from stroke standpoint  - PT/OT/ST consults    Diagnostic testing  - HbA1c, Lipid profile, Echo  - Continue telemetry while inpatient  - Await echocardiogram    Secondary stroke prevention  - Ok to continue home aspirin 81 mg for now. However will eventually need to be on anticoagulation preferably DOAC given stroke and atrial fibrillation. Will defer to primary team when safe to start anticoagulation given new fracture.     My recommendations are based on the information provided over the phone by Gail Cuba's in-person providers. They are not intended to replace the clinical judgment of her in-person providers. I was not requested to personally see or examine the patient at this time.     Kenyatta Arguello MD  Vascular Neurology    To page me or covering stroke neurology team member, click here: [facility]  Choose \"On Call\" tab at top, then select \"NEUROLOGY/ALL SITES\" from middle drop-down box, press Enter, then look for " "\"stroke\" or \"telestroke\" for your site.    "

## 2024-08-13 NOTE — PROGRESS NOTES
08/13/24 1041   Appointment Info   Signing Clinician's Name / Credentials (OT) Sussy Arabella, OTR/L   Living Environment   People in Home alone   Current Living Arrangements independent living facility   Home Accessibility no concerns   Living Environment Comments sister lives across the parking lot but unable to physically assist.   Self-Care   Equipment Currently Used at Home shower chair;grab bar, toilet;grab bar, tub/shower;walker, rolling  (bed rail.)   Fall history within last six months yes   Number of times patient has fallen within last six months 2   Activity/Exercise/Self-Care Comment at baseline: independent with ADLs and related mobility using 4WW. Since fall 3 weeks ago hasn't been able to do as much walking, however has been able to complete ADLs with increased struggle. 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   General Information   Onset of Illness/Injury or Date of Surgery 08/12/24   Referring Physician Emeka Zamora PA-C   Patient/Family Therapy Goal Statement (OT) to return home. Does not want TCU   Additional Occupational Profile Info/Pertinent History of Current Problem Fall 3 weeks PTA while rolling out of bed. Fell on buttock and hit head on desk. Has had difficulty ambulating since then due to buttock pain. CT pelvis showed acute comminuted sacral fracture with extension into S1 and S2 neuroforamen. ER provider spoke with [facility] who did not recommend surgery. Incidentally found to have a subacute CVA and afib   Existing Precautions/Restrictions fall   Cognitive Status Examination   Orientation Status orientation to person, place and time   Sensory   Sensory Quick Adds sensation intact   Pain Assessment   Patient Currently in Pain   (no pain supine. Increased with activity.)   Strength Comprehensive (MMT)   Comment, General Manual Muscle Testing (MMT) Assessment B UE strength: WNL and equal.   Coordination   Upper Extremity Coordination  No deficits were identified   Bed Mobility   Comment (Bed Mobility) modified independence   Transfers   Transfer Comments SBA   Balance   Balance Comments Ambulates to/from bathroom with SBA and FWW.   Activities of Daily Living   BADL Assessment/Intervention upper body dressing;lower body dressing;toileting   Upper Body Dressing Assessment/Training   Hessel Level (Upper Body Dressing) independent   Lower Body Dressing Assessment/Training   Hessel Level (Lower Body Dressing) supervision   Toileting   Hessel Level (Toileting) supervision   Clinical Impression   Criteria for Skilled Therapeutic Interventions Met (OT) Yes, treatment indicated   OT Diagnosis decreased independence with ADLs/IADLs.   OT Problem List-Impairments impacting ADL problems related to;pain   Assessment of Occupational Performance 1-3 Performance Deficits   Identified Performance Deficits increased difficulty with bathing and functional mobility   Planned Therapy Interventions (OT) ADL retraining   Clinical Decision Making Complexity (OT) problem focused assessment/low complexity   Risk & Benefits of therapy have been explained patient;participants included;participants voiced agreement with care plan;current/potential barriers reviewed;risks/benefits reviewed;care plan/treatment goals reviewed;evaluation/treatment results reviewed   OT Total Evaluation Time   OT Eval, Low Complexity Minutes (40307) 10   OT Goals   Therapy Frequency (OT) 5 times/week   OT Predicted Duration/Target Date for Goal Attainment 08/20/24   OT Goals Lower Body Dressing;Hygiene/Grooming;OT Goal 1;Toilet Transfer/Toileting   OT: Hygiene/Grooming supervision/stand-by assist;while standing   OT: Lower Body Dressing Modified independent  (trial AE)   OT: Toilet Transfer/Toileting Supervision/stand-by assist;Goal Met   Interventions   Interventions Quick Adds Self-Care/Home Management   Self-Care/Home Management   Self-Care/Home Mgmt/ADL, Compensatory, Meal  Prep Minutes (77310) 10   Symptoms Noted During/After Treatment (Meal Preparation/Planning Training) increased pain   Treatment Detail/Skilled Intervention Educated pt on IP OT role, POC and discharge recs. Pt observed to require cues to reach back to furniture prior to sitting (ie toilet, chair, EOB). Educated pt on importance of reaching back to assist with controlling stand to sit and to decrease fall risk- verbalizes understanding. Pt left seated in bedside chair with all needs within reach and chair alarm on.   OT Discharge Planning   OT Plan POC: Tues 1/5; standing g/h, trial AE for LB dressing   OT Discharge Recommendation (DC Rec) home with home care occupational therapy   OT Rationale for DC Rec Pt moving with SBA and FWW. Appropriate to return home with home OT to maximize independence/safety with ADLs/IADLs. Would benefit from HHA to assist with showers.   OT Brief overview of current status SBA and FWW.   Total Session Time   Timed Code Treatment Minutes 10   Total Session Time (sum of timed and untimed services) 20

## 2024-08-14 ENCOUNTER — PATIENT OUTREACH (OUTPATIENT)
Dept: CARE COORDINATION | Facility: CLINIC | Age: 84
End: 2024-08-14
Payer: COMMERCIAL

## 2024-08-14 NOTE — LETTER
[facility] CARE COORDINATION  [address] [facility] [address]     August 16, 2024    Gail Cuba  [address]  [facility] [id number]      Dear Gail,    I am a clinic care coordinator who works with Aisha Hull MD with the [facility]. I have been trying to reach you recently to introduce Clinic Care Coordination. Below is a description of clinic care coordination and how I can further assist you.       The clinic care coordination team is made up of a registered nurse, , financial resource worker and community health worker who understand the health care system. The goal of clinic care coordination is to help you manage your health and improve access to the health care system. Our team works alongside your provider to assist you in determining your health and social needs. We can help you obtain health care and community resources, providing you with necessary information and education. We can work with you through any barriers and develop a care plan that helps coordinate and strengthen the communication between you and your care team.  Our services are voluntary and are offered without charge to you personally.    Please feel free to contact me with any questions or concerns regarding care coordination and what we can offer.      We are focused on providing you with the highest-quality healthcare experience possible.    Sincerely,     [facility]   Ruby Fuentes RN, Care Coordinator   [facility]'s   E-mail [email or web address]   [phone]     Enclosed: I have enclosed a copy of a 24 Hour Access Plan. This has helpful phone numbers for you to call when needed. Please keep this in an easy to access place to use as needed.

## 2024-08-14 NOTE — LETTER
[facility]  Patient Centered Plan of Care  About Me:        Patient Name:  Gail Cash    YOB: 1940  Age:         84 year old   Liz MRN:    [id number] Telephone Information:  Home Phone [phone]   Mobile [phone]       Address:  [address] [place] [address] Email address:  [email or web address]      Emergency Contact(s)    Name Relationship Lgl Grd Work Phone Home Phone Mobile Phone   1. KAREN CASH Son    [phone]   2. JOSH CASH Son   [phone]   3. ARNOLDO WAN Sister   [phone]           Primary language:  English     needed? No   [employer]:  [phone] op. 1  Other communication barriers:No data recorded  Preferred Method of Communication:  Mail  Current living arrangement: No data recorded  Mobility Status/ Medical Equipment: Independent w/Device        Health Maintenance  Health Maintenance Reviewed:   Health Maintenance Due   Topic Date Due    RSV VACCINE (Pregnancy & 60+) (1 - 1-dose 60+ series) Never done    Pneumococcal Vaccine: 65+ Years (2 of 2 - PCV) 01/21/2010    ZOSTER IMMUNIZATION (2 of 3) 07/07/2016    DTAP/TDAP/TD IMMUNIZATION (4 - Td or Tdap) 01/21/2019    COVID-19 Vaccine (1 - 2023-24 season) Never done    MEDICARE ANNUAL WELLNESS VISIT  06/26/2024    FALL RISK ASSESSMENT  06/26/2024          My Access Plan  Medical Emergency 911   Primary Clinic Line [facility] - [phone]   24 Hour Appointment Line [phone] or  [phone]) (toll-free)   24 Hour Nurse Line [phone] (toll-free)   Preferred Urgent Care [facility], [phone]     [facility] [place], [place]  [phone]     Preferred Pharmacy [id number] - [place], [place] - [address]     Behavioral Health Crisis Line The National Suicide Prevention Lifeline at 1-451.332.2436 or  Text/Call 988           My Care Team Members  Patient Care Team         Relationship Specialty Notifications Start End    Aisha Hull MD PCP - General   7/8/23     Phone: [phone] Fax: [phone] [address] [place] [address]    ERINN Ricardo MD MD Urology  12/16/13     Phone: [phone] Pager: [phone] Fax: [phone] [address] [facility] [address]    John Michael MD MD Neurology  6/27/23     Phone: [phone] Fax: [phone] [address] [facility] [address]    Aisha Hull MD Assigned PCP   7/22/23     Phone: [phone] Fax: [phone] [address] [place] [address]    John Michael MD Assigned Neuroscience Provider   9/16/23     Phone: [phone] Fax: [phone] [address] [facility] [address]    Ruby Fuentes, RN Lead Care Coordinator Primary Care - CC Admissions 8/14/24     Phone: [phone]                     My Care Plans  Self Management and Treatment Plan    Care Plan      Action Plans on File:                       Advance Care Plans/Directives:   Advanced Care Plan/Directives on file:   No    Discussed with patient/caregiver(s): No data recorded           My Medical and Care Information  Problem List   Patient Active Problem List   Diagnosis    Personal history of malignant neoplasm of breast    THICKENING ENDOMETRIUM    Back pain    Hyperlipidemia LDL goal <130    Urge incontinence of urine    Anxiety    Endometrial mass    Osteoporosis    Recurrent major depressive disorder, in partial remission (H24)    Vitamin B12 deficiency (non anemic)    Abnormal head CT    Rapid atrial fibrillation (H)    Abnormal CT scan, pelvis    Cerebrovascular accident (H)    Fall    Closed fracture of sacrum (H)    Cerebrovascular accident (CVA) due to embolism of right middle cerebral artery (H)      Current Medications and Allergies:    Current Outpatient Medications   Medication Sig Dispense Refill     acetaminophen (TYLENOL) 500 MG tablet Take 500 mg by mouth nightly as needed for pain      apixaban ANTICOAGULANT (ELIQUIS) 2.5 MG tablet Take 1 tablet (2.5 mg) by mouth 2 times daily 60 tablet 0    carbidopa-levodopa (SINEMET)  MG tablet Take 1 tablet by mouth 2 times daily 180 tablet 3    escitalopram (LEXAPRO) 10 MG tablet Take 1.5 tablets (15 mg) by mouth daily 135 tablet 3    metoprolol tartrate (LOPRESSOR) 25 MG tablet Take 0.5 tablets (12.5 mg) by mouth 2 times daily 15 tablet 0    Multiple Vitamins-Minerals (MULTIVITAMIN ADULTS PO) Take by mouth daily       No current facility-administered medications for this visit.        Care Coordination Start Date: 8/14/2024   Frequency of Care Coordination: weekly, more frequently as needed     Form Last Updated: 08/16/2024

## 2024-08-14 NOTE — PROGRESS NOTES
Clinic Care Coordination Contact  [facility]/Voicemail    [facility]  Hospitalist Discharge Summary       Date of Admission:  8/12/2024  Date of Discharge:  8/13/2024  Discharging Provider: Zion Perez MD  Discharge Service: Hospitalist Service        Discharge Diagnoses  # Fall, initial encounter  # Sacral fracture  # Subacute CVA of right basal ganglia  # Atrial fibrillation with RVR  # Possible Parkinsons  # Hyperlipidemia LDL goal <130  # Anxiety  # Recurrent major depressive disorder, in partial remission (H24)       Clinical Data: Care Coordinator Outreach    Outreach Documentation Number of Outreach Attempt   8/14/2024  11:31 AM 1       Left message on patient's voicemail with call back information and requested return call.    Plan: . Care Coordinator will try to reach patient again in 1-2 business days.    [facility]   Ruby Fuentes RN, Care Coordinator   [facility]'s   E-mail [email or web address]   [phone]

## 2024-08-15 NOTE — PROGRESS NOTES
Clinic Care Coordination Contact  [facility]/Voicemail    Clinical Data: Care Coordinator Outreach    Outreach Documentation Number of Outreach Attempt   8/14/2024  11:31 AM 1   8/15/2024   1:16 PM 2       Left message on patient's voicemail with call back information and requested return call.    Plan: .   Care Coordinator will do no further outreaches at this time.  [facility] letter sent via My Chart     [facility]   Ruby Fuentes RN, Care Coordinator   [facility]'s   E-mail [email or web address]   [phone]

## 2024-08-16 ENCOUNTER — TELEPHONE (OUTPATIENT)
Dept: FAMILY MEDICINE | Facility: CLINIC | Age: 84
End: 2024-08-16
Payer: COMMERCIAL

## 2024-08-16 NOTE — TELEPHONE ENCOUNTER
Agree with orders as requested. Not only does patient have a fib, diagnosed with stroke, and should be on eliquis unless she has bleeding issues. Follow up with cardiology for further recommendations on eliquis.     Aisha Hull MD

## 2024-08-19 ENCOUNTER — MYC MEDICAL ADVICE (OUTPATIENT)
Dept: FAMILY MEDICINE | Facility: CLINIC | Age: 84
End: 2024-08-19
Payer: COMMERCIAL

## 2024-08-22 ENCOUNTER — OFFICE VISIT (OUTPATIENT)
Dept: FAMILY MEDICINE | Facility: CLINIC | Age: 84
End: 2024-08-22
Payer: COMMERCIAL

## 2024-08-22 ENCOUNTER — TELEPHONE (OUTPATIENT)
Dept: NEUROLOGY | Facility: CLINIC | Age: 84
End: 2024-08-22

## 2024-08-22 VITALS
DIASTOLIC BLOOD PRESSURE: 72 MMHG | HEART RATE: 78 BPM | RESPIRATION RATE: 14 BRPM | SYSTOLIC BLOOD PRESSURE: 118 MMHG | TEMPERATURE: 97.8 F | OXYGEN SATURATION: 98 %

## 2024-08-22 DIAGNOSIS — I48.91 RAPID ATRIAL FIBRILLATION (H): ICD-10-CM

## 2024-08-22 DIAGNOSIS — S32.10XD CLOSED FRACTURE OF SACRUM WITH ROUTINE HEALING, UNSPECIFIED PORTION OF SACRUM, SUBSEQUENT ENCOUNTER: ICD-10-CM

## 2024-08-22 DIAGNOSIS — Z86.73 HISTORY OF CVA (CEREBROVASCULAR ACCIDENT): ICD-10-CM

## 2024-08-22 DIAGNOSIS — Z09 HOSPITAL DISCHARGE FOLLOW-UP: Primary | ICD-10-CM

## 2024-08-22 PROCEDURE — 99495 TRANSJ CARE MGMT MOD F2F 14D: CPT | Performed by: STUDENT IN AN ORGANIZED HEALTH CARE EDUCATION/TRAINING PROGRAM

## 2024-08-22 RX ORDER — METOPROLOL TARTRATE 25 MG/1
12.5 TABLET, FILM COATED ORAL 2 TIMES DAILY
Qty: 90 TABLET | Refills: 3 | Status: SHIPPED | OUTPATIENT
Start: 2024-08-22

## 2024-08-22 ASSESSMENT — PAIN SCALES - GENERAL: PAINLEVEL: SEVERE PAIN (6)

## 2024-08-22 ASSESSMENT — PATIENT HEALTH QUESTIONNAIRE - PHQ9
SUM OF ALL RESPONSES TO PHQ QUESTIONS 1-9: 15
SUM OF ALL RESPONSES TO PHQ QUESTIONS 1-9: 15
10. IF YOU CHECKED OFF ANY PROBLEMS, HOW DIFFICULT HAVE THESE PROBLEMS MADE IT FOR YOU TO DO YOUR WORK, TAKE CARE OF THINGS AT HOME, OR GET ALONG WITH OTHER PEOPLE: EXTREMELY DIFFICULT

## 2024-08-22 NOTE — PATIENT INSTRUCTIONS
Get some crackers and a water bottle to keep on your bedside table so you can take the eliquis with a little snack.   Add some to do lists to the house  Add 1 capful (or packet) of miralax in your coffee  Have a small snack  Take eliquis   For bowels, we can always add a senna pill at bedtime to help move the bowels easier.  Doesn't matter how long it takes to do something, just doing something is helpful. Get up, move around as much as you're able to.   Call neurology to get on their schedule. They're very hard to get into so you need to schedule now.   If we're 2 months out from the injury and you're still dealing with significant pain in the pelvis, let me know and we will get you to see the orthopedic specialists

## 2024-08-22 NOTE — PROGRESS NOTES
Assessment & Plan     Hospital discharge follow-up  Closed fracture of sacrum with routine healing, unspecified portion of sacrum, subsequent encounter  Patient is a pleasant 84-year-old female who presents today for hospital follow-up.  Patient had a fall, presented to the hospital, was noted to have a sacral fracture.  From a pain control perspective, she is improving quite a bit.  She has been trying to move around as tolerated.  If pain is getting worse again, or not improving within the next month to 2 months, notify me and we will plan to reimage and/or refer to orthopedics.    Rapid atrial fibrillation (H)  During hospitalization, reviewed history of A-fib.  With imaging findings suggesting subacute CVA, she was started on apixaban.  She has been getting some shakiness after taking her apixaban, recommended that she take it with a small amount of food see if that improves symptoms.  Refill sent to the pharmacy.  For related constipation on her new medications, recommended trial of MiraLAX as they had previously been planning to do.  - metoprolol tartrate (LOPRESSOR) 25 MG tablet  Dispense: 90 tablet; Refill: 3  - apixaban ANTICOAGULANT (ELIQUIS) 2.5 MG tablet  Dispense: 180 tablet; Refill: 3    History of CVA (cerebrovascular accident)  As above, for recent CVA, and on Eliquis currently.  Recommended that she contact the neurology team to get scheduled for posthospital visit.  She was again given their phone number to contact them.          MED REC REQUIRED  Post Medication Reconciliation Status:  Discharge medications reconciled, continue medications without change      Subjective   Gail is a 84 year old, presenting for the following health issues:  Hospital F/U        8/22/2024    10:03 AM   Additional Questions   Roomed by Hollie CHILDERS   Accompanied by Sister and son     [facility]       Hospital Follow-up Visit:    Hospital/Nursing Home/IP Rehab Facility: [facility]  Date of  Admission: 8/12/2024  Date of Discharge: 08/13/2024  Reason(s) for Admission: CVA  Was the patient in the ICU or did the patient experience delirium during hospitalization?  No  Do you have any other stressors you would like to discuss with your provider? No    Problems taking medications regularly:  None  Medication changes since discharge: None  Problems adhering to non-medication therapy:  None    Summary of hospitalization:  [facility] discharge summary reviewed  Diagnostic Tests/Treatments reviewed.  Follow up needed: none  Other Healthcare Providers Involved in Patient s Care:         Homecare  Update since discharge: improved.       Gets some shakiness with taking eliquis, but not taking with food.     Constipation    Plan of care communicated with patient and family               Review of Systems  Constitutional, HEENT, cardiovascular, pulmonary, gi and gu systems are negative, except as otherwise noted.      Objective    /72 (BP Location: Right arm, Patient Position: Sitting, Cuff Size: Adult Small)   Pulse 78   Temp 97.8  F (36.6  C) (Tympanic)   Resp 14   SpO2 98%   There is no height or weight on file to calculate BMI.  Physical Exam  Constitutional:       Appearance: Normal appearance.   HENT:      Head: Normocephalic.   Eyes:      General: No scleral icterus.     Extraocular Movements: Extraocular movements intact.      Conjunctiva/sclera: Conjunctivae normal.   Cardiovascular:      Rate and Rhythm: Normal rate. Rhythm irregular.   Pulmonary:      Effort: Pulmonary effort is normal.   Musculoskeletal:      Comments: Sitting in wheelchair   Neurological:      General: No focal deficit present.      Mental Status: She is alert and oriented to person, place, and time.                Signed Electronically by: Aisha Hull MD

## 2024-08-22 NOTE — TELEPHONE ENCOUNTER
[facility] Call Center    Phone Message    May a detailed message be left on voicemail: yes     Reason for Call: Appointment Intake    Referring Provider Name:   Ksenia Davis NP    Diagnosis and/or Symptoms:   Cerebrovascular accident (CVA) due to embolism of right middle cerebral artery (H)    Current patient of Dr. Michael needs stroke hospital follow up visit within 6-8 weeks from discharge per order. Writer unable to find any appts within requested time frame.     Please advise    Action Taken: Other: Neurology    Travel Screening: Not Applicable

## 2024-08-26 ENCOUNTER — TELEPHONE (OUTPATIENT)
Dept: FAMILY MEDICINE | Facility: CLINIC | Age: 84
End: 2024-08-26
Payer: COMMERCIAL

## 2024-08-26 ENCOUNTER — MYC REFILL (OUTPATIENT)
Dept: FAMILY MEDICINE | Facility: CLINIC | Age: 84
End: 2024-08-26
Payer: COMMERCIAL

## 2024-08-26 DIAGNOSIS — I48.91 RAPID ATRIAL FIBRILLATION (H): ICD-10-CM

## 2024-08-26 RX ORDER — METOPROLOL TARTRATE 25 MG/1
12.5 TABLET, FILM COATED ORAL 2 TIMES DAILY
Qty: 90 TABLET | Refills: 3 | Status: CANCELLED | OUTPATIENT
Start: 2024-08-26

## 2024-08-26 NOTE — TELEPHONE ENCOUNTER
Okay for discontinuation of home health aide.     Would recommend continued RN, PT/OT.   Aisha Hull MD

## 2024-08-26 NOTE — TELEPHONE ENCOUNTER
Home Care is calling regarding an established patient with [facility].    Requesting orders from: Aisha Hull  Provider is following patient: Yes  Is this a 60-day recertification request?  No    Orders Requested    HHA (Home Health Aide)  Request for discontinuation of care due to patient's sister helping her with these cares.    Information was gathered and will be sent to provider for review.  RN will contact Home Care with information after provider review.  Confirmed ok to leave a detailed message with call back.  Contact information confirmed and updated as needed.    Lanie Cordova RN

## 2024-08-26 NOTE — TELEPHONE ENCOUNTER
Home Health Care    Reason for call:  Will move OT Eval two weeks out due to holiday and capacity      Pt Provider: Kurtis    Phone Number Homecare Nurse can be reached at: [phone]  Has secure voicemail  Aleida Velazquez on 8/26/2024 at 3:41 PM

## 2024-08-27 NOTE — TELEPHONE ENCOUNTER
[facility] Call Center    Phone Message    May a detailed message be left on voicemail: yes     Reason for Call: Pt's son Gilberto calls in to schedule with stroke provider per referral as he has not heard anything since his last call. Writer unable to find an appointment within 6-8 week time frame. Please advise.    Gilberto can be reached at [phone].    Action Taken: Message routed to:  Other: [facility]    Travel Screening: Not Applicable

## 2024-08-28 ENCOUNTER — MYC MEDICAL ADVICE (OUTPATIENT)
Dept: FAMILY MEDICINE | Facility: CLINIC | Age: 84
End: 2024-08-28
Payer: COMMERCIAL

## 2024-08-28 DIAGNOSIS — I48.91 RAPID ATRIAL FIBRILLATION (H): ICD-10-CM

## 2024-08-28 RX ORDER — METOPROLOL TARTRATE 25 MG/1
TABLET, FILM COATED ORAL
Qty: 90 TABLET | Refills: 3 | Status: SHIPPED | OUTPATIENT
Start: 2024-08-28

## 2024-09-06 ENCOUNTER — MYC REFILL (OUTPATIENT)
Dept: FAMILY MEDICINE | Facility: CLINIC | Age: 84
End: 2024-09-06
Payer: COMMERCIAL

## 2024-09-06 DIAGNOSIS — I48.91 RAPID ATRIAL FIBRILLATION (H): ICD-10-CM

## 2024-09-06 NOTE — TELEPHONE ENCOUNTER
Writer called patient, left message on identified VM requesting she call the care team back re: verify her preferred pharmacy as she has refills on file through [facility] but now received request from [facility] in [facility].    Julie Behrendt RN

## 2024-09-06 NOTE — TELEPHONE ENCOUNTER
Patient called back, wants to use local pharmacy [facility] location.       Shawanda ESCOBAR  Station

## 2024-09-07 ENCOUNTER — HEALTH MAINTENANCE LETTER (OUTPATIENT)
Age: 84
End: 2024-09-07

## 2024-09-10 ENCOUNTER — TELEPHONE (OUTPATIENT)
Dept: FAMILY MEDICINE | Facility: CLINIC | Age: 84
End: 2024-09-10
Payer: COMMERCIAL

## 2024-09-10 NOTE — TELEPHONE ENCOUNTER
Home Health Care    Reason for call:  Ya with [facility] calling for orders    Orders are needed for this patient.  Skilled Nursing: decrease visits from weekly to every other week as patient has been stable     Pt Provider: Dr. Hull     Phone Number Homecare Nurse can be reached at: [phone]

## 2024-09-13 ENCOUNTER — TELEPHONE (OUTPATIENT)
Dept: FAMILY MEDICINE | Facility: CLINIC | Age: 84
End: 2024-09-13
Payer: COMMERCIAL

## 2024-09-13 NOTE — TELEPHONE ENCOUNTER
Marsha from [facility] calling  Requesting orders for OT  1X a week for 5 weeks  To work on ADL's, transfers, falls prevention    Marsha:  [phone]    Marysol Malloy RN on 9/13/2024 at 3:05 PM

## 2024-09-17 ENCOUNTER — MYC MEDICAL ADVICE (OUTPATIENT)
Dept: FAMILY MEDICINE | Facility: CLINIC | Age: 84
End: 2024-09-17
Payer: COMMERCIAL

## 2024-09-17 ENCOUNTER — TELEPHONE (OUTPATIENT)
Dept: FAMILY MEDICINE | Facility: CLINIC | Age: 84
End: 2024-09-17
Payer: COMMERCIAL

## 2024-09-17 NOTE — TELEPHONE ENCOUNTER
Home Health Care    FYI    Reason for call:  Brittani GARZA with [facility] called to report that patient does not always take her eliquis due to itchiness. She will take majority of time but stops once itching becoming really bothersome.        Orders are needed for this patient.      Pt Provider: dipika branham    Phone Number Homecare Nurse can be reached at: [phone]-- brittani hernandez, occupational therapy.       Could we send this information to you in [place] or would you prefer to receive a phone call?:   Patient would prefer a phone call   Okay to leave a detailed message?: Yes at Cell number on file:    Telephone Information:   Mobile [phone]

## 2024-09-25 ENCOUNTER — TELEPHONE (OUTPATIENT)
Dept: FAMILY MEDICINE | Facility: CLINIC | Age: 84
End: 2024-09-25
Payer: COMMERCIAL

## 2024-09-25 DIAGNOSIS — I48.91 RAPID ATRIAL FIBRILLATION (H): ICD-10-CM

## 2024-09-25 NOTE — TELEPHONE ENCOUNTER
Dr Hull  Please advise.  Lanie CHAPMAN, [facility] is calling to report pt is not taking her Eliquis and hasn't taken it in 2 weeks.  Her itchiness is gone.

## 2024-09-25 NOTE — TELEPHONE ENCOUNTER
Messaged patient on 9/17/24 about this. She said she wanted to stay on the Eliquis. However, if she is not taking it, we need to switch to something she will take. Will send xarelto to the pharmacy    Aisha Hull MD

## 2024-09-25 NOTE — TELEPHONE ENCOUNTER
Detailed message left on Lanie's identified VM with PCP's message below. Writer also attempted to notify pt, no answer. VM left to return call to clinic.    Darlene Pickard RN  [facility]

## 2024-09-26 ENCOUNTER — DOCUMENTATION ONLY (OUTPATIENT)
Dept: ANTICOAGULATION | Facility: CLINIC | Age: 84
End: 2024-09-26
Payer: COMMERCIAL

## 2024-09-26 NOTE — PROGRESS NOTES
Anticoagulant Therapeutic Duplication    Duplicate orders identified: different medication of the same therapeutic class    Apixaban discontinued due to itching; rivaroxaban prescribed to replace it, both starter pack and ongoing prescription have been ordered.    Active anticoagulant: rivaroxaban (Xarelto)    Plan made per [facility] anticoagulation protocol.    Barbara Betancourt RN  9/26/2024

## 2024-09-26 NOTE — TELEPHONE ENCOUNTER
Spoke with patient, relayed Dr. Aisha Hull's detailed message and she verbalized good understanding.     States her son is picking up the medication today, she is in agreement with taking this and reviewed dosing with patient, she verbalized good understanding.     Julie Behrendt RN

## 2024-10-06 PROBLEM — Z86.73 HISTORY OF STROKE: Status: ACTIVE | Noted: 2024-08-12

## 2024-10-06 NOTE — PROGRESS NOTES
NEUROLOGY FOLLOW UP VISIT  NOTE       [facility] NEUROLOGY [place]  [address] [place] [address]  Tel: [phone]  Fax: [phone]  [email or web address]     Gail Cuba,  1940, MRN [id number]  PCP: Aisha Hull  Date: 10/08/2024      ASSESSMENT & PLAN     Visit Diagnosis  Vitamin B12 deficiency (non anemic)  Neurocognitive disorder  History of stroke     Neurocognitive disorder; R/O Parkinson disease  84-year-old female with history of HTN, recurrent depression, anxiety who was last seen on 2023 for cognitive decline who returns for follow-up.  Lab work at that time showed a elevated homocystine and a low B12 and she was started on supplement.  Although I recommended MRI brain it was not done but she was admitted to the hospital in 2024 with right carpal striata infarct and pelvic fracture.  She was noted to have right sided tremor and clinical diagnosis of Parkinson disease was made and she was started on Sinemet.  She is not sure if it helped.  On exam she has cogwheel rigidity on the right.  I have recommended:    1.  Continue Xarelto  2.  Her LDL on 2024 was 75, no need for statin  3.  Repeat MMA, folate and B12 level  4.  DaTscan as the possibility of Parkinson disease was raised during hospitalization and she was empirically started on Sinemet.  5.  Follow-up after above tests    Thank you again for this referral, please feel free to contact me if you have any questions.    John Michael MD  [facility]     HISTORY OF PRESENT ILLNESS     Patient is a 84-year-old female with history of HTN, recurrent depression, anxiety last seen on 2023 who was admitted to the hospital on 2024 for weakness and reported that she had a fall 3 weeks prior to admission and was found to have pelvic fracture, subacute CVA involving the right corpus striatum.  She was also found to be in atrial fibrillation and was started on  Eliquis and a statin.  Previously patient had reported gait difficulty with bladder incontinence and the triad raise the possibility of normal pressure hydrocephalus and I had previously recommended MRI brain and some lab work that included normal alpha-tocopherol, B1, TSH, RPR, MMA, folate but an elevated homocystine and a low vitamin B12.  She was started on B12 supplements during hospitalization she was started on Sinemet as she had some right sided tremors..  She has started using a walker    Patient lives alone and reported that in the last few years she had noted steady decline in her memory along with imbalance and bladder incontinence.  She was having accidents.  She denied any hallucination or change in her personality.  Her sister who lives across the street checks on her regularly and patient claimed that she was attending to activities of daily living     PROBLEM LIST   Patient Active Problem List   Diagnosis    Personal history of malignant neoplasm of breast    THICKENING ENDOMETRIUM    Hyperlipidemia LDL goal <130    Urge incontinence of urine    Anxiety    Endometrial mass    Osteoporosis    Recurrent major depressive disorder, in partial remission (H)    Vitamin B12 deficiency (non anemic)    Abnormal head CT    Rapid atrial fibrillation (H)    Abnormal CT scan, pelvis    History of stroke    Fall    Closed fracture of sacrum (H)    Cerebrovascular accident (CVA) due to embolism of right middle cerebral artery (H)         PAST MEDICAL & SURGICAL HISTORY     Past Medical History:   Patient  has a past medical history of Anxiety, Breast cancer (H) (2009), Breast mass, Chest pain, Hypertension, MEDICAL HISTORY OF - (6-08), and Urge incontinence.    Surgical History:  She  has a past surgical history that includes surgical history of - ; EXCISION BREAST LESION, OPEN >=1 (6-13-08); breast lumpectomy, rt/lt (1-26-09); surgical history of -  (11/17/09); d & c (5/24/10); Colonoscopy (7/25/2013); Biopsy  breast; Lumpectomy breast (2009); Dilation and curettage, operative hysteroscopy, combined (1/14/2014); Phacoemulsification with standard intraocular lens implant (Left, 2/12/2020); and Phacoemulsification with standard intraocular lens implant (Right, 3/4/2020).     SOCIAL HISTORY     Reviewed, and she  reports that she has never smoked. She has never used smokeless tobacco. She reports current alcohol use. She reports that she does not use drugs.     FAMILY HISTORY     Reviewed, and family history includes Alcohol/Drug in her mother; Anxiety Disorder in her sister; Cancer - colorectal in her maternal grandfather; Cerebrovascular Disease in her mother; Depression in her brother and sister; Diabetes in her brother and father; Hyperlipidemia in her sister; Hypertension in her mother and son; Musculoskeletal Disorder in her son; Osteoporosis in her sister; Prostate Cancer in her father; Thyroid Disease in her sister.     ALLERGIES     Allergies   Allergen Reactions    Latex      Blistery rash from gloves    Nkda [No Known Drug Allergy]          REVIEW OF SYSTEMS     A 12 point review of system was performed and was negative except as outlined in the history of present illness.     HOME MEDICATIONS     Current Outpatient Rx   Medication Sig Dispense Refill    acetaminophen (TYLENOL) 500 MG tablet Take 500 mg by mouth nightly as needed for pain      carbidopa-levodopa (SINEMET)  MG tablet Take 1 tablet by mouth 2 times daily 180 tablet 3    escitalopram (LEXAPRO) 10 MG tablet Take 1.5 tablets (15 mg) by mouth daily 135 tablet 3    metoprolol tartrate (LOPRESSOR) 25 MG tablet Take 1/2 tablet by mouth 2 times daily 90 tablet 3    metoprolol tartrate (LOPRESSOR) 25 MG tablet Take 0.5 tablets (12.5 mg) by mouth 2 times daily. 90 tablet 3    Rivaroxaban ANTICOAGULANT (XARELTO STARTER PACK ANTICOAGULANT) 15 & 20 MG TBPK Starter Therapy Pack Take by mouth.      [START ON 10/25/2024] rivaroxaban ANTICOAGULANT (XARELTO) 20  "MG TABS tablet Take 1 tablet (20 mg) by mouth daily (with dinner). 90 tablet 3         PHYSICAL EXAM     Vital signs  /81 (BP Location: Right arm, Patient Position: Sitting)   Pulse 81   Ht 1.613 m (5' 3.5\")   Wt 53.5 kg (118 lb)   BMI 20.57 kg/m      Weight:   118 lbs 0 oz    Elderly female who is alert and oriented vital signs were reviewed and documented in electronic medical record. Neck supple. Neurologically speech was normal cranial nerves II through XII are intact motor strength 5/5 reflexes 1+ in upper extremity 2+ in the lower extremity toes downgoing. She has trouble tandem walking Romberg negative.      PERTINENT DIAGNOSTIC STUDIES     Following studies were reviewed:     MRI BRAIN, MRA 8/12/2024  HEAD MRI:   1.  Subacute right corpus striatal infarct.  2.  No mass effect or hemorrhage.     HEAD MRA:   1.  No large vessel occlusion or hemodynamically significant stenosis.     NECK MRA:  1.  No large vessel occlusion or hemodynamically significant stenosis.    ECHOCARDIOGRAM 8/12/2024  1. The left ventricle is normal in size. There is normal left ventricular wall  thickness. Left ventricular systolic function is low normal. The visual  ejection fraction is 50-55%. Diastolic function not assessed due to atrial  fibrillation. There is mild global hypokinesia of the left ventricle. There is  no thrombus seen in the left ventricle.  2. The right ventricle is normal size. The right ventricular systolic function  is normal.  3. Moderate mitral and tricuspid regurgitation.  4. No pericardial effusion.  5. No previous study for comparison.       PERTINENT LABS  Following labs were reviewed:  Admission on 08/12/2024, Discharged on 08/13/2024   Component Date Value Ref Range Status    Sodium 08/12/2024 142  135 - 145 mmol/L Final    Potassium 08/12/2024 3.8  3.4 - 5.3 mmol/L Final    Chloride 08/12/2024 108 (H)  98 - 107 mmol/L Final    Carbon Dioxide (CO2) 08/12/2024 25  22 - 29 mmol/L Final    Anion Gap " 08/12/2024 9  7 - 15 mmol/L Final    Urea Nitrogen 08/12/2024 14.7  8.0 - 23.0 mg/dL Final    Creatinine 08/12/2024 0.54  0.51 - 0.95 mg/dL Final    GFR Estimate 08/12/2024 90  >60 mL/min/1.73m2 Final    Calcium 08/12/2024 8.9  8.8 - 10.4 mg/dL Final    Glucose 08/12/2024 115 (H)  70 - 99 mg/dL Final    CRP Inflammation 08/12/2024 7.61 (H)  <5.00 mg/L Final    N terminal Pro BNP Inpatient 08/12/2024 2,789 (H)  0 - 1,800 pg/mL Final    WBC Count 08/12/2024 5.7  4.0 - 11.0 10e3/uL Final    RBC Count 08/12/2024 4.38  3.80 - 5.20 10e6/uL Final    Hemoglobin 08/12/2024 12.6  11.7 - 15.7 g/dL Final    Hematocrit 08/12/2024 39.0  35.0 - 47.0 % Final    MCV 08/12/2024 89  78 - 100 fL Final    MCH 08/12/2024 28.8  26.5 - 33.0 pg Final    MCHC 08/12/2024 32.3  31.5 - 36.5 g/dL Final    RDW 08/12/2024 13.1  10.0 - 15.0 % Final    Platelet Count 08/12/2024 293  150 - 450 10e3/uL Final    % Neutrophils 08/12/2024 69  % Final    % Lymphocytes 08/12/2024 19  % Final    % Monocytes 08/12/2024 10  % Final    % Eosinophils 08/12/2024 2  % Final    % Basophils 08/12/2024 1  % Final    % Immature Granulocytes 08/12/2024 0  % Final    NRBCs per 100 WBC 08/12/2024 0  <1 /100 Final    Absolute Neutrophils 08/12/2024 3.9  1.6 - 8.3 10e3/uL Final    Absolute Lymphocytes 08/12/2024 1.1  0.8 - 5.3 10e3/uL Final    Absolute Monocytes 08/12/2024 0.6  0.0 - 1.3 10e3/uL Final    Absolute Eosinophils 08/12/2024 0.1  0.0 - 0.7 10e3/uL Final    Absolute Basophils 08/12/2024 0.0  0.0 - 0.2 10e3/uL Final    Absolute Immature Granulocytes 08/12/2024 0.0  <=0.4 10e3/uL Final    Absolute NRBCs 08/12/2024 0.0  10e3/uL Final    Hold Specimen 08/12/2024 [facility]   Final    Hold Specimen 08/12/2024 [facility]   Final    Hemoglobin A1C 08/12/2024 5.8 (H)  <5.7 % Final    Cholesterol 08/12/2024 143  <200 mg/dL Final    Triglycerides 08/12/2024 120  <150 mg/dL Final    Direct Measure HDL 08/12/2024 44 (L)  >=50 mg/dL Final    LDL Cholesterol Calculated 08/12/2024 75  <=100  mg/dL Final    Non HDL Cholesterol 08/12/2024 99  <130 mg/dL Final    INR 08/12/2024 1.21 (H)  0.85 - 1.15 Final    Troponin T, High Sensitivity 08/12/2024 8  <=14 ng/L Final    Magnesium 08/12/2024 2.0  1.7 - 2.3 mg/dL Final    TSH 08/12/2024 1.33  0.30 - 4.20 uIU/mL Final    Protein Total 08/12/2024 6.3 (L)  6.4 - 8.3 g/dL Final    Albumin 08/12/2024 3.8  3.5 - 5.2 g/dL Final    Bilirubin Total 08/12/2024 0.5  <=1.2 mg/dL Final    Alkaline Phosphatase 08/12/2024 145  40 - 150 U/L Final    AST 08/12/2024 23  0 - 45 U/L Final    ALT 08/12/2024 6  0 - 50 U/L Final    Bilirubin Direct 08/12/2024 <0.20  0.00 - 0.30 mg/dL Final    GLUCOSE BY METER POCT 08/12/2024 88  70 - 99 mg/dL Final    Sodium 08/13/2024 141  135 - 145 mmol/L Final    Potassium 08/13/2024 3.5  3.4 - 5.3 mmol/L Final    Chloride 08/13/2024 107  98 - 107 mmol/L Final    Carbon Dioxide (CO2) 08/13/2024 25  22 - 29 mmol/L Final    Anion Gap 08/13/2024 9  7 - 15 mmol/L Final    Urea Nitrogen 08/13/2024 12.0  8.0 - 23.0 mg/dL Final    Creatinine 08/13/2024 0.62  0.51 - 0.95 mg/dL Final    GFR Estimate 08/13/2024 87  >60 mL/min/1.73m2 Final    Calcium 08/13/2024 8.4 (L)  8.8 - 10.4 mg/dL Final    Glucose 08/13/2024 96  70 - 99 mg/dL Final    WBC Count 08/13/2024 3.8 (L)  4.0 - 11.0 10e3/uL Final    RBC Count 08/13/2024 4.01  3.80 - 5.20 10e6/uL Final    Hemoglobin 08/13/2024 11.6 (L)  11.7 - 15.7 g/dL Final    Hematocrit 08/13/2024 37.0  35.0 - 47.0 % Final    MCV 08/13/2024 92  78 - 100 fL Final    MCH 08/13/2024 28.9  26.5 - 33.0 pg Final    MCHC 08/13/2024 31.4 (L)  31.5 - 36.5 g/dL Final    RDW 08/13/2024 13.0  10.0 - 15.0 % Final    Platelet Count 08/13/2024 269  150 - 450 10e3/uL Final    LVEF  08/13/2024 50-55%   Final    GLUCOSE BY METER POCT 08/13/2024 100 (H)  70 - 99 mg/dL Final    GLUCOSE BY METER POCT 08/13/2024 97  70 - 99 mg/dL Final    GLUCOSE BY METER POCT 08/13/2024 97  70 - 99 mg/dL Final    GLUCOSE BY METER POCT 08/13/2024 109 (H)  70 -  99 mg/dL Final         Total time spent for face to face visit, reviewing labs/imaging studies, counseling and coordination of care was: 45 Minutes spent on the date of the encounter doing chart review, review of outside records, review of test results, interpretation of tests, patient visit, and documentation     The longitudinal plan of care for the diagnosis(es)/condition(s) as documented were addressed during this visit. Due to the added complexity in care, I will continue to support Gail in the subsequent management and with ongoing continuity of care.    This note was dictated using voice recognition software.  Any grammatical or context distortions are unintentional and inherent to the software.    No orders of the defined types were placed in this encounter.     New Prescriptions    No medications on file     Modified Medications    No medications on file

## 2024-10-08 ENCOUNTER — OFFICE VISIT (OUTPATIENT)
Dept: NEUROLOGY | Facility: CLINIC | Age: 84
End: 2024-10-08
Payer: COMMERCIAL

## 2024-10-08 VITALS
HEART RATE: 81 BPM | HEIGHT: 64 IN | SYSTOLIC BLOOD PRESSURE: 110 MMHG | WEIGHT: 118 LBS | DIASTOLIC BLOOD PRESSURE: 81 MMHG | BODY MASS INDEX: 20.14 KG/M2

## 2024-10-08 DIAGNOSIS — E53.8 VITAMIN B12 DEFICIENCY (NON ANEMIC): Primary | ICD-10-CM

## 2024-10-08 DIAGNOSIS — R41.9 NEUROCOGNITIVE DISORDER: ICD-10-CM

## 2024-10-08 DIAGNOSIS — G20.A1 PARKINSON'S DISEASE WITHOUT DYSKINESIA OR FLUCTUATING MANIFESTATIONS (H): ICD-10-CM

## 2024-10-08 PROCEDURE — 99214 OFFICE O/P EST MOD 30 MIN: CPT | Performed by: PSYCHIATRY & NEUROLOGY

## 2024-10-08 PROCEDURE — G2211 COMPLEX E/M VISIT ADD ON: HCPCS | Performed by: PSYCHIATRY & NEUROLOGY

## 2024-10-08 RX ORDER — RIVAROXABAN 15 MG-20MG
KIT ORAL
COMMUNITY

## 2024-10-08 NOTE — LETTER
10/8/2024      Gail Cuba  [address]  [place] [address]      Dear Colleague,    Thank you for referring your patient, Gail Cuba, to the [facility] [place]. Please see a copy of my visit note below.    NEUROLOGY FOLLOW UP VISIT  NOTE       [facility] [place]  [address] [place] [address]  Tel: [phone]  Fax: [phone]  [email or web address]     Gail Cuba,  1940, MRN [id number]  PCP: Aisha Hull  Date: 10/08/2024      ASSESSMENT & PLAN     Visit Diagnosis  Vitamin B12 deficiency (non anemic)  Neurocognitive disorder  History of stroke     Neurocognitive disorder; R/O Parkinson disease  84-year-old female with history of HTN, recurrent depression, anxiety who was last seen on 2023 for cognitive decline who returns for follow-up.  Lab work at that time showed a elevated homocystine and a low B12 and she was started on supplement.  Although I recommended MRI brain it was not done but she was admitted to the hospital in 2024 with right carpal striata infarct and pelvic fracture.  She was noted to have right sided tremor and clinical diagnosis of Parkinson disease was made and she was started on Sinemet.  She is not sure if it helped.  On exam she has cogwheel rigidity on the right.  I have recommended:    1.  Continue Xarelto  2.  Her LDL on 2024 was 75, no need for statin  3.  Repeat MMA, folate and B12 level  4.  DaTscan as the possibility of Parkinson disease was raised during hospitalization and she was empirically started on Sinemet.  5.  Follow-up after above tests    Thank you again for this referral, please feel free to contact me if you have any questions.    John Michael MD  [facility]UR[facility], [place]     HISTORY OF PRESENT ILLNESS     Patient is a 84-year-old female with history of HTN, recurrent depression, anxiety last seen on 2023 who was admitted to the  hospital on 8/12/2024 for weakness and reported that she had a fall 3 weeks prior to admission and was found to have pelvic fracture, subacute CVA involving the right corpus striatum.  She was also found to be in atrial fibrillation and was started on Eliquis and a statin.  Previously patient had reported gait difficulty with bladder incontinence and the triad raise the possibility of normal pressure hydrocephalus and I had previously recommended MRI brain and some lab work that included normal alpha-tocopherol, B1, TSH, RPR, MMA, folate but an elevated homocystine and a low vitamin B12.  She was started on B12 supplements during hospitalization she was started on Sinemet as she had some right sided tremors..  She has started using a walker    Patient lives alone and reported that in the last few years she had noted steady decline in her memory along with imbalance and bladder incontinence.  She was having accidents.  She denied any hallucination or change in her personality.  Her sister who lives across the street checks on her regularly and patient claimed that she was attending to activities of daily living     PROBLEM LIST   Patient Active Problem List   Diagnosis     Personal history of malignant neoplasm of breast     THICKENING ENDOMETRIUM     Hyperlipidemia LDL goal <130     Urge incontinence of urine     Anxiety     Endometrial mass     Osteoporosis     Recurrent major depressive disorder, in partial remission (H)     Vitamin B12 deficiency (non anemic)     Abnormal head CT     Rapid atrial fibrillation (H)     Abnormal CT scan, pelvis     History of stroke     Fall     Closed fracture of sacrum (H)     Cerebrovascular accident (CVA) due to embolism of right middle cerebral artery (H)         PAST MEDICAL & SURGICAL HISTORY     Past Medical History:   Patient  has a past medical history of Anxiety, Breast cancer (H) (2009), Breast mass, Chest pain, Hypertension, MEDICAL HISTORY OF - (6-08), and Urge  incontinence.    Surgical History:  She  has a past surgical history that includes surgical history of - ; EXCISION BREAST LESION, OPEN >=1 (6-13-08); breast lumpectomy, rt/lt (1-26-09); surgical history of -  (11/17/09); d & c (5/24/10); Colonoscopy (7/25/2013); Biopsy breast; Lumpectomy breast (2009); Dilation and curettage, operative hysteroscopy, combined (1/14/2014); Phacoemulsification with standard intraocular lens implant (Left, 2/12/2020); and Phacoemulsification with standard intraocular lens implant (Right, 3/4/2020).     SOCIAL HISTORY     Reviewed, and she  reports that she has never smoked. She has never used smokeless tobacco. She reports current alcohol use. She reports that she does not use drugs.     FAMILY HISTORY     Reviewed, and family history includes Alcohol/Drug in her mother; Anxiety Disorder in her sister; Cancer - colorectal in her maternal grandfather; Cerebrovascular Disease in her mother; Depression in her brother and sister; Diabetes in her brother and father; Hyperlipidemia in her sister; Hypertension in her mother and son; Musculoskeletal Disorder in her son; Osteoporosis in her sister; Prostate Cancer in her father; Thyroid Disease in her sister.     ALLERGIES     Allergies   Allergen Reactions     Latex      Blistery rash from gloves     Nkda [No Known Drug Allergy]          REVIEW OF SYSTEMS     A 12 point review of system was performed and was negative except as outlined in the history of present illness.     HOME MEDICATIONS     Current Outpatient Rx   Medication Sig Dispense Refill     acetaminophen (TYLENOL) 500 MG tablet Take 500 mg by mouth nightly as needed for pain       carbidopa-levodopa (SINEMET)  MG tablet Take 1 tablet by mouth 2 times daily 180 tablet 3     escitalopram (LEXAPRO) 10 MG tablet Take 1.5 tablets (15 mg) by mouth daily 135 tablet 3     metoprolol tartrate (LOPRESSOR) 25 MG tablet Take 1/2 tablet by mouth 2 times daily 90 tablet 3     metoprolol  "tartrate (LOPRESSOR) 25 MG tablet Take 0.5 tablets (12.5 mg) by mouth 2 times daily. 90 tablet 3     Rivaroxaban ANTICOAGULANT (XARELTO STARTER PACK ANTICOAGULANT) 15 & 20 MG TBPK Starter Therapy Pack Take by mouth.       [START ON 10/25/2024] rivaroxaban ANTICOAGULANT (XARELTO) 20 MG TABS tablet Take 1 tablet (20 mg) by mouth daily (with dinner). 90 tablet 3         PHYSICAL EXAM     Vital signs  /81 (BP Location: Right arm, Patient Position: Sitting)   Pulse 81   Ht 1.613 m (5' 3.5\")   Wt 53.5 kg (118 lb)   BMI 20.57 kg/m      Weight:   118 lbs 0 oz    Elderly female who is alert and oriented vital signs were reviewed and documented in electronic medical record. Neck supple. Neurologically speech was normal cranial nerves II through XII are intact motor strength 5/5 reflexes 1+ in upper extremity 2+ in the lower extremity toes downgoing. She has trouble tandem walking Romberg negative.      PERTINENT DIAGNOSTIC STUDIES     Following studies were reviewed:     MRI BRAIN, MRA 8/12/2024  HEAD MRI:   1.  Subacute right corpus striatal infarct.  2.  No mass effect or hemorrhage.     HEAD MRA:   1.  No large vessel occlusion or hemodynamically significant stenosis.     NECK MRA:  1.  No large vessel occlusion or hemodynamically significant stenosis.    ECHOCARDIOGRAM 8/12/2024  1. The left ventricle is normal in size. There is normal left ventricular wall  thickness. Left ventricular systolic function is low normal. The visual  ejection fraction is 50-55%. Diastolic function not assessed due to atrial  fibrillation. There is mild global hypokinesia of the left ventricle. There is  no thrombus seen in the left ventricle.  2. The right ventricle is normal size. The right ventricular systolic function  is normal.  3. Moderate mitral and tricuspid regurgitation.  4. No pericardial effusion.  5. No previous study for comparison.       PERTINENT LABS  Following labs were reviewed:  Admission on 08/12/2024, " Discharged on 08/13/2024   Component Date Value Ref Range Status     Sodium 08/12/2024 142  135 - 145 mmol/L Final     Potassium 08/12/2024 3.8  3.4 - 5.3 mmol/L Final     Chloride 08/12/2024 108 (H)  98 - 107 mmol/L Final     Carbon Dioxide (CO2) 08/12/2024 25  22 - 29 mmol/L Final     Anion Gap 08/12/2024 9  7 - 15 mmol/L Final     Urea Nitrogen 08/12/2024 14.7  8.0 - 23.0 mg/dL Final     Creatinine 08/12/2024 0.54  0.51 - 0.95 mg/dL Final     GFR Estimate 08/12/2024 90  >60 mL/min/1.73m2 Final     Calcium 08/12/2024 8.9  8.8 - 10.4 mg/dL Final     Glucose 08/12/2024 115 (H)  70 - 99 mg/dL Final     CRP Inflammation 08/12/2024 7.61 (H)  <5.00 mg/L Final     N terminal Pro BNP Inpatient 08/12/2024 2,789 (H)  0 - 1,800 pg/mL Final     WBC Count 08/12/2024 5.7  4.0 - 11.0 10e3/uL Final     RBC Count 08/12/2024 4.38  3.80 - 5.20 10e6/uL Final     Hemoglobin 08/12/2024 12.6  11.7 - 15.7 g/dL Final     Hematocrit 08/12/2024 39.0  35.0 - 47.0 % Final     MCV 08/12/2024 89  78 - 100 fL Final     MCH 08/12/2024 28.8  26.5 - 33.0 pg Final     MCHC 08/12/2024 32.3  31.5 - 36.5 g/dL Final     RDW 08/12/2024 13.1  10.0 - 15.0 % Final     Platelet Count 08/12/2024 293  150 - 450 10e3/uL Final     % Neutrophils 08/12/2024 69  % Final     % Lymphocytes 08/12/2024 19  % Final     % Monocytes 08/12/2024 10  % Final     % Eosinophils 08/12/2024 2  % Final     % Basophils 08/12/2024 1  % Final     % Immature Granulocytes 08/12/2024 0  % Final     NRBCs per 100 WBC 08/12/2024 0  <1 /100 Final     Absolute Neutrophils 08/12/2024 3.9  1.6 - 8.3 10e3/uL Final     Absolute Lymphocytes 08/12/2024 1.1  0.8 - 5.3 10e3/uL Final     Absolute Monocytes 08/12/2024 0.6  0.0 - 1.3 10e3/uL Final     Absolute Eosinophils 08/12/2024 0.1  0.0 - 0.7 10e3/uL Final     Absolute Basophils 08/12/2024 0.0  0.0 - 0.2 10e3/uL Final     Absolute Immature Granulocytes 08/12/2024 0.0  <=0.4 10e3/uL Final     Absolute NRBCs 08/12/2024 0.0  10e3/uL Final     Hold  Specimen 08/12/2024 [facility]   Final     Hold Specimen 08/12/2024 [facility]   Final     Hemoglobin A1C 08/12/2024 5.8 (H)  <5.7 % Final     Cholesterol 08/12/2024 143  <200 mg/dL Final     Triglycerides 08/12/2024 120  <150 mg/dL Final     Direct Measure HDL 08/12/2024 44 (L)  >=50 mg/dL Final     LDL Cholesterol Calculated 08/12/2024 75  <=100 mg/dL Final     Non HDL Cholesterol 08/12/2024 99  <130 mg/dL Final     INR 08/12/2024 1.21 (H)  0.85 - 1.15 Final     Troponin T, High Sensitivity 08/12/2024 8  <=14 ng/L Final     Magnesium 08/12/2024 2.0  1.7 - 2.3 mg/dL Final     TSH 08/12/2024 1.33  0.30 - 4.20 uIU/mL Final     Protein Total 08/12/2024 6.3 (L)  6.4 - 8.3 g/dL Final     Albumin 08/12/2024 3.8  3.5 - 5.2 g/dL Final     Bilirubin Total 08/12/2024 0.5  <=1.2 mg/dL Final     Alkaline Phosphatase 08/12/2024 145  40 - 150 U/L Final     AST 08/12/2024 23  0 - 45 U/L Final     ALT 08/12/2024 6  0 - 50 U/L Final     Bilirubin Direct 08/12/2024 <0.20  0.00 - 0.30 mg/dL Final     GLUCOSE BY METER POCT 08/12/2024 88  70 - 99 mg/dL Final     Sodium 08/13/2024 141  135 - 145 mmol/L Final     Potassium 08/13/2024 3.5  3.4 - 5.3 mmol/L Final     Chloride 08/13/2024 107  98 - 107 mmol/L Final     Carbon Dioxide (CO2) 08/13/2024 25  22 - 29 mmol/L Final     Anion Gap 08/13/2024 9  7 - 15 mmol/L Final     Urea Nitrogen 08/13/2024 12.0  8.0 - 23.0 mg/dL Final     Creatinine 08/13/2024 0.62  0.51 - 0.95 mg/dL Final     GFR Estimate 08/13/2024 87  >60 mL/min/1.73m2 Final     Calcium 08/13/2024 8.4 (L)  8.8 - 10.4 mg/dL Final     Glucose 08/13/2024 96  70 - 99 mg/dL Final     WBC Count 08/13/2024 3.8 (L)  4.0 - 11.0 10e3/uL Final     RBC Count 08/13/2024 4.01  3.80 - 5.20 10e6/uL Final     Hemoglobin 08/13/2024 11.6 (L)  11.7 - 15.7 g/dL Final     Hematocrit 08/13/2024 37.0  35.0 - 47.0 % Final     MCV 08/13/2024 92  78 - 100 fL Final     MCH 08/13/2024 28.9  26.5 - 33.0 pg Final     MCHC 08/13/2024 31.4 (L)  31.5 - 36.5 g/dL Final      RDW 08/13/2024 13.0  10.0 - 15.0 % Final     Platelet Count 08/13/2024 269  150 - 450 10e3/uL Final     LVEF  08/13/2024 50-55%   Final     GLUCOSE BY METER POCT 08/13/2024 100 (H)  70 - 99 mg/dL Final     GLUCOSE BY METER POCT 08/13/2024 97  70 - 99 mg/dL Final     GLUCOSE BY METER POCT 08/13/2024 97  70 - 99 mg/dL Final     GLUCOSE BY METER POCT 08/13/2024 109 (H)  70 - 99 mg/dL Final         Total time spent for face to face visit, reviewing labs/imaging studies, counseling and coordination of care was: 45 Minutes spent on the date of the encounter doing chart review, review of outside records, review of test results, interpretation of tests, patient visit, and documentation     The longitudinal plan of care for the diagnosis(es)/condition(s) as documented were addressed during this visit. Due to the added complexity in care, I will continue to support Gail in the subsequent management and with ongoing continuity of care.    This note was dictated using voice recognition software.  Any grammatical or context distortions are unintentional and inherent to the software.    No orders of the defined types were placed in this encounter.     New Prescriptions    No medications on file     Modified Medications    No medications on file                 Again, thank you for allowing me to participate in the care of your patient.        Sincerely,        John Michael MD

## 2024-10-08 NOTE — NURSING NOTE
Chief Complaint   Patient presents with    Subacute CVA of right basal ganglia     Stroke follow up- patient reports episodes of freezing and shaking of the leg. Still in PT      Anne Armstrong CMA on 10/8/2024 at 2:47 PM  [facility]

## 2024-10-11 ENCOUNTER — TELEPHONE (OUTPATIENT)
Dept: FAMILY MEDICINE | Facility: CLINIC | Age: 84
End: 2024-10-11
Payer: COMMERCIAL

## 2024-10-11 NOTE — TELEPHONE ENCOUNTER
Home Care is calling regarding an established patient with [facility].       Requesting orders from: Aisha Hull  Provider is following patient: Yes  Is this a 60-day recertification request?  No    Orders Requested    Occupational Therapy  Request for delay in care, service is not able to be provided within same scheduled day.  reschedule recert from today until Monday.    Information was gathered and will be sent to provider for review.  RN will contact Home Care with information after provider review.  Confirmed ok to leave a detailed message with call back.  Contact information confirmed and updated as needed.    Karli Odonnell RN

## 2024-10-16 ENCOUNTER — TELEPHONE (OUTPATIENT)
Dept: FAMILY MEDICINE | Facility: CLINIC | Age: 84
End: 2024-10-16
Payer: COMMERCIAL

## 2024-10-16 NOTE — TELEPHONE ENCOUNTER
[facility] is requesting verbal orders for:  OT 1x per week for 2 weeks then  1x every other week for 4 weeks.  PT eval and treat.  Treatments are for Safety and ADLs.

## 2024-10-18 ENCOUNTER — TELEPHONE (OUTPATIENT)
Dept: FAMILY MEDICINE | Facility: CLINIC | Age: 84
End: 2024-10-18
Payer: COMMERCIAL

## 2024-10-18 NOTE — TELEPHONE ENCOUNTER
Need orders for OT    1x a week for 2 week 1x eow for 2 weeks for adls and LE lymphedema therapy  Charlee Marquez RN on 10/18/2024 at 3:58 PM

## 2024-10-21 ENCOUNTER — TELEPHONE (OUTPATIENT)
Dept: FAMILY MEDICINE | Facility: CLINIC | Age: 84
End: 2024-10-21
Payer: COMMERCIAL

## 2024-10-21 NOTE — TELEPHONE ENCOUNTER
Home Health Care    Reason for call:  looking for verbal home care orders for physical therapy.    Orders are needed for this patient.  PT: 1x a week for 4 weeks and 1x every other week for 4 weeks.   Gait balance and strengthening.     Pt Provider: yair  Phone Number Homecare Nurse can be reached at: [phone]

## 2024-10-21 NOTE — TELEPHONE ENCOUNTER
Voicemail does not say confidential/secure. Left message to call back.    Karli Odonnell RN on 10/21/2024 at 3:08 PM

## 2024-10-23 DIAGNOSIS — Z53.9 DIAGNOSIS NOT YET DEFINED: Primary | ICD-10-CM

## 2024-10-23 PROCEDURE — G0179 MD RECERTIFICATION HHA PT: HCPCS | Performed by: STUDENT IN AN ORGANIZED HEALTH CARE EDUCATION/TRAINING PROGRAM

## 2024-10-26 ENCOUNTER — MYC REFILL (OUTPATIENT)
Dept: FAMILY MEDICINE | Facility: CLINIC | Age: 84
End: 2024-10-26
Payer: COMMERCIAL

## 2024-10-29 RX ORDER — RIVAROXABAN 15 MG-20MG
KIT ORAL
OUTPATIENT
Start: 2024-10-29

## 2024-11-14 ENCOUNTER — OFFICE VISIT (OUTPATIENT)
Dept: FAMILY MEDICINE | Facility: CLINIC | Age: 84
End: 2024-11-14
Payer: COMMERCIAL

## 2024-11-14 VITALS
WEIGHT: 105 LBS | HEIGHT: 64 IN | SYSTOLIC BLOOD PRESSURE: 118 MMHG | BODY MASS INDEX: 17.93 KG/M2 | HEART RATE: 54 BPM | RESPIRATION RATE: 16 BRPM | DIASTOLIC BLOOD PRESSURE: 80 MMHG | OXYGEN SATURATION: 98 %

## 2024-11-14 DIAGNOSIS — F33.41 RECURRENT MAJOR DEPRESSIVE DISORDER, IN PARTIAL REMISSION (H): Primary | ICD-10-CM

## 2024-11-14 DIAGNOSIS — L85.3 DRY SKIN: ICD-10-CM

## 2024-11-14 DIAGNOSIS — B35.1 ONYCHOMYCOSIS: ICD-10-CM

## 2024-11-14 PROCEDURE — 99214 OFFICE O/P EST MOD 30 MIN: CPT | Performed by: STUDENT IN AN ORGANIZED HEALTH CARE EDUCATION/TRAINING PROGRAM

## 2024-11-14 PROCEDURE — G2211 COMPLEX E/M VISIT ADD ON: HCPCS | Performed by: STUDENT IN AN ORGANIZED HEALTH CARE EDUCATION/TRAINING PROGRAM

## 2024-11-14 RX ORDER — ESCITALOPRAM OXALATE 20 MG/1
20 TABLET ORAL DAILY
Qty: 90 TABLET | Refills: 3 | Status: SHIPPED | OUTPATIENT
Start: 2024-11-14

## 2024-11-14 ASSESSMENT — ANXIETY QUESTIONNAIRES
GAD7 TOTAL SCORE: 9
GAD7 TOTAL SCORE: 9
1. FEELING NERVOUS, ANXIOUS, OR ON EDGE: MORE THAN HALF THE DAYS
3. WORRYING TOO MUCH ABOUT DIFFERENT THINGS: NEARLY EVERY DAY
6. BECOMING EASILY ANNOYED OR IRRITABLE: MORE THAN HALF THE DAYS
5. BEING SO RESTLESS THAT IT IS HARD TO SIT STILL: NOT AT ALL
7. FEELING AFRAID AS IF SOMETHING AWFUL MIGHT HAPPEN: NOT AT ALL
2. NOT BEING ABLE TO STOP OR CONTROL WORRYING: MORE THAN HALF THE DAYS

## 2024-11-14 ASSESSMENT — PAIN SCALES - GENERAL: PAINLEVEL_OUTOF10: NO PAIN (0)

## 2024-11-14 ASSESSMENT — PATIENT HEALTH QUESTIONNAIRE - PHQ9
5. POOR APPETITE OR OVEREATING: NOT AT ALL
SUM OF ALL RESPONSES TO PHQ QUESTIONS 1-9: 12

## 2024-11-14 NOTE — PATIENT INSTRUCTIONS
Melatonin tablets (NOT gummmies) of 3 to 5 mg about 1 hour before you want to fall asleep, adjust that time if needed.       Right now, what you can do is take 2 of the 10 mg tablets of the escitalopram (Lexapro) until those run out, then get the 20 mg tablets from the pharmacy and only take 1 of those daily.       Pediatric Dermatology  [address] [address]  [phone]    General Gentle Skin Care Recommendations  The products listed are not exclusive and generic products or others not on the list may be an okay substitute, but make sure they are fragrance free and reading labels is very important    Below is a list of products our providers recommend for gentle skin care.  Moisturizers:    Lighter; Cetaphil Cream, CeraVe Cream, Aveeno Positively Radiant, Pipette, Vanicream lotion    Thicker; Aquaphor Ointment, Vaseline, Petroleum Jelly, Eucerin Original Healing Cream, Vanicream Cream, CeraVe Healing Ointment, Aquaphor Body Spray, Vanicream    Lotions are too thin to provide adequate moisture to the skin. Thicker options such as creams or ointments are recommended  Mild Cleansers:    Dove- Fragrance Free bar or wash  CeraVe   Eucerin Baby  Vanicream Cleansing bar  Cetaphil Cleanser   Aquaphor 2 in1 Gentle Wash and Shampoo  Dove Baby wash  Pipette Fragrance Free  CLn wash        Laundry Products:    All Free and Clear  Cheer Free  Generic Brands are okay if they are  Fragrance Free      Avoid fabric softeners and dryer sheets. These add unnecessary chemicals to clothing Sunscreens: SPF 30 or greater     Choose mineral-based sunscreens with active ingredients of only Zinc Oxide and/or Titanium Dioxide   It is safe to apply a small amount of mineral-based sunscreen to sun-exposed skin on infants under 6 months of age (face, hands, etc.)     Examples:  Aveeno Active Natural Protection Mineral Block Lotion SPF 30  Blue Lizard for Sensitive Skin SPF 30+  Mustella  Broad Spectrum SPF 50+/Mineral Sunscreen Stick    Thinkbaby Safe Sunscreen SPF 50+  Vanicream Sunscreen for Sensitive Skin SPF 30 or 50  [employer] s Sensitive Skin SPF 70    Avoid spray sunscreens. Most contain chemical sunscreen ingredients and can be easily inhaled during application     Shampoo and Conditioners:  (Some baby washes may be used as a shampoo)    Free and Clear by Vanicream  Aquaphor 2 in 1 Gentle Wash and Shampoo  Pipette Baby Fragrance Free  Mustela Fragrance Free   Sheen Shampoo   CLn Shampoo    Oils:  Mineral Oil   Coconut (raw, unrefined, organic)   Sunflower seed oil     Avoid olive oil   Avoid essential oils (see below)         Why fragrance free?  Infant skin is thinner than adult skin and is more prone to irritation and absorption of fragrance or chemical ingredients. Fragrances can irritate the skin of infants and children with eczema.     Why avoid essential oils on the skin?  Essential oils like lavender are very concentrated and will be absorbed into an infant s skin.   Essential oils can be very irritating and cause severe rash on the skin   Lavender and other essential oils are commonly found in baby care products. When these products are applied repeatedly to the skin and/or occluded in the diaper region, this can enhance the risk for absorption or irritation.       What about  organic  or  natural  products?  Organic or natural does not mean  fragrance free  or gentle. In fact, many organic products are very irritating to the skin  Patients with sensitive skin may be sensitive to ingredients like fragrance, essential oils, or botanical extracts in these products.    Bathing and moisturization recommendations:  Bathe once daily. Soaking in a bath is more hydrating for the skin than a shower.  Keep bathing and showering to less than 15 minutes   Use warm water, but AVOID HOT or COLD water  DO NOT use bubble bath or other products which excessively foam. These strip moisture from the  skin  Limit the use of soaps, focusing on the skin folds, face, armpits, groin and feet.  Do NOT vigorously scrub when you cleanse the skin or use a loofa  After bathing, PAT your skin lightly with a towel. DO NOT rub or scrub when drying  ALWAYS apply moisturizer immediately after bathing. This helps to  lock in  the moisture.   Reapply moisturizers at least twice daily to your whole body   Your provider may recommend a lighter or heavier moisturizer based on your child s skin condition.  We recommend ointment-based moisturizers or thick creams. Avoid lotions as they are not thick enough to hydrate the skin and often contain irritating chemicals and preservatives  Lotions and thinner creams can sting and burn when applied. Ointment-based moisturizers are better tolerated when skin is inflamed or if there are open wounds.   If you were prescribed a topical medication, follow the instructions for application as provided by your pediatric dermatology provider, but typically these should be applied first before applying moisturizer    Other helpful tips:  Avoid scented products such as powders, perfumes, or colognes  Essential oil diffusers can be harsh on sensitive skin, use with caution   Avoid saunas and steam baths. (This temperature is too HOT)  Choose breathable clothing such as cotton or bamboo   Avoid tight or  scratchy  clothing such as wool or polyester   Always wash new clothing before wearing them for the first time  Sometimes a humidifier or vaporizer can be used at night to help the dry skin. Remember to keep these items clean to avoid mold growth

## 2024-11-14 NOTE — PROGRESS NOTES
"  Assessment & Plan     Recurrent major depressive disorder, in partial remission (H)  Patient is a very pleasant 84-year-old female who presents today for multiple concerns.  1 of these includes poor mental health control.  Her family members are noting that she is more short, having a hard time regulating her own emotions.  We discussed options including increasing the dose of Lexapro, and she was interested in this.  Follow-up early next year for mental health.  - escitalopram (LEXAPRO) 20 MG tablet  Dispense: 90 tablet; Refill: 3    Dry skin  Patient notes that she has been dealing with significant dry skin.  She does have nonconcerning amounts of dry skin today, no clear lesions.  We discussed importance of moisturization especially during the winter.  Also recommended oral hydration.  Given information in her after visit summary for gentle moisturizers.    Onychomycosis  Patient has thickened toenails.  Appears as onychomycosis.  Has been having difficulties with managing this at home alone or with the assistance of family members.  Referral to podiatry for management.  - Orthopedic  Referral    The longitudinal plan of care for the diagnosis(es)/condition(s) as documented were addressed during this visit. Due to the added complexity in care, I will continue to support Gail in the subsequent management and with ongoing continuity of care.  I spent a total of 37 minutes on the day of the visit.   Time spent by me today doing chart review, history and exam, documentation and further activities per the note      Subjective   Gail is a 84 year old, presenting for the following health issues:  Anxiety, Foot Problems (Red sometimes, check toenails), and Derm Problem        11/14/2024     2:01 PM   Additional Questions   Roomed by Hollie LOPEZ   Dry skin, was told int he past to use creams and not lotions.   Using \"gold bond rough and bumpy skin\" cream    Foot sometimes gets discolored, but she " "doesn't notice much.   Sees podiatry.     Anxiety   How are you doing with your anxiety since your last visit? Worsened   Are you having other symptoms that might be associated with anxiety? No  Have you had a significant life event? No   Are you feeling depressed? No  Do you have any concerns with your use of alcohol or other drugs? No    Social History     Tobacco Use    Smoking status: Never    Smokeless tobacco: Never   Vaping Use    Vaping status: Never Used   Substance Use Topics    Alcohol use: Yes     Comment: Occasional    Drug use: No         1/20/2022    10:21 AM 1/12/2024    10:22 AM 11/14/2024     2:08 PM   MARTIN-7 SCORE   Total Score 9 6 9         1/12/2024    10:22 AM 8/22/2024     9:46 AM 11/14/2024     2:08 PM   PHQ   PHQ-9 Total Score 4 15 12   Q9: Thoughts of better off dead/self-harm past 2 weeks Not at all Not at all  Not at all       Patient-reported     Hard time falling asleep  Restless.   \"Always been a bad sleeper\"   Hard time falling asleep, often times hard time staying asleep as well.     Review of Systems  Constitutional, HEENT, cardiovascular, pulmonary, gi and gu systems are negative, except as otherwise noted.      Objective    /80 (BP Location: Right arm, Patient Position: Sitting, Cuff Size: Adult Regular)   Pulse 54   Resp 16   Ht 1.613 m (5' 3.5\")   Wt 47.6 kg (105 lb)   SpO2 98%   BMI 18.31 kg/m    Body mass index is 18.31 kg/m .  Physical Exam  Constitutional:       Appearance: Normal appearance.   HENT:      Head: Normocephalic.   Eyes:      General: No scleral icterus.     Extraocular Movements: Extraocular movements intact.      Conjunctiva/sclera: Conjunctivae normal.   Cardiovascular:      Rate and Rhythm: Normal rate.   Pulmonary:      Effort: Pulmonary effort is normal.   Feet:      Comments: Thickened nails bilaterally, onychomycotic changes.   Skin:     Comments: Dry skin with very mild flaking, without lesions bilateral lower extremities.    Neurological:    "   General: No focal deficit present.      Mental Status: She is alert and oriented to person, place, and time.                  Signed Electronically by: Aisha Hull MD

## 2024-11-20 ENCOUNTER — MYC REFILL (OUTPATIENT)
Dept: FAMILY MEDICINE | Facility: CLINIC | Age: 84
End: 2024-11-20
Payer: COMMERCIAL

## 2024-11-20 DIAGNOSIS — R26.89 SHUFFLING GAIT: ICD-10-CM

## 2024-11-21 RX ORDER — CARBIDOPA AND LEVODOPA 25; 100 MG/1; MG/1
1 TABLET ORAL 2 TIMES DAILY
Qty: 180 TABLET | Refills: 1 | Status: SHIPPED | OUTPATIENT
Start: 2024-11-21

## 2024-12-23 ENCOUNTER — PATIENT OUTREACH (OUTPATIENT)
Dept: CARE COORDINATION | Facility: CLINIC | Age: 84
End: 2024-12-23
Payer: COMMERCIAL

## 2024-12-24 ENCOUNTER — PATIENT OUTREACH (OUTPATIENT)
Dept: CARE COORDINATION | Facility: CLINIC | Age: 84
End: 2024-12-24
Payer: COMMERCIAL

## 2025-04-22 ENCOUNTER — MYC REFILL (OUTPATIENT)
Dept: FAMILY MEDICINE | Facility: CLINIC | Age: 85
End: 2025-04-22
Payer: COMMERCIAL

## 2025-04-22 DIAGNOSIS — I48.91 RAPID ATRIAL FIBRILLATION (H): ICD-10-CM

## 2025-05-13 DIAGNOSIS — R26.89 SHUFFLING GAIT: ICD-10-CM

## 2025-05-13 RX ORDER — CARBIDOPA AND LEVODOPA 25; 100 MG/1; MG/1
1 TABLET ORAL 2 TIMES DAILY
Qty: 180 TABLET | Refills: 1 | Status: SHIPPED | OUTPATIENT
Start: 2025-05-13

## 2025-07-15 ENCOUNTER — MYC REFILL (OUTPATIENT)
Dept: FAMILY MEDICINE | Facility: CLINIC | Age: 85
End: 2025-07-15
Payer: COMMERCIAL

## 2025-07-15 DIAGNOSIS — I48.91 RAPID ATRIAL FIBRILLATION (H): ICD-10-CM

## 2025-07-31 ENCOUNTER — HOSPITAL ENCOUNTER (OUTPATIENT)
Facility: CLINIC | Age: 85
Setting detail: OBSERVATION
End: 2025-07-31
Attending: EMERGENCY MEDICINE | Admitting: STUDENT IN AN ORGANIZED HEALTH CARE EDUCATION/TRAINING PROGRAM
Payer: COMMERCIAL

## 2025-07-31 PROBLEM — Z86.73 HISTORY OF STROKE: Status: ACTIVE | Noted: 2024-08-12

## 2025-07-31 PROBLEM — S32.591A CLOSED FRACTURE OF MULTIPLE RAMI OF RIGHT PUBIS, INITIAL ENCOUNTER (H): Status: ACTIVE | Noted: 2025-07-31

## 2025-07-31 PROBLEM — S32.10XA CLOSED FRACTURE OF SACRUM (H): Status: ACTIVE | Noted: 2024-08-12

## 2025-07-31 PROBLEM — I48.91 RAPID ATRIAL FIBRILLATION (H): Status: ACTIVE | Noted: 2024-08-12

## 2025-07-31 PROBLEM — I63.411 CEREBROVASCULAR ACCIDENT (CVA) DUE TO EMBOLISM OF RIGHT MIDDLE CEREBRAL ARTERY (H): Status: ACTIVE | Noted: 2024-08-13

## 2025-07-31 ASSESSMENT — ACTIVITIES OF DAILY LIVING (ADL)
ADLS_ACUITY_SCORE: 55
ADLS_ACUITY_SCORE: 55
ADLS_ACUITY_SCORE: 38
ADLS_ACUITY_SCORE: 55
ADLS_ACUITY_SCORE: 38
ADLS_ACUITY_SCORE: 38
ADLS_ACUITY_SCORE: 55

## 2025-07-31 ASSESSMENT — COLUMBIA-SUICIDE SEVERITY RATING SCALE - C-SSRS
2. HAVE YOU ACTUALLY HAD ANY THOUGHTS OF KILLING YOURSELF IN THE PAST MONTH?: NO
6. HAVE YOU EVER DONE ANYTHING, STARTED TO DO ANYTHING, OR PREPARED TO DO ANYTHING TO END YOUR LIFE?: NO
1. IN THE PAST MONTH, HAVE YOU WISHED YOU WERE DEAD OR WISHED YOU COULD GO TO SLEEP AND NOT WAKE UP?: NO

## 2025-07-31 NOTE — ED TRIAGE NOTES
Triage Assessment (Adult)       Row Name 07/31/25 1428          Triage Assessment    Airway WDL WDL        Respiratory WDL    Respiratory WDL WDL        Skin Circulation/Temperature WDL    Skin Circulation/Temperature WDL WDL        Cardiac WDL    Cardiac WDL WDL        Peripheral/Neurovascular WDL    Peripheral Neurovascular WDL X;pulse assessment     Pulse Assessment dorsalis pedis        Pulse Dorsalis Pedis    Left Dorsalis Pedis Pulse 1+ (weak)     Right Dorsalis Pedis Pulse 1+ (weak)        Cognitive/Neuro/Behavioral WDL    Cognitive/Neuro/Behavioral WDL WDL

## 2025-07-31 NOTE — ED TRIAGE NOTES
FALLL THIS MORNING AROUND 10 AM ON RIGHT SIDE. HIT HEAD.      Triage Assessment (Adult)       Row Name 07/31/25 1428          Triage Assessment    Airway WDL WDL        Respiratory WDL    Respiratory WDL WDL        Skin Circulation/Temperature WDL    Skin Circulation/Temperature WDL WDL        Cardiac WDL    Cardiac WDL WDL        Peripheral/Neurovascular WDL    Peripheral Neurovascular WDL X;pulse assessment     Pulse Assessment dorsalis pedis        Pulse Dorsalis Pedis    Left Dorsalis Pedis Pulse 1+ (weak)     Right Dorsalis Pedis Pulse 1+ (weak)        Cognitive/Neuro/Behavioral WDL    Cognitive/Neuro/Behavioral WDL WDL

## 2025-07-31 NOTE — ED PROVIDER NOTES
ED Provider Note  [facility]      History     Chief Complaint   Patient presents with    Hip Pain     Fall, right hip pain. No LOC, no blood thinners     HPI  Gail Cuba is a 85 year old female who presents to the emergency department via EMS for concerns regarding fall, with hip pain.  EMS was not present upon my arrival to the room, and nursing staff did state that patient had a fall, and not on blood thinning medication.  However, after review of medical records, and clarification with the patient, it does appear that patient is anticoagulated on Xarelto.  Did have recent refill request of Xarelto medication.    Furthermore, I did review October, 2024 neurology visit during which time patient was seen for neurocognitive disorder, with history of stroke, and was continued on Xarelto at that time.  Patient with clinical diagnosis of Parkinson's disease, and has been started on Sinemet.      Patient presents today because she was reaching for a item on a shelf in a cabinet.  She lost her balance, and subsequently fell to the carpeted floor.  Patient states she did not hit her head, and did not lose consciousness, however was unable to walk secondary to pain of the right hip after the fall, which occurred at 10 AM, about 4.5 hours prior to ED arrival.  The patient was able to scoot, and with the assistance of a neighbor was able to get back up into a chair, however patient was unable to walk secondary to the pain, and therefore EMS was called.  No medications taken prior to ED arrival.  Complains of slight headache in addition to neck pain, and right hip pain currently.  No numbness, or tingling.  No injury elsewhere.            Independent Historian:        Review of External Notes:  As above  I reviewed November, 2020 for clinic visit.  Patient was seen for multiple concerns at that time including poor mental health.  Was started on Lexapro.      Allergies:  Allergies   Allergen  Reactions    Latex      Blistery rash from gloves    Nkda [No Known Drug Allergy]        Problem List:    Patient Active Problem List    Diagnosis Date Noted    Closed fracture of multiple rami of right pubis, initial encounter (H) 07/31/2025     Priority: Medium    Cerebrovascular accident (CVA) due to embolism of right middle cerebral artery (H) 08/13/2024     Priority: Medium    Abnormal head CT 08/12/2024     Priority: Medium    Rapid atrial fibrillation (H) 08/12/2024     Priority: Medium    Abnormal CT scan, pelvis 08/12/2024     Priority: Medium    History of stroke 08/12/2024     Priority: Medium    Fall 08/12/2024     Priority: Medium    Closed fracture of sacrum (H) 08/12/2024     Priority: Medium    Vitamin B12 deficiency (non anemic) 01/13/2024     Priority: Medium    Recurrent major depressive disorder, in partial remission 01/20/2022     Priority: Medium    Osteoporosis 06/15/2015     Priority: Medium    Endometrial mass 12/20/2013     Priority: Medium     D and C,hysteroscopy, polypectomy  Stenotic os in clinic-cytotec 200mcg night prior      Anxiety 03/05/2013     Priority: Medium    Urge incontinence of urine 07/11/2011     Priority: Medium    Hyperlipidemia LDL goal <130 10/31/2010     Priority: Medium     Recent Labs   Lab Test 9/23/09 0908 6/29/07 0749    CHOL 194 247*    HDL 44* 57    LDL 99 142*    TRIG 252* 239*    CHOLHDLRATIO 4.0 4.0           THICKENING ENDOMETRIUM 05/06/2010     Priority: Medium    Personal history of malignant neoplasm of breast 12/29/2008     Priority: Medium     Infiltrating ductal cancer  - T1 N0  2/19/09 - started XRT in 26 fractions, with boost with at end to cavity.          Past Medical History:    Past Medical History:   Diagnosis Date    Anxiety     Breast cancer (H) 2009    Breast mass     Chest pain     Hypertension     MEDICAL HISTORY OF - 6-08    Urge incontinence        Past Surgical History:    Past Surgical History:   Procedure Laterality Date    BIOPSY  BREAST      BREAST LUMPECTOMY, RT/LT  1-26-09    Breast Lumpectomy RT    COLONOSCOPY  7/25/2013    Procedure: COLONOSCOPY;  Colonoscopy  ;  Surgeon: Deep Purcell MD;  Location: [facility]    D & C  5/24/10    for EMB     DILATION AND CURETTAGE, OPERATIVE HYSTEROSCOPY, COMBINED  1/14/2014    Procedure: COMBINED DILATION AND CURETTAGE, OPERATIVE HYSTEROSCOPY;  Hysteroscopy with dilation and curettage, possible polypectomy- choice anes  ;  Surgeon: Lauryn Hoffman MD;  Location: [facility]    HC EXCISION BREAST LESION, OPEN >=1  6-13-08    with sentinal node biopsy    LUMPECTOMY BREAST  2009    right breast    PHACOEMULSIFICATION WITH STANDARD INTRAOCULAR LENS IMPLANT Left 2/12/2020    Procedure: Cataract Removal with Implant;  Surgeon: Jose Cerrato MD;  Location: [facility]    PHACOEMULSIFICATION WITH STANDARD INTRAOCULAR LENS IMPLANT Right 3/4/2020    Procedure: Cataract Removal with Implant;  Surgeon: Jose Cerrato MD;  Location: [facility]    SURGICAL HISTORY OF -       Neck surgery.    SURGICAL HISTORY OF -   11/17/09    L4-5 back surgery       Family History:    Family History   Problem Relation Age of Onset    Alcohol/Drug Mother         alcohalic    Hypertension Mother     Cerebrovascular Disease Mother     Thyroid Disease Sister     Hyperlipidemia Sister     Depression Sister     Anxiety Disorder Sister     Osteoporosis Sister     Musculoskeletal Disorder Son     Hypertension Son     Diabetes Father     Prostate Cancer Father     Cancer - colorectal Maternal Grandfather     Diabetes Brother     Depression Brother     Asthma No family hx of     C.A.D. No family hx of     Breast Cancer No family hx of     Anesthesia Reaction No family hx of     Blood Disease No family hx of     Ovarian Cancer No family hx of        Social History:  Marital Status:  Single [1]  Social History     Tobacco Use    Smoking status: Never    Smokeless tobacco: Never   Vaping Use    Vaping status: Never Used    Substance Use Topics    Alcohol use: Yes     Comment: Occasional    Drug use: No        Medications:    carbidopa-levodopa (SINEMET)  MG tablet  escitalopram (LEXAPRO) 20 MG tablet  metoprolol tartrate (LOPRESSOR) 25 MG tablet  rivaroxaban ANTICOAGULANT (XARELTO) 20 MG TABS tablet  acetaminophen (TYLENOL) 500 MG tablet          Review of Systems  A medically appropriate review of systems was performed with pertinent positives and negatives noted in the HPI, and all other systems negative.    Physical Exam   Patient Vitals for the past 24 hrs:   BP Temp Temp src Pulse Resp SpO2   07/31/25 1848 -- -- -- (!) 128 15 --   07/31/25 1818 (!) 130/100 -- -- (!) 141 28 92 %   07/31/25 1802 -- -- -- -- -- 94 %   07/31/25 1800 (!) 126/98 -- -- (!) 141 -- --   07/31/25 1543 -- -- -- 105 18 94 %   07/31/25 1436 -- 97.9  F (36.6  C) Oral -- -- --          Physical Exam  General: alert and in acute distress on arrival  Head: atraumatic, normocephalic  Lungs:  nonlabored  CV:  extremities warm and perfused  Abd: nondistended  Pain with any range of motion of the right hip  Skin: no rashes, no diaphoresis and skin color normal  Neuro: Patient awake, alert, speech is fluent,   Psychiatric: affect/mood normal,        ED Course                 Procedures         EKG, reviewed by myself has narrow complex tachyarrhythmia.  Uncertain if this truly is sinus, however does appear to have P waves in lead V1.  Therefore, appears to be sinus tachycardia.  Rate 140 bpm.  Nonspecific QO-E-furqosl changes.  No acute ischemic appearing changes.         Trauma Summary Disposition     Patient is trauma admission:  Trauma  Evaluation      Spine  Backboard removal time: Backboard not applied   C-collar and immobilization: not indicated, cleared.  CSpine Clearance: C spine cleared clinically  Full Primary and Secondary survey with appropriate immobilization of spine completed in exam section.     Neuro  GCS at arrival:  Motor 6=Obeys commands    Verbal 5=Oriented   Eye Opening 4=Spontaneous   Total: 15     GCS at disposition: unchanged       None         Recent Results (from the past 24 hours)   ABO/Rh type and screen *Canceled*    Narrative    The following orders were created for panel order ABO/Rh type and screen.  Procedure                               Abnormality         Status                     ---------                               -----------         ------                       Please view results for these tests on the individual orders.   CBC with Platelets and Differential (Limited Occurrences)    Narrative    The following orders were created for panel order CBC with Platelets and Differential (Limited Occurrences).  Procedure                               Abnormality         Status                     ---------                               -----------         ------                     CBC with platelets and ...[[id number]]                      Final result                 Please view results for these tests on the individual orders.   Basic Metabolic Panel (Limited Occurrences)   Result Value Ref Range    Sodium 137 135 - 145 mmol/L    Potassium 5.0 3.4 - 5.3 mmol/L    Chloride 103 98 - 107 mmol/L    Carbon Dioxide (CO2) 22 22 - 29 mmol/L    Anion Gap 12 7 - 15 mmol/L    Urea Nitrogen 13.2 8.0 - 23.0 mg/dL    Creatinine 0.72 0.51 - 0.95 mg/dL    GFR Estimate 81 >60 mL/min/1.73m2    Calcium 9.2 8.8 - 10.4 mg/dL    Glucose 96 70 - 99 mg/dL   ABO/Rh type and screen *Canceled*    Narrative    The following orders were created for panel order ABO/Rh type and screen.  Procedure                               Abnormality         Status                     ---------                               -----------         ------                     Adult Type and Screen[[id number]]                                                        Please view results for these tests on the individual orders.   CBC with platelets and differential   Result Value  Ref Range    WBC Count 9.6 4.0 - 11.0 10e3/uL    RBC Count 4.55 3.80 - 5.20 10e6/uL    Hemoglobin 13.1 11.7 - 15.7 g/dL    Hematocrit 41.1 35.0 - 47.0 %    MCV 90 78 - 100 fL    MCH 28.8 26.5 - 33.0 pg    MCHC 31.9 31.5 - 36.5 g/dL    RDW 12.8 10.0 - 15.0 %    Platelet Count 217 150 - 450 10e3/uL    % Neutrophils 79 %    % Lymphocytes 13 %    % Monocytes 7 %    % Eosinophils 1 %    % Basophils 0 %    % Immature Granulocytes 0 %    NRBCs per 100 WBC 0 <1 /100    Absolute Neutrophils 7.6 1.6 - 8.3 10e3/uL    Absolute Lymphocytes 1.3 0.8 - 5.3 10e3/uL    Absolute Monocytes 0.7 0.0 - 1.3 10e3/uL    Absolute Eosinophils 0.1 0.0 - 0.7 10e3/uL    Absolute Basophils 0.0 0.0 - 0.2 10e3/uL    Absolute Immature Granulocytes 0.0 <=0.4 10e3/uL    Absolute NRBCs 0.0 10e3/uL   [place] Draw    Narrative    The following orders were created for panel order [place] Draw.  Procedure                               Abnormality         Status                     ---------                               -----------         ------                     Extra Blue Top Tube[[id number]]                             Final result               Extra Red Top Tube[[id number]]                                                         Extra Green Top (Lithiu...[[id number]]                                                 Extra Purple Top Tube[[id number]]                                                        Please view results for these tests on the individual orders.   Extra Blue Top Tube   Result Value Ref Range    Hold Specimen JIC    ABO/Rh type and screen    Narrative    The following orders were created for panel order ABO/Rh type and screen.  Procedure                               Abnormality         Status                     ---------                               -----------         ------                     Adult Type and Screen[[id number]]                           Final result                 Please view results for these tests on the  individual orders.   Adult Type and Screen   Result Value Ref Range    ABO/RH(D) B POS     Antibody Screen Negative Negative    SPECIMEN EXPIRATION DATE 8/3/2025 11:59:00 PM CDT    Head CT w/o contrast    Narrative    EXAM: CT HEAD W/O CONTRAST, CT CERVICAL SPINE W/O CONTRAST  LOCATION: [facility]  DATE: 7/31/2025    INDICATION: fall with headache.  on xarelto  COMPARISON: August 12, 2024.  TECHNIQUE:   1) Routine CT Head without IV contrast. Multiplanar reformats. Dose reduction techniques were used.  2) Routine CT Cervical Spine without IV contrast. Multiplanar reformats. Dose reduction techniques were used.    FINDINGS:   HEAD CT:   INTRACRANIAL CONTENTS: No intracranial hemorrhage, extraaxial collection, or mass effect.  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 Remote right basal ganglia lacunar infarct. Mild presumed chronic   small vessel ischemic changes. Mild to moderate generalized volume loss. No hydrocephalus.     VISUALIZED ORBITS/SINUSES/MASTOIDS: No intraorbital abnormality. No paranasal sinus mucosal disease. No middle ear or mastoid effusion.    BONES/SOFT TISSUES: No acute abnormality.    CERVICAL SPINE CT:   VERTEBRA: Grade 1 anterolisthesis C3 on C4 and C4 on C5 measuring approximately 2 mm. Trace retrolisthesis C5 on C6. No fracture or posttraumatic subluxation.     CANAL/FORAMINA: No severe spinal canal stenosis.. Foraminal stenosis is severe on the left at C6-C7.    PARASPINAL: No extraspinal abnormality. Visualized lung fields are clear.      Impression    IMPRESSION:  HEAD CT:  1.  No acute intracranial process.  2.  Chronic changes as above.    CERVICAL SPINE CT:  1.  No CT evidence for acute fracture or post traumatic subluxation.     CT Cervical Spine w/o Contrast    Narrative    EXAM: CT HEAD W/O CONTRAST, CT CERVICAL SPINE W/O CONTRAST  LOCATION: [facility]  DATE:  7/31/2025    INDICATION: fall with headache.  on xarelto  COMPARISON: August 12, 2024.  TECHNIQUE:   1) Routine CT Head without IV contrast. Multiplanar reformats. Dose reduction techniques were used.  2) Routine CT Cervical Spine without IV contrast. Multiplanar reformats. Dose reduction techniques were used.    FINDINGS:   HEAD CT:   INTRACRANIAL CONTENTS: No intracranial hemorrhage, extraaxial collection, or mass effect.  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 Remote right basal ganglia lacunar infarct. Mild presumed chronic   small vessel ischemic changes. Mild to moderate generalized volume loss. No hydrocephalus.     VISUALIZED ORBITS/SINUSES/MASTOIDS: No intraorbital abnormality. No paranasal sinus mucosal disease. No middle ear or mastoid effusion.    BONES/SOFT TISSUES: No acute abnormality.    CERVICAL SPINE CT:   VERTEBRA: Grade 1 anterolisthesis C3 on C4 and C4 on C5 measuring approximately 2 mm. Trace retrolisthesis C5 on C6. No fracture or posttraumatic subluxation.     CANAL/FORAMINA: No severe spinal canal stenosis.. Foraminal stenosis is severe on the left at C6-C7.    PARASPINAL: No extraspinal abnormality. Visualized lung fields are clear.      Impression    IMPRESSION:  HEAD CT:  1.  No acute intracranial process.  2.  Chronic changes as above.    CERVICAL SPINE CT:  1.  No CT evidence for acute fracture or post traumatic subluxation.     Pelvis XR w/ unilateral hip right    Narrative    EXAM: XR PELVIS AND HIP RIGHT 1 VIEW  LOCATION: [facility]  DATE: 7/31/2025    INDICATION: fall with pain  COMPARISON: None.      Impression    IMPRESSION: Normal joint spaces and alignment. No fracture. Osteopenia.   CT Hip Right w/o Contrast    Narrative    EXAM: CT HIP RIGHT W/O CONTRAST  LOCATION: [facility]  DATE: 7/31/2025    INDICATION: Fall with right hip pain. X-ray negative.  COMPARISON:  Same-day radiograph.  TECHNIQUE: Noncontrast. Axial, sagittal and coronal thin-section reconstruction. Dose reduction techniques were used.     FINDINGS:   Small field-of-view right hip images, as well as axial large field-of-view images extending from the midportion of the right distally to the proximal thigh are available for interpretation. The sacrum is not assessed.    Bones are demineralized. Acute mildly displaced fracture right inferior pubic ramus. Nondisplaced fracture right superior pubic root at the puboacetabular junction.    Right hip joint space is maintained. There is no dislocation.    Soft tissue contusion lateral subcutaneous tissues of the right hip.    Scattered atherosclerotic vascular calcifications. Sigmoid colon diverticulosis.      Impression    IMPRESSION:  1.  Acute mildly displaced right inferior pubic ramus fracture.  2.  Acute nondisplaced fracture right superior pubic root at the puboacetabular junction.  3.  Bone demineralization.      NOTE: ABNORMAL REPORT    THE DICTATION ABOVE DESCRIBES AN ABNORMALITY FOR WHICH FOLLOW-UP IS NEEDED.      EKG 12-lead, tracing only   Result Value Ref Range    Systolic Blood Pressure  mmHg    Diastolic Blood Pressure  mmHg    Ventricular Rate 140 BPM    Atrial Rate 140 BPM    NH Interval 124 ms    QRS Duration 74 ms     ms    QTc 485 ms    P Axis 100 degrees    R AXIS -18 degrees    T Axis 221 degrees    Interpretation ECG       Sinus tachycardia  ST & T wave abnormality, consider inferior ischemia  ST & T wave abnormality, consider anterolateral ischemia  Abnormal ECG  When compared with ECG of 26-Jan-2009 07:40,  MANUAL COMPARISON REQUIRED PREVIOUS ECG IS INCOMPATIBLE     Troponin T, High Sensitivity   Result Value Ref Range    Troponin T, High Sensitivity 8 <=14 ng/L       MEDICATIONS GIVEN IN THE EMERGENCY DEPARTMENT:  Medications   ondansetron (ZOFRAN) injection 4 mg (has no administration in time range)   HYDROmorphone (PF) (DILAUDID)  injection 0.3 mg (0.3 mg Intravenous $Given 7/31/25 1543)   carbidopa-levodopa (SINEMET)  MG per tablet 1 tablet (1 tablet Oral $Given 7/31/25 1834)   escitalopram (LEXAPRO) tablet 20 mg (20 mg Oral $Given 7/31/25 1834)   metoprolol tartrate (LOPRESSOR) half-tab 12.5 mg (12.5 mg Oral $Given 7/31/25 1834)   calcium carbonate (TUMS) chewable tablet 1,000 mg (has no administration in time range)   acetaminophen (TYLENOL) tablet 650 mg (has no administration in time range)   metoprolol (LOPRESSOR) injection 5 mg (5 mg Intravenous $Given 7/31/25 1812)           Independent Interpretation (X-rays, CTs, rhythm strip):  X-ray images personally reviewed.  Questionable deformity of the inferior pubic ramus.  Radiology report also reviewed.  No evidence of hip fracture.    CT images of the head and cervical spine are also personally reviewed.  No acute posttraumatic findings.    Consultations/Discussion of Management or Tests:         Social Determinants of Health affecting care:         Assessments & Plan (with Medical Decision Making)  85 year old female who presents to the Emergency Department for evaluation of fall.  Patient appears to have had mechanical type fall after reaching for something on the shelf.  She lost her balance, falling to the ground, landing on her hip.  Difficulty bearing weight ever since the fall.  No injury elsewhere.  Questionable hitting her head.  CT scan of the head and cervical spine are negative.  SHe is anticoagulated on rivaroxaban.    Patient ultimately with no evidence of acute intracranial finding after CT scan of the head and cervical spine were performed in the context of her fall on anticoagulants.  This appears to have been more of a mechanical type fall after patient was reaching for object on the shelf, falling, and landing on the right hip.  Unable to walk after the fall.  Initial x-rays had been read as negative, however based on the severity of pain, with pain with any  movement of the right leg, decision made for CT imaging.  CT does show right superior and inferior pubic rami fracture.  Patient not able to ambulate, and lives at home alone, and therefore I feel patient requires hospitalization for observation, physical therapy, Occupational Therapy, with potential placement related discussion.    Notably, patient also with tachyarrhythmia.  Does have history of rapid A-fib before in the past.  She is anticoagulated on rivaroxaban.  Denies any chest pain, or shortness of breath.  No lightheadedness, or dizziness.  She did not take her medications this morning, and therefore IV in addition to oral metoprolol is administered.  Patient did have slight improvement of heart rates down to the 100 to 120 bpm range.    Given the tachyarrhythmia, in addition to the pubic rami fracture, I do feel patient requires hospitalization.    I did discuss with Dr. Copeland, the pubic provider, and orthopedics will evaluate the patient tomorrow.  Also I discussed with hospitalist, Miriam Platt PA-C, who agreed to accept patient for hospital observation.       I have reviewed the nursing notes.    I have reviewed the findings, diagnosis, plan and need for follow up with the patient.           NEW PRESCRIPTIONS STARTED AT TODAY'S ER VISIT  New Prescriptions    No medications on file       Final diagnoses:   Closed fracture of multiple rami of right pubis, initial encounter (H)   Fall, initial encounter   Rapid atrial fibrillation (H)   Anticoagulated       7/31/2025   [facility] EMERGENCY DEPT       Francois Dale MD  07/31/25 [id number]

## 2025-07-31 NOTE — ED NOTES
[facility]   Admission Handoff    The patient is Gail Cuba, 85 year old who arrived in the ED by AMBULANCE from home with a complaint of Hip Pain (Fall, right hip pain. No LOC, no blood thinners)  . The patient's current symptoms are new and during this time the symptoms have decreased. In the ED, patient was diagnosed with   Final diagnoses:   Closed fracture of multiple rami of right pubis, initial encounter (H)   Fall, initial encounter   Rapid atrial fibrillation (H)   Anticoagulated         Needed?: No    Allergies:    Allergies   Allergen Reactions    Latex      Blistery rash from gloves    Nkda [No Known Drug Allergy]        Past Medical Hx:   Past Medical History:   Diagnosis Date    Anxiety     Breast cancer (H) 2009    Right breast cancer    Breast mass     Chest pain     Hypertension     MEDICAL HISTORY OF - 6-08    Rt breast cancer treated with radiation    Urge incontinence        Initial vitals were: BP: (!) 126/98  Pulse: 105  Temp: 97.9  F (36.6  C)  Resp: 18  SpO2: 94 %   Recent vital Signs: BP (!) 130/100   Pulse (!) 128   Temp 97.9  F (36.6  C) (Oral)   Resp 15   SpO2 92%     Elimination Status: Continent: external catheter.     Activity Level: 2 assist    Fall Status: Reason for falls risk:  Mobility and Reason for falls risk: High Risk Medications  bed/chair alarm on, nonskid shoes/slippers when out of bed, arm band in place, patient and family education, assistive device/personal items within reach, activity supervised, mobility aid in reach, and room door open    Baseline Mental status: WDL  Current Mental Status changes: at basesline    Infection present or suspected this encounter: no  Sepsis suspected: No    Isolation type: NA    Bariatric equipment needed?: No    In the ED these meds were given:   Medications   ondansetron (ZOFRAN) injection 4 mg (has no administration in time range)   HYDROmorphone (PF) (DILAUDID) injection 0.3 mg (0.3 mg  Intravenous $Given 7/31/25 1543)   carbidopa-levodopa (SINEMET)  MG per tablet 1 tablet (1 tablet Oral $Given 7/31/25 1834)   escitalopram (LEXAPRO) tablet 20 mg (20 mg Oral $Given 7/31/25 1834)   metoprolol tartrate (LOPRESSOR) half-tab 12.5 mg (12.5 mg Oral $Given 7/31/25 1834)   calcium carbonate (TUMS) chewable tablet 1,000 mg (has no administration in time range)   acetaminophen (TYLENOL) tablet 650 mg (has no administration in time range)   metoprolol (LOPRESSOR) injection 5 mg (5 mg Intravenous $Given 7/31/25 1812)       Drips running?  No    Home pump  No    Current LDAs: Peripheral IV: Site left arm ; Gauge 18  none     Results:   Labs/Imaging  Ordered and Resulted from Time of ED Arrival Up to the Time of Departure from the ED  Recent Results (from the past 24 hours)   ABO/Rh type and screen *Canceled*    Narrative    The following orders were created for panel order ABO/Rh type and screen.  Procedure                               Abnormality         Status                     ---------                               -----------         ------                       Please view results for these tests on the individual orders.   CBC with Platelets and Differential (Limited Occurrences)    Narrative    The following orders were created for panel order CBC with Platelets and Differential (Limited Occurrences).  Procedure                               Abnormality         Status                     ---------                               -----------         ------                     CBC with platelets and ...[[id number]]                      Final result                 Please view results for these tests on the individual orders.   Basic Metabolic Panel (Limited Occurrences)   Result Value Ref Range    Sodium 137 135 - 145 mmol/L    Potassium 5.0 3.4 - 5.3 mmol/L    Chloride 103 98 - 107 mmol/L    Carbon Dioxide (CO2) 22 22 - 29 mmol/L    Anion Gap 12 7 - 15 mmol/L    Urea Nitrogen 13.2 8.0 - 23.0 mg/dL     Creatinine 0.72 0.51 - 0.95 mg/dL    GFR Estimate 81 >60 mL/min/1.73m2    Calcium 9.2 8.8 - 10.4 mg/dL    Glucose 96 70 - 99 mg/dL   ABO/Rh type and screen *Canceled*    Narrative    The following orders were created for panel order ABO/Rh type and screen.  Procedure                               Abnormality         Status                     ---------                               -----------         ------                     Adult Type and Screen[[id number]]                                                        Please view results for these tests on the individual orders.   CBC with platelets and differential   Result Value Ref Range    WBC Count 9.6 4.0 - 11.0 10e3/uL    RBC Count 4.55 3.80 - 5.20 10e6/uL    Hemoglobin 13.1 11.7 - 15.7 g/dL    Hematocrit 41.1 35.0 - 47.0 %    MCV 90 78 - 100 fL    MCH 28.8 26.5 - 33.0 pg    MCHC 31.9 31.5 - 36.5 g/dL    RDW 12.8 10.0 - 15.0 %    Platelet Count 217 150 - 450 10e3/uL    % Neutrophils 79 %    % Lymphocytes 13 %    % Monocytes 7 %    % Eosinophils 1 %    % Basophils 0 %    % Immature Granulocytes 0 %    NRBCs per 100 WBC 0 <1 /100    Absolute Neutrophils 7.6 1.6 - 8.3 10e3/uL    Absolute Lymphocytes 1.3 0.8 - 5.3 10e3/uL    Absolute Monocytes 0.7 0.0 - 1.3 10e3/uL    Absolute Eosinophils 0.1 0.0 - 0.7 10e3/uL    Absolute Basophils 0.0 0.0 - 0.2 10e3/uL    Absolute Immature Granulocytes 0.0 <=0.4 10e3/uL    Absolute NRBCs 0.0 10e3/uL   [place] Draw    Narrative    The following orders were created for panel order [place] Draw.  Procedure                               Abnormality         Status                     ---------                               -----------         ------                     Extra Blue Top Tube[[id number]]                             Final result               Extra Red Top Tube[[id number]]                                                         Extra Green Top (Lithiu...[[id number]]                                                 Extra  Purple Top Tube[[id number]]                                                        Please view results for these tests on the individual orders.   Extra Blue Top Tube   Result Value Ref Range    Hold Specimen JIC    ABO/Rh type and screen    Narrative    The following orders were created for panel order ABO/Rh type and screen.  Procedure                               Abnormality         Status                     ---------                               -----------         ------                     Adult Type and Screen[[id number]]                           Final result                 Please view results for these tests on the individual orders.   Adult Type and Screen   Result Value Ref Range    ABO/RH(D) B POS     Antibody Screen Negative Negative    SPECIMEN EXPIRATION DATE 8/3/2025 11:59:00 PM CDT    Head CT w/o contrast    Narrative    EXAM: CT HEAD W/O CONTRAST, CT CERVICAL SPINE W/O CONTRAST  LOCATION: [facility]  DATE: 7/31/2025    INDICATION: fall with headache.  on xarelto  COMPARISON: August 12, 2024.  TECHNIQUE:   1) Routine CT Head without IV contrast. Multiplanar reformats. Dose reduction techniques were used.  2) Routine CT Cervical Spine without IV contrast. Multiplanar reformats. Dose reduction techniques were used.    FINDINGS:   HEAD CT:   INTRACRANIAL CONTENTS: No intracranial hemorrhage, extraaxial collection, or mass effect.  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 Remote right basal ganglia lacunar infarct. Mild presumed chronic   small vessel ischemic changes. Mild to moderate generalized volume loss. No hydrocephalus.     VISUALIZED ORBITS/SINUSES/MASTOIDS: No intraorbital abnormality. No paranasal sinus mucosal disease. No middle ear or mastoid effusion.    BONES/SOFT TISSUES: No acute abnormality.    CERVICAL SPINE CT:   VERTEBRA: Grade 1 anterolisthesis C3 on C4 and C4 on C5 measuring approximately 2 mm.  Trace retrolisthesis C5 on C6. No fracture or posttraumatic subluxation.     CANAL/FORAMINA: No severe spinal canal stenosis.. Foraminal stenosis is severe on the left at C6-C7.    PARASPINAL: No extraspinal abnormality. Visualized lung fields are clear.      Impression    IMPRESSION:  HEAD CT:  1.  No acute intracranial process.  2.  Chronic changes as above.    CERVICAL SPINE CT:  1.  No CT evidence for acute fracture or post traumatic subluxation.     CT Cervical Spine w/o Contrast    Narrative    EXAM: CT HEAD W/O CONTRAST, CT CERVICAL SPINE W/O CONTRAST  LOCATION: [facility]  DATE: 7/31/2025    INDICATION: fall with headache.  on xarelto  COMPARISON: August 12, 2024.  TECHNIQUE:   1) Routine CT Head without IV contrast. Multiplanar reformats. Dose reduction techniques were used.  2) Routine CT Cervical Spine without IV contrast. Multiplanar reformats. Dose reduction techniques were used.    FINDINGS:   HEAD CT:   INTRACRANIAL CONTENTS: No intracranial hemorrhage, extraaxial collection, or mass effect.  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 Remote right basal ganglia lacunar infarct. Mild presumed chronic   small vessel ischemic changes. Mild to moderate generalized volume loss. No hydrocephalus.     VISUALIZED ORBITS/SINUSES/MASTOIDS: No intraorbital abnormality. No paranasal sinus mucosal disease. No middle ear or mastoid effusion.    BONES/SOFT TISSUES: No acute abnormality.    CERVICAL SPINE CT:   VERTEBRA: Grade 1 anterolisthesis C3 on C4 and C4 on C5 measuring approximately 2 mm. Trace retrolisthesis C5 on C6. No fracture or posttraumatic subluxation.     CANAL/FORAMINA: No severe spinal canal stenosis.. Foraminal stenosis is severe on the left at C6-C7.    PARASPINAL: No extraspinal abnormality. Visualized lung fields are clear.      Impression    IMPRESSION:  HEAD CT:  1.  No acute intracranial process.  2.  Chronic  changes as above.    CERVICAL SPINE CT:  1.  No CT evidence for acute fracture or post traumatic subluxation.     Pelvis XR w/ unilateral hip right    Narrative    EXAM: XR PELVIS AND HIP RIGHT 1 VIEW  LOCATION: [facility]  DATE: 7/31/2025    INDICATION: fall with pain  COMPARISON: None.      Impression    IMPRESSION: Normal joint spaces and alignment. No fracture. Osteopenia.   CT Hip Right w/o Contrast    Narrative    EXAM: CT HIP RIGHT W/O CONTRAST  LOCATION: [facility]  DATE: 7/31/2025    INDICATION: Fall with right hip pain. X-ray negative.  COMPARISON: Same-day radiograph.  TECHNIQUE: Noncontrast. Axial, sagittal and coronal thin-section reconstruction. Dose reduction techniques were used.     FINDINGS:   Small field-of-view right hip images, as well as axial large field-of-view images extending from the midportion of the right distally to the proximal thigh are available for interpretation. The sacrum is not assessed.    Bones are demineralized. Acute mildly displaced fracture right inferior pubic ramus. Nondisplaced fracture right superior pubic root at the puboacetabular junction.    Right hip joint space is maintained. There is no dislocation.    Soft tissue contusion lateral subcutaneous tissues of the right hip.    Scattered atherosclerotic vascular calcifications. Sigmoid colon diverticulosis.      Impression    IMPRESSION:  1.  Acute mildly displaced right inferior pubic ramus fracture.  2.  Acute nondisplaced fracture right superior pubic root at the puboacetabular junction.  3.  Bone demineralization.      NOTE: ABNORMAL REPORT    THE DICTATION ABOVE DESCRIBES AN ABNORMALITY FOR WHICH FOLLOW-UP IS NEEDED.      EKG 12-lead, tracing only   Result Value Ref Range    Systolic Blood Pressure  mmHg    Diastolic Blood Pressure  mmHg    Ventricular Rate 140 BPM    Atrial Rate 140 BPM    CO Interval 124 ms    QRS Duration 74 ms     ms    QTc 485 ms     P Axis 100 degrees    R AXIS -18 degrees    T Axis 221 degrees    Interpretation ECG       Sinus tachycardia  ST & T wave abnormality, consider inferior ischemia  ST & T wave abnormality, consider anterolateral ischemia  Abnormal ECG  When compared with ECG of 26-Jan-2009 07:40,  MANUAL COMPARISON REQUIRED PREVIOUS ECG IS INCOMPATIBLE     Troponin T, High Sensitivity   Result Value Ref Range    Troponin T, High Sensitivity 8 <=14 ng/L       For the majority of the shift this patient's behavior was Green     Cardiac Rhythm: Tachycardia  Pt needs tele? Yes  Skin/wound Issues: none noted in limited focused ER assessments.     Code Status: DNR / DNI    Pain control: good    Nausea control: pt had none    Abnormal labs/tests/findings requiring intervention: See CT, hip fx.     Patient tested for COVID 19 prior to admission: NO     OBS brochure/video discussed/provided to patient/family: Yes     Family present during ED course? Yes     Family Comments/Social Situation comments: None.    Tasks needing completion: None    Fara Godfrey RN

## 2025-07-31 NOTE — H&P
"[facility]    History and Physical - Hospitalist Service       Date of Admission:  7/31/2025    Assessment & Plan      Gail Cuba is a 85 year old female admitted on 7/31/2025 with a past medical history significant for atrial fibrillation on anticoagulation, history of stroke, presumed Parkinson's disease, and anxiety / depression. She experienced a fall at home this morning around 10:30 am and was found to have pubic rami fractures.     Closed fracture of multiple rami of right pubis, initial encounter   Patient presented to the ED on 7/31/2025 after taking a fall onto the right hip at home (see below)  Pelvic XR: \"w/unilateral right hip showed no fracture, normal joint spaces and alignment, osteopenia.\"  CT right hip: \"w/o contrast revealed the following: Acute mildly displaced right inferior pubic ramus fracture, acute nondisplaced fracture right superior pubic root at the puboacetabular junction, bone demineralization\"  Patient lives alone and is unable to safely ambulate due to pain.   Case discussed between emergency department and on-call ortho.  - Ortho consulted and following, no anticipated surgical intervention  - Bedrest with bathroom privileges with assist until PT evaluation and ortho weight bearing recommendations given   - Analgesia: scheduled acetaminophen, prn oxycodone  - PT/OT  - Care Management, may need placement    Fall   Head trauma, on anticoagulation  Lost balance and fell on 7/31, head trauma but no loss of consciousness. No associated dizziness / lightheadedness. Is on Xarelto 20 mg daily, last dose 7/30. Endorses new onset headache and some neck pain, as well as pain with ambulation (see above).   CT head and cervical spine w/o contrast ordered in ED were unremarkable for any acute intracranial processes, fractures, or post traumatic subluxation.   No focal deficits on admission exam, cranial nerves WNL.   - Repeat CT scan not indicated at this time  - " "Neuro checks q4h, obtain repeat head CT if new focal neuro findings  - Holding Xarelto, reevaluate neuro status in am and resume as appropriate    Rapid atrial fibrillation  Previously diagnosed. Rate controlled prior to admission with metoprolol tartrate 12.5 mg bid. Anticoagulated prior to admission with Xarelto 20 mg q pm. Missed morning metoprolol dose 7/31/2025 and had a heart rate in the 120-140's in the emergency department that improved with IV metoprolol and resumption of oral metoprolol. Patient asymptomatic despite rapid rate.   - Monitor on telemetry   - Continue home metoprolol   - See above regarding Xarelto    Possible parkinson's  PCP noted shuffling gate with cogwheel rigidity on 5/2024. She received a clinical diagnosis of Parkinsonism, but did not complete a full work-up with neurology (transportation issues). Was started on empiric carbidopa-levodopa which patient says helps to control her symptoms.   - Continue home carbidopa-levodopa    History of stroke  Cerebrovascular accident (CVA) due to embolism of right middle cerebral artery   Hyperlipidemia LDL goal <130  Presented to the ED on 8/12/2024 with weakness and pain after a fall she suffered 3 weeks prior. She also reported that she hit her head for which a head CT was obtained and incidentally showed a subacute right corpus striatum infarct which was then confirmed on MRI. She was not on anticoagulation for her atrial fibrillation prior to this infarct. Is on DOAC as noted above. No new stroke symptoms at time of current admission.   - See above regarding Xarelto    Recurrent major depressive disorder, in partial remission  Anxiety  Managed prior to admission with Escitalopram 20 mg daily for depression and anxiety. Patient states \"I feel like I need a bump up in my medication\" for her anxiety, admits to feeling more anxious lately but is calm at time of admission.  - Continue home Escitalopram     Personal history of malignant neoplasm of " "breast  Right breast cancer treated with localized lumpectomy and radiation in 2009.  Patient states she has \"not had any issues in a long time.\"  - Continue with outpatient surveillance            Observation Goals: -diagnostic tests and consults completed and resulted, -vital signs normal or at patient baseline, -adequate pain control on oral analgesics, -safe disposition plan has been identified, Nurse to notify provider when observation goals have been met and patient is ready for discharge.  Diet: Regular Diet Adult  DVT Prophylaxis: DOAC (currently on hold)  Pratt Catheter: Not present  Lines: None     Cardiac Monitoring: ACTIVE order. Indication: Tachyarrhythmias, acute (48 hours)  Code Status: No CPR- Do NOT Intubate - discussed at time of admission     Clinically Significant Risk Factors Present on Admission                # Drug Induced Coagulation Defect: home medication list includes an anticoagulant medication                  # Financial/Environmental Concerns:           Disposition Plan     Medically Ready for Discharge: Anticipated Tomorrow         The patient's care was discussed with the Attending Physician, Dr. Wilner Perez and Patient.    Marybeth Acharya PA-C  Hospitalist Service  [facility]  Securely message with [employer] (more info)  Text page via [facility] Paging/Directory     ______________________________________________________________________    Chief Complaint   \"I was reaching for something in the closet and lost my balance and fell.\"    History is obtained from the patient, review of EMR, and emergency department documentation.     History of Present Illness   Gail Cuba is a 85 year old female with a past medical history significant for atrial fibrillation on anticoagulation, history of stroke, presumed Parkinson's disease, and anxiety / depression. She experienced a fall at home this morning around 10:30 am and was found to have pubic rami fractures. " "    Patient lives alone in an apartment.  This morning around 10:30 am she was reaching for something in her closet and lost her balance, fell onto the linoleum floor and landed on her right side.  She thinks she hit the right side of her head and her right hip.  She was unable to get up on her own due to pain, but was able to \"scoot\" over to her phone and call her neighbor.  Her neighbor and another friend came over and helped her up into a chair.   She tried to bear weight to use the bathroom but was unable.   She wanted to stay at home and try to tough it out, but her friend eventually convinced her to go in to get evaluated.   She was brought to the emergency department where work-up revealed right pubic rami fractures which limit her ability to safely ambulate and therefore she is unable to return home at this time.      She denies feeling dizzy or lightheaded prior to her fall, says she simply lost her balance.  She did hit her head but did not lose consciousness.   Her right neck hurts and she has a headache after her fall, but no other new areas of pain since her fall.   She does not have any pain in her pelvis when laying still, but does have pain when she moves or tries to bear weight.      She was found to have tachycardia upon arrival to the emergency department, but she denies any associated chest pain, palpitations, racing heart, or other cardiac symptoms.  She frequently feels short of breath, but this is not uncommon or new, and she attributes it to anxiety.  She is on metoprolol at home but did not have her morning dose today after her fall.      No recent illness, appetite \"so-so, I've never been a big eater,\" but no sudden changes. Admits she does not drink enough water to stay hydrated, but does drink coffee and milk, sometimes tea or juice.  Weight fairly stable lately.  Energy is low, but \"I force myself to do things,\" is able to keep up with her house work, although does not do much cooking " "anymore.   Has a runny nose sometimes.  On Lexapro for anxiety / depression, \"I think I need another bump on that, I've been anxious lately.\"  Vision is \"always bad,\" but no acute changes recently.   Has constipation chronically, no recent changes to bowel habits.      No cough, wheeze, abdominal pain, dysuria, nausea, vomiting, diarrhea, melena, hematochezia.      Past Medical History    Past Medical History:   Diagnosis Date    Anxiety     Breast cancer (H) 2009    Right breast cancer    Breast mass     Chest pain     Hypertension     MEDICAL HISTORY OF - 6-08    Rt breast cancer treated with radiation    Urge incontinence        Past Surgical History   Past Surgical History:   Procedure Laterality Date    BIOPSY BREAST      BREAST LUMPECTOMY, RT/LT  1-26-09    Breast Lumpectomy RT    COLONOSCOPY  7/25/2013    Procedure: COLONOSCOPY;  Colonoscopy  ;  Surgeon: Deep Purcell MD;  Location: [facility]    D & C  5/24/10    for EMB     DILATION AND CURETTAGE, OPERATIVE HYSTEROSCOPY, COMBINED  1/14/2014    Procedure: COMBINED DILATION AND CURETTAGE, OPERATIVE HYSTEROSCOPY;  Hysteroscopy with dilation and curettage, possible polypectomy- choice anes  ;  Surgeon: Lauryn Hoffman MD;  Location: [facility]    HC EXCISION BREAST LESION, OPEN >=1  6-13-08    with sentinal node biopsy    LUMPECTOMY BREAST  2009    right breast    PHACOEMULSIFICATION WITH STANDARD INTRAOCULAR LENS IMPLANT Left 2/12/2020    Procedure: Cataract Removal with Implant;  Surgeon: Jose Cerrato MD;  Location: [facility]    PHACOEMULSIFICATION WITH STANDARD INTRAOCULAR LENS IMPLANT Right 3/4/2020    Procedure: Cataract Removal with Implant;  Surgeon: Jose Cerrato MD;  Location: [facility]    SURGICAL HISTORY OF -       Neck surgery.    SURGICAL HISTORY OF -   11/17/09    L4-5 back surgery       Prior to Admission Medications   Prior to Admission Medications   Prescriptions Last Dose Informant Patient Reported? Taking? "   acetaminophen (TYLENOL) 500 MG tablet  Self Yes No   Sig: Take 500 mg by mouth nightly as needed for pain   carbidopa-levodopa (SINEMET)  MG tablet 7/30/2025  No Yes   Sig: Take 1 tablet by mouth twice daily   escitalopram (LEXAPRO) 20 MG tablet 7/30/2025  No Yes   Sig: Take 1 tablet (20 mg) by mouth daily.   metoprolol tartrate (LOPRESSOR) 25 MG tablet 7/30/2025  No Yes   Sig: Take 0.5 tablets (12.5 mg) by mouth 2 times daily.   rivaroxaban ANTICOAGULANT (XARELTO) 20 MG TABS tablet 7/30/2025  No Yes   Sig: Take 1 tablet (20 mg) by mouth daily (with dinner).      Facility-Administered Medications: None        Review of Systems    The 10 point Review of Systems is negative other than noted in the HPI or here.     Social History   I have reviewed this patient's social history and updated it with pertinent information if needed.  Social History     Tobacco Use    Smoking status: Never    Smokeless tobacco: Never   Vaping Use    Vaping status: Never Used   Substance Use Topics    Alcohol use: Yes     Comment: Occasional    Drug use: No         Family History   I have reviewed this patient's family history and updated it with pertinent information if needed.  Family History   Problem Relation Age of Onset    Alcohol/Drug Mother         alcohalic    Hypertension Mother     Cerebrovascular Disease Mother     Thyroid Disease Sister     Hyperlipidemia Sister     Depression Sister     Anxiety Disorder Sister     Osteoporosis Sister     Musculoskeletal Disorder Son     Hypertension Son     Diabetes Father     Prostate Cancer Father     Cancer - colorectal Maternal Grandfather     Diabetes Brother     Depression Brother     Asthma No family hx of     C.A.D. No family hx of     Breast Cancer No family hx of     Anesthesia Reaction No family hx of     Blood Disease No family hx of     Ovarian Cancer No family hx of         Physical Exam   Vital Signs: Temp: 97.5  F (36.4  C) Temp src: Oral BP: (!) 144/105 Pulse: (!) 131    Resp: 20 SpO2: 96 %      Weight: 113 lbs 1.54 oz    Constitutional: Alert, oriented, cooperative. No apparent distress, appears nontoxic. Speaking in full coherent sentences.      Eyes: Eyes are clear, pupils are reactive. No scleral icterus. Extraocular movements intact.     HEENT: Oropharynx is clear and moist, no lesions. Normocephalic, no evidence of cranial trauma.       Cardiovascular: Irregularly irregular rhythm with variable rate, normal S1 and S2. No murmur, rubs, or gallops. Peripheral pulses intact bilaterally. No lower extremity edema.     Respiratory: Non-labored breathing on room air. Lung sounds are clear to auscultation bilaterally without wheezes, rhonchi, or crackles.     GI: Soft, non-distended. Non-tender, no rebound or guarding. No hepatosplenomegaly or masses appreciated. Normal bowel sounds.      Musculoskeletal: Without obvious deformity. Normal muscle bulk and tone. Distal CMS intact.       Skin: Warm and dry, no rashes or ecchymoses. No mottling of skin.      Neurologic: Patient moves all extremities. Cranial nerves are grossly intact. Gross strength and sensation are equal bilaterally.     Genitourinary: PureWick in place draining royal colored urine.     Medical Decision Making       80 MINUTES SPENT BY ME on the date of service doing chart review, history, exam, documentation & further activities per the note.  MANAGEMENT DISCUSSED with the following over the past 24 hours: Dr. Wilner Perez   NOTE(S)/MEDICAL RECORDS REVIEWED over the past 24 hours: neurology note 10/8/24 and discharge summary 8/13/24, emergency department notes 7/31/25.  Tests ORDERED & REVIEWED in the past 24 hours:  - BMP  - CBC  - Troponin      Data     I have personally reviewed the following data over the past 24 hrs:    9.6  \   13.1   / 217     137 103 13.2 /  96   5.0 22 0.72 \     Trop: 8 BNP: N/A       Imaging results reviewed over the past 24 hrs:   Recent Results (from the past 24 hours)   Head CT w/o  contrast    Narrative    EXAM: CT HEAD W/O CONTRAST, CT CERVICAL SPINE W/O CONTRAST  LOCATION: [facility]  DATE: 7/31/2025    INDICATION: fall with headache.  on xarelto  COMPARISON: August 12, 2024.  TECHNIQUE:   1) Routine CT Head without IV contrast. Multiplanar reformats. Dose reduction techniques were used.  2) Routine CT Cervical Spine without IV contrast. Multiplanar reformats. Dose reduction techniques were used.    FINDINGS:   HEAD CT:   INTRACRANIAL CONTENTS: No intracranial hemorrhage, extraaxial collection, or mass effect.  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 Remote right basal ganglia lacunar infarct. Mild presumed chronic   small vessel ischemic changes. Mild to moderate generalized volume loss. No hydrocephalus.     VISUALIZED ORBITS/SINUSES/MASTOIDS: No intraorbital abnormality. No paranasal sinus mucosal disease. No middle ear or mastoid effusion.    BONES/SOFT TISSUES: No acute abnormality.    CERVICAL SPINE CT:   VERTEBRA: Grade 1 anterolisthesis C3 on C4 and C4 on C5 measuring approximately 2 mm. Trace retrolisthesis C5 on C6. No fracture or posttraumatic subluxation.     CANAL/FORAMINA: No severe spinal canal stenosis.. Foraminal stenosis is severe on the left at C6-C7.    PARASPINAL: No extraspinal abnormality. Visualized lung fields are clear.      Impression    IMPRESSION:  HEAD CT:  1.  No acute intracranial process.  2.  Chronic changes as above.    CERVICAL SPINE CT:  1.  No CT evidence for acute fracture or post traumatic subluxation.     CT Cervical Spine w/o Contrast    Narrative    EXAM: CT HEAD W/O CONTRAST, CT CERVICAL SPINE W/O CONTRAST  LOCATION: [facility]  DATE: 7/31/2025    INDICATION: fall with headache.  on xarelto  COMPARISON: August 12, 2024.  TECHNIQUE:   1) Routine CT Head without IV contrast. Multiplanar reformats. Dose reduction techniques were used.  2)  Routine CT Cervical Spine without IV contrast. Multiplanar reformats. Dose reduction techniques were used.    FINDINGS:   HEAD CT:   INTRACRANIAL CONTENTS: No intracranial hemorrhage, extraaxial collection, or mass effect.  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 Remote right basal ganglia lacunar infarct. Mild presumed chronic   small vessel ischemic changes. Mild to moderate generalized volume loss. No hydrocephalus.     VISUALIZED ORBITS/SINUSES/MASTOIDS: No intraorbital abnormality. No paranasal sinus mucosal disease. No middle ear or mastoid effusion.    BONES/SOFT TISSUES: No acute abnormality.    CERVICAL SPINE CT:   VERTEBRA: Grade 1 anterolisthesis C3 on C4 and C4 on C5 measuring approximately 2 mm. Trace retrolisthesis C5 on C6. No fracture or posttraumatic subluxation.     CANAL/FORAMINA: No severe spinal canal stenosis.. Foraminal stenosis is severe on the left at C6-C7.    PARASPINAL: No extraspinal abnormality. Visualized lung fields are clear.      Impression    IMPRESSION:  HEAD CT:  1.  No acute intracranial process.  2.  Chronic changes as above.    CERVICAL SPINE CT:  1.  No CT evidence for acute fracture or post traumatic subluxation.     Pelvis XR w/ unilateral hip right    Narrative    EXAM: XR PELVIS AND HIP RIGHT 1 VIEW  LOCATION: [facility]  DATE: 7/31/2025    INDICATION: fall with pain  COMPARISON: None.      Impression    IMPRESSION: Normal joint spaces and alignment. No fracture. Osteopenia.   CT Hip Right w/o Contrast    Narrative    EXAM: CT HIP RIGHT W/O CONTRAST  LOCATION: [facility]  DATE: 7/31/2025    INDICATION: Fall with right hip pain. X-ray negative.  COMPARISON: Same-day radiograph.  TECHNIQUE: Noncontrast. Axial, sagittal and coronal thin-section reconstruction. Dose reduction techniques were used.     FINDINGS:   Small field-of-view right hip images, as well as axial  large field-of-view images extending from the midportion of the right distally to the proximal thigh are available for interpretation. The sacrum is not assessed.    Bones are demineralized. Acute mildly displaced fracture right inferior pubic ramus. Nondisplaced fracture right superior pubic root at the puboacetabular junction.    Right hip joint space is maintained. There is no dislocation.    Soft tissue contusion lateral subcutaneous tissues of the right hip.    Scattered atherosclerotic vascular calcifications. Sigmoid colon diverticulosis.      Impression    IMPRESSION:  1.  Acute mildly displaced right inferior pubic ramus fracture.  2.  Acute nondisplaced fracture right superior pubic root at the puboacetabular junction.  3.  Bone demineralization.      NOTE: ABNORMAL REPORT    THE DICTATION ABOVE DESCRIBES AN ABNORMALITY FOR WHICH FOLLOW-UP IS NEEDED.

## 2025-08-01 PROBLEM — I48.92 ATRIAL FLUTTER WITH RAPID VENTRICULAR RESPONSE (H): Status: ACTIVE | Noted: 2025-08-01

## 2025-08-01 PROBLEM — I48.91 RAPID ATRIAL FIBRILLATION (H): Status: RESOLVED | Noted: 2024-08-12 | Resolved: 2025-08-01

## 2025-08-01 PROBLEM — D62 ACUTE BLOOD LOSS ANEMIA: Status: ACTIVE | Noted: 2025-08-01

## 2025-08-01 ASSESSMENT — ACTIVITIES OF DAILY LIVING (ADL)
ADLS_ACUITY_SCORE: 40
ADLS_ACUITY_SCORE: 41
ADLS_ACUITY_SCORE: 42
ADLS_ACUITY_SCORE: 42
ADLS_ACUITY_SCORE: 40
ADLS_ACUITY_SCORE: 42
ADLS_ACUITY_SCORE: 41
ADLS_ACUITY_SCORE: 42
DEPENDENT_IADLS:: INDEPENDENT
ADLS_ACUITY_SCORE: 42
ADLS_ACUITY_SCORE: 42
ADLS_ACUITY_SCORE: 41
ADLS_ACUITY_SCORE: 42
ADLS_ACUITY_SCORE: 41
ADLS_ACUITY_SCORE: 42
ADLS_ACUITY_SCORE: 40
ADLS_ACUITY_SCORE: 42
ADLS_ACUITY_SCORE: 41
ADLS_ACUITY_SCORE: 42
PREVIOUS_RESPONSIBILITIES: MEAL PREP;HOUSEKEEPING;LAUNDRY;MEDICATION MANAGEMENT;FINANCES
ADLS_ACUITY_SCORE: 42
ADLS_ACUITY_SCORE: 38
ADLS_ACUITY_SCORE: 42

## 2025-08-01 NOTE — PROGRESS NOTES
08/01/25 1036   Appointment Info   Signing Clinician's Name / Credentials (PT) Flavia Verdin, PT   Quick Adds   Quick Adds Certification   Living Environment   People in Home alone   Current Living Arrangements independent living facility   Home Accessibility no concerns   Living Environment Comments no stairs   Self-Care   Equipment Currently Used at Home walker, rolling;grab bar, tub/shower;shower chair;raised toilet seat   Fall history within last six months yes   Number of times patient has fallen within last six months 3   Activity/Exercise/Self-Care Comment indep with mobility with 4WW, ADL's. groceries delivered. friends live nearby.   General Information   Onset of Illness/Injury or Date of Surgery 07/31/25   Referring Physician Marybeth Acharya PA-C   Patient/Family Therapy Goals Statement (PT) agreeable to TCU   Pertinent History of Current Problem (include personal factors and/or comorbidities that impact the POC) Gail Cuba is a 85 year old female admitted on 7/31/2025 with a past medical history significant for atrial fibrillation on anticoagulation, history of stroke, presumed Parkinson's disease, and anxiety / depression. She experienced a fall at home this morning around 10:30 am and was found to have pubic rami fractures, evaluated by ortho, OK for WBAT with walker, no surgical intervention needed.   Existing Precautions/Restrictions fall   Weight-Bearing Status - RLE weight-bearing as tolerated   Cognition   Follows Commands (Cognition) WFL   Pain Assessment   Patient Currently in Pain Yes, see Vital Sign flowsheet  (R groin area, increases with activity)   Range of Motion (ROM)   Range of Motion ROM deficits secondary to pain   Strength (Manual Muscle Testing)   Strength (Manual Muscle Testing) Deficits observed during functional mobility   Strength Comments needs assist for mobility, not formally assessed due to pain   Bed Mobility   Comment, (Bed Mobility) supine>sit with Michael    Transfers   Comment, (Transfers) sit>stand from EOB with modA and 2WW, leans backwards in standing   Gait/Stairs (Locomotion)   Comment, (Gait/Stairs) amb few steps to chair with 2WW and Michael, limited in distance due to pain   Balance   Balance Comments hx of falls, leans backwards in standing   Clinical Impression   Criteria for Skilled Therapeutic Intervention Yes, treatment indicated   PT Diagnosis (PT) impaired functional mobility   Influenced by the following impairments pain, LE weakness, impaired balance   Functional limitations due to impairments impaired bed mobility, transfers, gait   Clinical Presentation (PT Evaluation Complexity) stable   Clinical Presentation Rationale clinical reasoning   Clinical Decision Making (Complexity) low complexity   Planned Therapy Interventions (PT) balance training;bed mobility training;cryotherapy;gait training;home exercise program;patient/family education;strengthening;transfer training;progressive activity/exercise;risk factor education;home program guidelines   Risk & Benefits of therapy have been explained evaluation/treatment results reviewed;care plan/treatment goals reviewed;risks/benefits reviewed;current/potential barriers reviewed;participants voiced agreement with care plan;participants included;patient   PT Total Evaluation Time   PT Eval, Low Complexity Minutes (64778) 14   Therapy Certification   Start of care date 08/01/25   Certification date from 08/01/25   Certification date to 08/08/25   Medical Diagnosis R pubi rami fractures   Physical Therapy Goals   PT Frequency 5x/week   PT Predicted Duration/Target Date for Goal Attainment 08/08/25   PT Goals Bed Mobility;Transfers;Gait   PT: Bed Mobility Supervision/stand-by assist;Supine to/from sit   PT: Transfers Supervision/stand-by assist;Bed to/from chair;Assistive device   PT: Gait Supervision/stand-by assist;Standard walker;50 feet   Interventions   Interventions Quick Adds Therapeutic Activity    Therapeutic Activity   Therapeutic Activities: dynamic activities to improve functional performance Minutes (90732) 10   Symptoms Noted During/After Treatment Increased pain   Treatment Detail/Skilled Intervention edu on role of IP PT to address mobility and assist in safe discharge plan. cues for sequencing of bed mobility to reduce pain, pain with moving RLE over side of bed. cues for hand placement with sit>stand, leans backwards in standing initially which pt states is her baseline, improves with cues. cues for sequencing of taking steps to chair, limited by pain, fair balance. discussed rec for TCU given weakness and pain, pt in agreement as needs to be indep in order to return home. remains in chair, call light in reach.   PT Discharge Planning   PT Plan Fri 1/5; WBAT RLE, transfers/gait with 2WW pending pain, LE strengthening   PT Discharge Recommendation (DC Rec) Transitional Care Facility   PT Rationale for DC Rec rec TCU as pt requires Ax1 for short distance mobility to chair, limited by pain from pelvic fracture, unsafe to return home as pt lives alone and needs to be indep   PT Brief overview of current status supine>sit with Michael, sit>stand with Michael, amb few steps to chair with 2WW and CGA   PT Total Distance Amb During Session (feet) 3   PT Equipment Needed at Discharge   (has 4WW)   Physical Therapy Time and Intention   Timed Code Treatment Minutes 10   Total Session Time (sum of timed and untimed services) 24   [facility]                                                                                   OUTPATIENT PHYSICAL THERAPY    PLAN OF TREATMENT FOR OUTPATIENT REHABILITATION   Patient's Last Name, First Name, Gail Rueda YOB: 1940   Provider's Name   [facility]   Medical Record No.  [id number]     Onset Date: 07/31/25 Start of Care Date: 08/01/25     Medical Diagnosis:  R pubi rami fractures                PT Diagnosis:  impaired functional mobility Certification Dates:  From: 08/01/25  To: 08/08/25       See note for plan of treatment, functional goals, and certification details.    I CERTIFY THE NEED FOR THESE SERVICES FURNISHED UNDER        THIS PLAN OF TREATMENT AND WHILE UNDER MY CARE (Physician co-signature of this document indicates review and certification of the therapy plan).

## 2025-08-01 NOTE — PLAN OF CARE
Goal Outcome Evaluation:    Pain Control: Patients pain will be well controlled per the MAR, Provider will be updated of any changes.     Labs: Patient will have AM labs and as needed.    PT/OT: Patient will have PT/OT and assess for any assistive devices.    Telemetry: Patient has on Tele and will be monitored by the MS Charge, ICU, Kaitlynn and will notify of any new changes.

## 2025-08-01 NOTE — PROGRESS NOTES
08/01/25 0800   Appointment Info   Signing Clinician's Name / Credentials (OT) Radha Walker, OTR/L   Quick Adds   Quick Adds Certification   Living Environment   People in Home alone   Current Living Arrangements apartment  (Senior apartment)   Home Accessibility no concerns   Transportation Anticipated family or friend will provide   Living Environment Comments Pt lives on main floor, no stairs. Sister lives across the [place], friend lives a couple doors down.   Self-Care   Usual Activity Tolerance fair   Current Activity Tolerance poor   Regular Exercise No   Equipment Currently Used at Home walker, rolling;grab bar, tub/shower;shower chair;raised toilet seat   Fall history within last six months yes   Number of times patient has fallen within last six months 3   Activity/Exercise/Self-Care Comment Pt independent in ADLs at baseline.   Instrumental Activities of Daily Living (IADL)   Previous Responsibilities meal prep;housekeeping;laundry;medication management;finances   IADL Comments Pt independent in IADLs at baseline   General Information   Onset of Illness/Injury or Date of Surgery 07/31/25   Referring Physician Dr. Marybeth Acharya   Patient/Family Therapy Goal Statement (OT) Return home   Additional Occupational Profile Info/Pertinent History of Current Problem From H&P: Gail Cuba is a 85 year old female admitted on 7/31/2025 with a past medical history significant for atrial fibrillation on anticoagulation, history of stroke, presumed Parkinson's disease, and anxiety / depression. She experienced a fall at home this morning around 10:30 am and was found to have pubic rami fractures.   Existing Precautions/Restrictions fall   Cognitive Status Examination   Orientation Status orientation to person, place and time   Follows Commands follows one-step commands;verbal cues/prompting required   Visual Perception   Visual Impairment/Limitations WFL   Sensory   Sensory Quick Adds sensation intact   Pain  Assessment   Patient Currently in Pain No   Posture   Posture other (see comments)   Posture Comments Pt posteriorly leans and requires VC to adjust/activate core when seated and standing to sustain static balance.   Range of Motion Comprehensive   General Range of Motion no range of motion deficits identified   Strength Comprehensive (MMT)   Comment, General Manual Muscle Testing (MMT) Assessment Ax tolerance reduced   Muscle Tone Assessment   Muscle Tone Quick Adds No deficits were identified   Coordination   Functional Limitations Decreased speed   Bed Mobility   Bed Mobility supine-sit;sit-supine   Supine-Sit [place] (Bed Mobility) verbal cues   Sit-Supine [place] (Bed Mobility) minimum assist (75% patient effort);verbal cues   Assistive Device (Bed Mobility) bed rails   Transfers   Transfers sit-stand transfer   Sit-Stand Transfer   Sit-Stand [place] (Transfers) moderate assist (50% patient effort);verbal cues   Assistive Device (Sit-Stand Transfers) walker, standard   Balance   Balance Assessment sitting static balance;sit to stand dynamic balance;standing static balance   Sitting Balance: Static verbal cues;minimal assist   Position, Sitting Balance unsupported;sitting edge of bed   Sit-to-Stand Balance verbal cues;moderate assist   Standing Balance: Static moderate assist;verbal cues   Position/Device Used, Standing Balance walker, standard   Balance Comments Reduced core strength impairing ability to sustain seated and standing static balance   Activities of Daily Living   BADL Assessment/Intervention lower body dressing;toileting;grooming   Lower Body Dressing Assessment/Training   [place] Level (Lower Body Dressing) don;socks;minimum assist (75% patient effort)   Position (Lower Body Dressing) supine   Grooming Assessment/Training   [place] Level (Grooming) unable to assess   Toileting   [place] Level (Toileting) dependent (less than 25% patient effort);verbal cues    Assistive Devices (Toileting) commode chair   Position (Toileting) supported standing   Comment, (Toileting) Attempted 2 toilet transfers from EOB to commode chair and pt was unable to pivot or perform dynamic standing d/t pain level in R hip. Pt expressed significant fear to ambulate.   Clinical Impression   Criteria for Skilled Therapeutic Interventions Met (OT) Yes, treatment indicated   OT Diagnosis Reduced ax tolerance   Influenced by the following impairments Pain, fear, imbalance   OT Problem List-Impairments impacting ADL problems related to;activity tolerance impaired;balance;pain   ADL comments/analysis Mod A transfers, dependent w/ toileting, min A LB dressing   Assessment of Occupational Performance 3-5 Performance Deficits   Identified Performance Deficits dressing, toileting, standing g/h   Planned Therapy Interventions (OT) ADL retraining;progressive activity/exercise   Intervention Comments OT educated pt on IP OT role. Pt agreed to POC, evaluation completed and treatment initiated.   Clinical Decision Making Complexity (OT) problem focused assessment/low complexity   Risk & Benefits of therapy have been explained evaluation/treatment results reviewed;care plan/treatment goals reviewed;risks/benefits reviewed;current/potential barriers reviewed;participants voiced agreement with care plan;participants included;patient   Clinical Impression Comments Pt w/ decreased ax tolerance resulting in reduced independence w/ ADLs. Pt would benefit from skilled OT to progress ax tolerance & increase independence w/ ADL for safe return home.   OT Total Evaluation Time   OT Eval Low Complexity Minutes (87533) 20   Therapy Certification   Start of Care Date 08/01/25   Certification date from 08/01/25   Certification date to 08/08/25   Medical Diagnosis Closed fracture of multiple rami of right pubis, initial encounter   OT Goals   Therapy Frequency (OT) 5 times/week   OT Predicted Duration/Target Date for Goal  Attainment 08/08/25   OT Goals Hygiene/Grooming;Toilet Transfer/Toileting;Lower Body Dressing   OT: Hygiene/Grooming while standing;supervision/stand-by assist   OT: Lower Body Dressing Supervision/stand-by assist   OT: Toilet Transfer/Toileting Supervision/stand-by assist;toilet transfer   Interventions   Interventions Quick Adds Therapeutic Activity   Therapeutic Activities   Therapeutic Activity Minutes (76470) 10   Symptoms noted during/after treatment increased pain   Treatment Detail/Skilled Intervention OT facilitated progressing pt's ax tolerance through performing bed mobility and attempting pivot transfers x2 to bedside commode chair; pt performed supine-to-sit tx with VC. Pt required min A to sustain seated static balance, as she posteriorly leans. Pt benefitted from holding onto commode armrail to sustain static seated balance. Pt performed sit-to-stand tx's x2 with mod A and VC to shift weight forwards. Pt expressed significant pain in R hip when sustaining supported static standing, as well as fear to ambulate/pivot. Despite encouragement, pt performed stand-to-sit tx with min A and pt was unable to transfer to commode chair d/t pain/limited ax tolerance. Pt required min A to transfer legs for bed mobility from sit-to-supine. Pt left in bed with needs in reach.   OT Discharge Planning   OT Plan POC Fri 1/5; advance ax tolerance for OOB ax, toilet tx, standing g/h,  LB dressing   OT Discharge Recommendation (DC Rec) Transitional Care Facility;home with home care occupational therapy   OT Rationale for DC Rec Pt lives alone, high fall risk, below baseline. Pt would benefit from skilled OT to develop increased ax tolerance and independence in ADLs for safe return home.;   OT Brief overview of current status Mod A transfers, dependent w/ toileting, min A LB dressing   OT Total Distance Amb During Session (feet) 0   Total Session Time   Timed Code Treatment Minutes 10   Total Session Time (sum of timed and  untimed services) 30   Psychosocial Support   Trust Relationship/Rapport care explained;choices provided;questions encouraged;thoughts/feelings acknowledged   [facility]                                                                                   OUTPATIENT OCCUPATIONAL THERAPY    PLAN OF TREATMENT FOR OUTPATIENT REHABILITATION   Patient's Last Name, First Name, LUIZMaryaGLORIAMarya  Gail Cuba YOB: 1940   Provider's Name   [facility]   Medical Record No.  [id number]     Onset Date: 07/31/25 Start of Care Date: 08/01/25     Medical Diagnosis:  Closed fracture of multiple rami of right pubis, initial encounter               OT Diagnosis:  Reduced ax tolerance Certification Dates:  From: 08/01/25  To: 08/08/25     See note for plan of treatment, functional goals, and certification details.    I CERTIFY THE NEED FOR THESE SERVICES FURNISHED UNDER        THIS PLAN OF TREATMENT AND WHILE UNDER MY CARE (Physician co-signature of this document indicates review and certification of the therapy plan).

## 2025-08-01 NOTE — CONSULTS
Care Management Initial Consult    General Information  Assessment completed with: Patient,    Type of CM/SW Visit: Initial Assessment    Primary Care Provider verified and updated as needed: Yes   Readmission within the last 30 days: no previous admission in last 30 days      Reason for Consult: discharge planning  Advance Care Planning: Advance Care Planning Reviewed: no concerns identified, verified with patient          Communication Assessment  Patient's communication style: spoken language (English or Bilingual)    Hearing Difficulty or Deaf: no   Wear Glasses or Blind: other (see comments)    Cognitive  Cognitive/Neuro/Behavioral: WDL                      Living Environment:   People in home: alone     Current living Arrangements: apartment      Able to return to prior arrangements: other (see comments) (Pt is considering TCU. Pt does plan to return home at some point. Pt is agreeable to home care services.)       Family/Social Support:  Care provided by: self  Provides care for: no one  Marital Status: Single  Support system: Children, Sibling(s), Friend    Sister lives across the street and sons are nearby and available.      Description of Support System: Supportive, Involved    Support Assessment: Adequate family and caregiver support, Adequate social supports    Current Resources:   Patient receiving home care services: No        Community Resources: None  Equipment currently used at home: walker, rolling, grab bar, tub/shower, shower chair, raised toilet seat      Employment/Financial:  Employment Status: retired        Financial Concerns: none   Referral to Financial Worker: No       Does the patient's insurance plan have a 3 day qualifying hospital stay waiver?  Yes     Which insurance plan 3 day waiver is available? Alternative insurance waiver    Will the waiver be used for post-acute placement? Yes    Lifestyle & Psychosocial Needs:  Social Drivers of Health     Food Insecurity: Low Risk   (7/31/2025)    Food Insecurity     Within the past 12 months, did you worry that your food would run out before you got money to buy more?: No     Within the past 12 months, did the food you bought just not last and you didn t have money to get more?: No   Depression: At risk (11/14/2024)    PHQ-2     PHQ-2 Score: 6   Housing Stability: Low Risk  (7/31/2025)    Housing Stability     Do you have housing? : Yes     Are you worried about losing your housing?: No   Tobacco Use: Low Risk  (11/14/2024)    Patient History     Smoking Tobacco Use: Never     Smokeless Tobacco Use: Never     Passive Exposure: Not on file   Financial Resource Strain: Low Risk  (7/31/2025)    Financial Resource Strain     Within the past 12 months, have you or your family members you live with been unable to get utilities (heat, electricity) when it was really needed?: No   Alcohol Use: Not on file   Transportation Needs: Low Risk  (7/31/2025)    Transportation Needs     Within the past 12 months, has lack of transportation kept you from medical appointments, getting your medicines, non-medical meetings or appointments, work, or from getting things that you need?: No   Physical Activity: Not on file   Interpersonal Safety: Low Risk  (7/31/2025)    Interpersonal Safety     Do you feel physically and emotionally safe where you currently live?: Yes     Within the past 12 months, have you been hit, slapped, kicked or otherwise physically hurt by someone?: No     Within the past 12 months, have you been humiliated or emotionally abused in other ways by your partner or ex-partner?: No   Stress: Not on file   Social Connections: Not on file   Health Literacy: Not on file       Functional Status:  Prior to admission patient needed assistance:   Dependent ADLs:: Ambulation-walker, Ambulation-cane  Dependent IADLs:: Independent  Assesssment of Functional Status: Not at baseline with mobility, Needs placement in a SNF/TCF for rehabilitation    Mental  Health Status:  Mental Health Status: No Current Concerns       Chemical Dependency Status:  Chemical Dependency Status: No Current Concerns             Values/Beliefs:  Spiritual, Cultural Beliefs, Mu-ism Practices, Values that affect care: no               Discussed  Partnership in Safe Discharge Planning  document with patient/family: Yes    Additional Information:    Met with pt. Completed AIDET and discussed pt's medical disposition. Explained to pt that TCU is short term rehabilitation to regain strength and mobility. Assured pt that this was not long term care nursing home services. Pt wants to return home as soon as possible. Talked about TCU options and coverage. Pt prefers to discharge to [facility] or may be open to another TCU if needed. Writer sent several TCU referrals. [facility] accepted pt. [facility] authorization is pending.    Next Steps:     Care Transitions to follow and confirm discharge arrangements. SonJonah to provide transportation when ready.        Marybel Gamboa   Care Transitions   [facility]  Tele:[phone]

## 2025-08-01 NOTE — CONSULTS
[facility] Consultation    Consultation - [facility]  Level of consult: One-time consult to assist in determining a diagnosis, recommend an appropriate treatment plan and place orders    Gail Cash,  1940, MRN [id number]     Admitting Dx: Anticoagulated [Z79.01]  Rapid atrial fibrillation (H) [I48.91]  Fall, initial encounter [W19.XXXA]  Closed fracture of multiple rami of right pubis, initial encounter (H) [S32.591A]     PCP: Aisha Hull, [phone]     Code status:  No CPR- Do NOT Intubate     Extended Emergency Contact Information  Primary Emergency Contact: KAREN CASH   [place]  Mobile Phone: [phone]  Relation: Son  Secondary Emergency Contact: JOSH CASH   [facility]  Home Phone: [phone]  Mobile Phone: [phone]  Relation: Son     Assessment:  Right superior and inferior pubic rami fractures     Plan:  This patient was discussed with Dr.Robert Copeland, on-call surgeon for [facility] and they are in agreement with the following plan.   Recommend continuing conservative management with pain control, WBAT with walker support.   PT/OT consulted for mobility, ADLs etc.   Follow up in 2-3 weeks with a hip specialist for repeat xrays and ongoing management.   Discussed the above with the pt, who expressed understanding and is agreeable to the plan.  Orthopedically stable. Will sign off. Please consult again if further orthopedic needs arise.    Thank you for including [facility] in the care of Gail Cash. It has been a pleasure participating in her care.      Ash Cordero PA-C  [facility]    Principal Problem:    Closed fracture of multiple rami of right pubis, initial encounter (H)  Active Problems:    Personal history of malignant neoplasm of breast    Hyperlipidemia LDL goal <130    Anxiety    Osteoporosis    Recurrent major depressive disorder, in partial remission    Rapid atrial  fibrillation (H)    History of stroke    Closed fracture of sacrum (H)    Cerebrovascular accident (CVA) due to embolism of right middle cerebral artery (H)    Acute blood loss anemia       Chief Complaint  Right pubic rami fracture     HPI  The patient is seen in orthopedic consultation at the request of Dr.David Dale.  The patient is a 85 year old female with moderate pain of the right  hip. The patient has a past medical history significant for atrial fibrillation on anticoagulation, history of stroke, presumed Parkinson's disease, and anxiety / depression. She ambulates with a walker at baseline (since a fall last year) and notes that her right leg gets shaky and gives out on her sometimes. On 7/31, she fell while reaching for something in her closet and landed on her right side, hitting her right head and hip. She was unable to get up and scooted over to the phone to call a friend, who helped her get up. She was unable to ambulate afterwards and was brought to the [facility] ED. Imaging showed right superior and inferior pubic rami fractures and she was admitted for further management. Currently she reports right buttock and groin pain with motion and weight bearing. She has been able to get up to the commode with assist. She denies numbness or tingling and any other areas of pain.      History is obtained from the patient and electronic health record     Past Medical History  Past Medical History:   Diagnosis Date    Anxiety     Breast cancer (H) 2009    Right breast cancer    Breast mass     Chest pain     Hypertension     MEDICAL HISTORY OF - 6-08    Rt breast cancer treated with radiation    Urge incontinence        Surgical History  Past Surgical History:   Procedure Laterality Date    BIOPSY BREAST      BREAST LUMPECTOMY, RT/LT  1-26-09    Breast Lumpectomy RT    COLONOSCOPY  7/25/2013    Procedure: COLONOSCOPY;  Colonoscopy  ;  Surgeon: Deep Purcell MD;  Location: [facility]    D & C  5/24/10    for  EMB     DILATION AND CURETTAGE, OPERATIVE HYSTEROSCOPY, COMBINED  1/14/2014    Procedure: COMBINED DILATION AND CURETTAGE, OPERATIVE HYSTEROSCOPY;  Hysteroscopy with dilation and curettage, possible polypectomy- choice anes  ;  Surgeon: Lauryn Hoffman MD;  Location: [facility]    HC EXCISION BREAST LESION, OPEN >=1  6-13-08    with sentinal node biopsy    LUMPECTOMY BREAST  2009    right breast    PHACOEMULSIFICATION WITH STANDARD INTRAOCULAR LENS IMPLANT Left 2/12/2020    Procedure: Cataract Removal with Implant;  Surgeon: Jose Cerrato MD;  Location: [facility]    PHACOEMULSIFICATION WITH STANDARD INTRAOCULAR LENS IMPLANT Right 3/4/2020    Procedure: Cataract Removal with Implant;  Surgeon: Jose Cerrato MD;  Location: [facility]    SURGICAL HISTORY OF -       Neck surgery.    SURGICAL HISTORY OF -   11/17/09    L4-5 back surgery        Social History  Social History     Socioeconomic History    Marital status: Single     Spouse name: Not on file    Number of children: 2    Years of education: 12    Highest education level: Not on file   Occupational History     Employer: RETIRED   Tobacco Use    Smoking status: Never    Smokeless tobacco: Never   Vaping Use    Vaping status: Never Used   Substance and Sexual Activity    Alcohol use: Yes     Comment: Occasional    Drug use: No    Sexual activity: Not Currently   Other Topics Concern     Service No    Blood Transfusions No    Caffeine Concern Yes     Comment: 10 cups of coffee daily    Occupational Exposure No    Hobby Hazards No    Sleep Concern Yes     Comment: Insomnia. wakes to use bathroom    Stress Concern No    Weight Concern Yes     Comment: Thinks she's overweight    Special Diet No    Back Care Yes    Exercise Yes    Bike Helmet Yes    Seat Belt Yes    Self-Exams Yes    Parent/sibling w/ CABG, MI or angioplasty before 65F 55M? No   Social History Narrative    Not on file     Social Drivers of Health     Financial Resource  Strain: Low Risk  (7/31/2025)    Financial Resource Strain     Within the past 12 months, have you or your family members you live with been unable to get utilities (heat, electricity) when it was really needed?: No   Food Insecurity: Low Risk  (7/31/2025)    Food Insecurity     Within the past 12 months, did you worry that your food would run out before you got money to buy more?: No     Within the past 12 months, did the food you bought just not last and you didn t have money to get more?: No   Transportation Needs: Low Risk  (7/31/2025)    Transportation Needs     Within the past 12 months, has lack of transportation kept you from medical appointments, getting your medicines, non-medical meetings or appointments, work, or from getting things that you need?: No   Physical Activity: Not on file   Stress: Not on file   Social Connections: Not on file   Interpersonal Safety: Low Risk  (7/31/2025)    Interpersonal Safety     Do you feel physically and emotionally safe where you currently live?: Yes     Within the past 12 months, have you been hit, slapped, kicked or otherwise physically hurt by someone?: No     Within the past 12 months, have you been humiliated or emotionally abused in other ways by your partner or ex-partner?: No   Housing Stability: Low Risk  (7/31/2025)    Housing Stability     Do you have housing? : Yes     Are you worried about losing your housing?: No       Family History  Family History   Problem Relation Age of Onset    Alcohol/Drug Mother         alcohalic    Hypertension Mother     Cerebrovascular Disease Mother     Thyroid Disease Sister     Hyperlipidemia Sister     Depression Sister     Anxiety Disorder Sister     Osteoporosis Sister     Musculoskeletal Disorder Son     Hypertension Son     Diabetes Father     Prostate Cancer Father     Cancer - colorectal Maternal Grandfather     Diabetes Brother     Depression Brother     Asthma No family hx of     C.A.D. No family hx of     Breast  Cancer No family hx of     Anesthesia Reaction No family hx of     Blood Disease No family hx of     Ovarian Cancer No family hx of         Allergies:  Latex and Nkda [no known drug allergy]      Current Medications:  Current Facility-Administered Medications   Medication Dose Route Frequency Provider Last Rate Last Admin    acetaminophen (TYLENOL) tablet 650 mg  650 mg Oral Q4H PRN Francois Dale MD        acetaminophen (TYLENOL) tablet 650 mg  650 mg Oral Q6H Marybteh Acharya PA-C   650 mg at 08/01/25 0305    calcium carbonate (TUMS) chewable tablet 1,000 mg  1,000 mg Oral 4x Daily PRN Francois Dale MD        carbidopa-levodopa (SINEMET)  MG per tablet 1 tablet  1 tablet Oral BID Francois Dale MD   1 tablet at 07/31/25 1834    escitalopram (LEXAPRO) tablet 20 mg  20 mg Oral Daily Francois Dale MD   20 mg at 07/31/25 1834    metoprolol (LOPRESSOR) injection 5 mg  5 mg Intravenous Q1H PRN Marybeth Acharya PA-C   5 mg at 07/31/25 2341    metoprolol tartrate (LOPRESSOR) half-tab 12.5 mg  12.5 mg Oral BID Marybeth Acharya PA-C        naloxone (NARCAN) injection 0.2 mg  0.2 mg Intravenous Q2 Min PRN Zion Perez MD        Or    naloxone (NARCAN) injection 0.4 mg  0.4 mg Intravenous Q2 Min PRN Zion Perez MD        Or    naloxone (NARCAN) injection 0.2 mg  0.2 mg Intramuscular Q2 Min PRN Zion Perez MD        Or    naloxone (NARCAN) injection 0.4 mg  0.4 mg Intramuscular Q2 Min PRN Zion Perez MD        ondansetron (ZOFRAN ODT) ODT tab 4 mg  4 mg Oral Q6H PRN Marybeth Acharya PA-C        Or    ondansetron (ZOFRAN) injection 4 mg  4 mg Intravenous Q6H PRN Marybeth Acharya PA-C        oxyCODONE (ROXICODONE) tablet 5 mg  5 mg Oral Q4H PRN Marybeth Acharya PA-C        oxyCODONE IR (ROXICODONE) half-tab 2.5 mg  2.5 mg Oral Q4H PRN Marybeth Acharya PA-C        Patient is already receiving anticoagulation with heparin,  enoxaparin (LOVENOX), warfarin (COUMADIN)  or other anticoagulant medication   Does not apply Continuous PRN Marybeth Acharya PA-C        prochlorperazine (COMPAZINE) injection 5 mg  5 mg Intravenous Q6H PRN Marybeth Acharya PA-C        Or    prochlorperazine (COMPAZINE) tablet 5 mg  5 mg Oral Q6H PRN Marybeth Acharya PA-C        [Held by provider] rivaroxaban ANTICOAGULANT (XARELTO) tablet 20 mg  20 mg Oral Daily with supper Marybeth Acharya PA-C        senna-docusate (SENOKOT-S/PERICOLACE) 8.6-50 MG per tablet 1 tablet  1 tablet Oral BID PRN Marybeth Acharya PA-C        Or    senna-docusate (SENOKOT-S/PERICOLACE) 8.6-50 MG per tablet 2 tablet  2 tablet Oral BID PRN Marybeth Acharya PA-C           Review of Systems:  See H&P     Physical Exam:  Temp:  [97.5  F (36.4  C)-98  F (36.7  C)] 97.8  F (36.6  C)  Pulse:  [] 72  Resp:  [12-30] 18  BP: ()/() 109/71  SpO2:  [92 %-96 %] 94 %    General: On examination, the patient is resting comfortably, NAD, awake, and alert and oriented to person, place, time, and general circumstances  SKIN: No deformity noted in the bilateral lower extremities, no increased swelling.   Pulses:  Bilateral feet are warm and well perfused  Sensation: intact and equal bilaterally to the distal lower extremities.  ROM: FROM of the bilateral feet and ankles, able to independently SLR bilaterally. No pain with passive logroll of the right hip.     Pertinent Labs  Lab Results: personally reviewed.  Lab Results   Component Value Date    WBC 4.2 08/01/2025    HGB 11.6 (L) 08/01/2025    HCT 36.7 08/01/2025    MCV 90 08/01/2025     08/01/2025       Pertinent Radiology  Radiology Results: images and radiology report reviewed  Recent Results (from the past 24 hours)                                         Pelvis XR w/ unilateral hip right    Narrative    EXAM: XR PELVIS AND HIP RIGHT 1 VIEW  LOCATION: [facility]  DATE:  7/31/2025    INDICATION: fall with pain  COMPARISON: None.      Impression    IMPRESSION: Normal joint spaces and alignment. No fracture. Osteopenia.   CT Hip Right w/o Contrast    Narrative    EXAM: CT HIP RIGHT W/O CONTRAST  LOCATION: [facility]  DATE: 7/31/2025    INDICATION: Fall with right hip pain. X-ray negative.  COMPARISON: Same-day radiograph.  TECHNIQUE: Noncontrast. Axial, sagittal and coronal thin-section reconstruction. Dose reduction techniques were used.     FINDINGS:   Small field-of-view right hip images, as well as axial large field-of-view images extending from the midportion of the right distally to the proximal thigh are available for interpretation. The sacrum is not assessed.    Bones are demineralized. Acute mildly displaced fracture right inferior pubic ramus. Nondisplaced fracture right superior pubic root at the puboacetabular junction.    Right hip joint space is maintained. There is no dislocation.    Soft tissue contusion lateral subcutaneous tissues of the right hip.    Scattered atherosclerotic vascular calcifications. Sigmoid colon diverticulosis.      Impression    IMPRESSION:  1.  Acute mildly displaced right inferior pubic ramus fracture.  2.  Acute nondisplaced fracture right superior pubic root at the puboacetabular junction.  3.  Bone demineralization.      NOTE: ABNORMAL REPORT    THE DICTATION ABOVE DESCRIBES AN ABNORMALITY FOR WHICH FOLLOW-UP IS NEEDED.          Attestation:  I have reviewed today's vital signs, notes, medications, labs and imaging.     Ash Cordero PA-C

## 2025-08-01 NOTE — MEDICATION SCRIBE - ADMISSION MEDICATION HISTORY
Medication Scribe Admission Medication History    Admission medication history is complete. The information provided in this note is only as accurate as the sources available at the time of the update.    Information Source(s): Patient and Family member via in-person and phone    Pertinent Information: Confirmed with sister that Carbidopa-levodopa is taken BID.     Changes made to PTA medication list:  Added: None  Deleted: None  Changed: None    Allergies reviewed with patient and updates made in EHR: yes    Medication History Completed By: Aguila Tidwell 8/1/2025 10:28 AM    PTA Med List   Medication Sig Last Dose/Taking    acetaminophen (TYLENOL) 500 MG tablet Take 500 mg by mouth nightly as needed for pain More than a month    carbidopa-levodopa (SINEMET)  MG tablet Take 1 tablet by mouth twice daily 7/31/2025 Morning    escitalopram (LEXAPRO) 20 MG tablet Take 1 tablet (20 mg) by mouth daily. 7/31/2025 Morning    metoprolol tartrate (LOPRESSOR) 25 MG tablet Take 0.5 tablets (12.5 mg) by mouth 2 times daily. 7/31/2025 Morning    rivaroxaban ANTICOAGULANT (XARELTO) 20 MG TABS tablet Take 1 tablet (20 mg) by mouth daily (with dinner). 7/30/2025 Evening

## 2025-08-01 NOTE — PROGRESS NOTES
"[facility] ADMISSION NOTE    Patient admitted to room 2300 at approximately 2049 via cart from emergency room. Patient was accompanied by transport tech.     Verbal SBAR report received from ADELA To prior to patient arrival.     Patient trasferred to bed via air jazmín. Patient alert and oriented X 3. The patient is not having any pain.  . Admission vital signs: Blood pressure (!) 144/105, pulse (!) 131, temperature 97.5  F (36.4  C), temperature source Oral, resp. rate 20, height 1.626 m (5' 4\"), weight 51.3 kg (113 lb 1.5 oz), SpO2 96%, not currently breastfeeding. Patient was oriented to plan of care, call light, bed controls, tv, telephone, bathroom, and visiting hours.     Risk Assessment    The following safety risks were identified during admission: fall. Yellow risk band applied: YES.     Skin Initial Assessment    This writer admitted this patient and completed a full skin assessment and Jimmie score in the Adult PCS flowsheet.   Photo documentation of skin problem and/or wound competed via [employer] application (located under Media):  N/A    Appropriate interventions initiated as needed.     Secondary skin check completed by ADELA Long.    Jimmie Risk Assessment  Sensory Perception: 4-->no impairment  Moisture: 3-->occasionally moist  Activity: 3-->walks occasionally  Mobility: 3-->slightly limited  Nutrition: 3-->adequate  Friction and Shear: 3-->no apparent problem  Jimmie Score: 19  Moisture Interventions: Urinary collection device  Mattress: Standard gel/foam mattress (IsoFlex, Atmos Air, etc.)  Bed Frame: Standard width and length    Education    Patient has a [place] to Observation order: Yes  Observation education completed and documented: Yes      Anita Kamara RN      "

## 2025-08-01 NOTE — PLAN OF CARE
Problem: Adult Inpatient Plan of Care  Goal: Plan of Care Review  Description: The Plan of Care Review/Shift note should be completed every shift.  The Outcome Evaluation is a brief statement about your assessment that the patient is improving, declining, or no change.  This information will be displayed automatically on your shift  note.  Flowsheets (Taken 8/1/2025 8930)  Plan of Care Reviewed With:   patient   family  Goal: Absence of Hospital-Acquired Illness or Injury  Intervention: Identify and Manage Fall Risk  Recent Flowsheet Documentation  Taken 8/1/2025 1337 by Mallorie Holloway RN  Safety Promotion/Fall Prevention:   activity supervised   clutter free environment maintained   lighting adjusted   mobility aid in reach   nonskid shoes/slippers when out of bed   patient and family education  Intervention: Prevent and Manage VTE (Venous Thromboembolism) Risk  Recent Flowsheet Documentation  Taken 8/1/2025 1337 by Mallorie Holloway RN  VTE Prevention/Management: SCDs off (sequential compression devices)  Intervention: Prevent Infection  Recent Flowsheet Documentation  Taken 8/1/2025 1337 by Mallorie Holloway RN  Infection Prevention: hand hygiene promoted  Goal: Optimal Comfort and Wellbeing  Intervention: Provide Person-Centered Care  Recent Flowsheet Documentation  Taken 8/1/2025 1337 by Mallorie Holloway RN  Trust Relationship/Rapport:   care explained   choices provided   questions encouraged   questions answered   reassurance provided     Problem: Delirium  Goal: Improved Behavioral Control  Intervention: Minimize Safety Risk  Recent Flowsheet Documentation  Taken 8/1/2025 1337 by Mallorie Holloway RN  Enhanced Safety Measures: patient/family teach back on injury risk  Trust Relationship/Rapport:   care explained   choices provided   questions encouraged   questions answered   reassurance provided     Problem: Fall Injury Risk  Goal: Absence of Fall and Fall-Related Injury  Intervention: Promote Injury-Free  Environment  Recent Flowsheet Documentation  Taken 8/1/2025 1337 by Mallorie Holloway, RN  Safety Promotion/Fall Prevention:   activity supervised   clutter free environment maintained   lighting adjusted   mobility aid in reach   nonskid shoes/slippers when out of bed   patient and family education   Pt is A&O. A2 WBAT utilizing a walker and gaitbelt. Cardiac telemetry indicates Atrial flutter. Complaints of pain with movement. Pt is able to utilize call light to make needs known. Plan is to go to TCU on 08/01/2025.

## 2025-08-02 ASSESSMENT — ACTIVITIES OF DAILY LIVING (ADL)
ADLS_ACUITY_SCORE: 46
ADLS_ACUITY_SCORE: 47
ADLS_ACUITY_SCORE: 42
ADLS_ACUITY_SCORE: 47
ADLS_ACUITY_SCORE: 47
ADLS_ACUITY_SCORE: 42
ADLS_ACUITY_SCORE: 47
ADLS_ACUITY_SCORE: 42
ADLS_ACUITY_SCORE: 47
ADLS_ACUITY_SCORE: 45
ADLS_ACUITY_SCORE: 47
ADLS_ACUITY_SCORE: 42
ADLS_ACUITY_SCORE: 47
ADLS_ACUITY_SCORE: 45
ADLS_ACUITY_SCORE: 45
ADLS_ACUITY_SCORE: 42
ADLS_ACUITY_SCORE: 45
ADLS_ACUITY_SCORE: 42
ADLS_ACUITY_SCORE: 42
ADLS_ACUITY_SCORE: 46
ADLS_ACUITY_SCORE: 47

## 2025-08-02 NOTE — UTILIZATION REVIEW
=============================    Concurrent stay review; Secondary Review Determination - [facility]    [facility]        Under the authority of the Utilization Management Committee, the utilization review process indicated a secondary review on the above patient.  The review outcome is based on review of the medical records, discussions with staff, and applying clinical experience noted on the date of the review.        (x) Outpatient half-way status is appropriate       RATIONALE FOR DETERMINATION:     85 year old female admitted on 7/31/2025 with a past medical history significant for atrial fibrillation on anticoagulation, history of stroke, presumed Parkinson's disease, and anxiety / depression, had a mechanical fall at home and she was admitted with pubic rami fractures.  Ortho recommended medical management.  Asymptomatic pauses on telemetry while sleeping.  Digoxin stopped  PT recommended TCU.  Patient is waiting for TCU placement    Patient delayed discharge is related to disposition, there is no medical necessity for inpatient admission at the time of this review. If there is a change in patient status, please resend for review.    Dr Prieto notified    The information on this document is developed by the utilization review team in order for the business office to ensure compliance.  This only denotes the appropriateness of proper admission status and does not reflect the quality of care rendered.       The definitions of Inpatient Status and Observation Status used in making the determination above are those provided in the CMS Coverage Manual, Chapter 1 and Chapter 6, section 70.4.       Sincerely,     BIPIN WHEELER MD   Utilization Review  Physician Advisor  [facility]

## 2025-08-02 NOTE — PROGRESS NOTES
"[facility]    Medicine Progress Note - Hospitalist Service    Date of Admission:  7/31/2025    Assessment & Plan   85 year old female admitted on 7/31/2025 with a past medical history significant for atrial fibrillation on anticoagulation, history of stroke, presumed Parkinson's disease, and anxiety / depression. She experienced a fall at home this morning around 10:30 am and was found to have pubic rami fractures.     Update [ August 2, 2025 ]:  Had multiple (asymptomatic) pauses on telemetery with the longest of 8 secs (underlying rhythm was flutter, 8 seconds without av conduction), which she was likely sleeping for. Her total digitizing dose would likely be 600mcg max (12mcg/kg body weight) however she a 1,000mcg load of oral digoxin yesterday.     Suspect this is due to increased vagal tone / effects from digoxin, plausible that her k+ may have decreased from 08/01 (was 3.9) increasing the effect of digoxin and she was also given a supratherapeutic loading dose.     Digoxin stopped, no plans for maintenance.    She is med ready for TCU.      Closed fracture of multiple rami of right pubis, initial encounter   Patient presented to the ED on 7/31/2025 after taking a fall onto the right hip at home (see below)  Pelvic XR: \"w/unilateral right hip showed no fracture, normal joint spaces and alignment, osteopenia.\"  CT right hip: \"w/o contrast revealed the following: Acute mildly displaced right inferior pubic ramus fracture, acute nondisplaced fracture right superior pubic root at the puboacetabular junction, bone demineralization\"  Patient lives alone and is unable to safely ambulate due to pain.   Case discussed between emergency department and on-call ortho.  - Ortho consulted -- wt bear as tolerated  -- PT eval -- needs TCU and patient agreeable      Fall   Head trauma, on anticoagulation  Lost balance and fell on 7/31, head trauma but no loss of consciousness. No associated dizziness " / lightheadedness. Is on Xarelto 20 mg daily, last dose 7/30. Endorses new onset headache and some neck pain, as well as pain with ambulation (see above).   CT head and cervical spine w/o contrast ordered in ED were unremarkable for any acute intracranial processes, fractures, or post traumatic subluxation.   No focal deficits on admission exam, cranial nerves WNL.     - Repeat CT scan not indicated at this time  - restart in Saturday to minimize bleeding from pelvic fx      Rapid atrial flutter  Previously diagnosed. Rate controlled prior to admission with metoprolol tartrate 12.5 mg bid. Anticoagulated prior to admission with Xarelto 20 mg q pm. Missed morning metoprolol dose 7/31/2025 and had a heart rate in the 120-140's in the emergency department that improved with IV metoprolol and resumption of oral metoprolol. Patient asymptomatic despite rapid rate.   - Monitor on telemetry   - Metoprolol 12.5 bid, additional 12.5mg bid prn hr >110   - stopped digoxin given 8 sec pause  - See above regarding Xarelto       Possible parkinson's  PCP noted shuffling gate with cogwheel rigidity on 5/2024. She received a clinical diagnosis of Parkinsonism, but did not complete a full work-up with neurology (transportation issues). Was started on empiric carbidopa-levodopa which patient says helps to control her symptoms.   - Continue home carbidopa-levodopa     History of stroke  Cerebrovascular accident (CVA) due to embolism of right middle cerebral artery   Hyperlipidemia LDL goal <130  Presented to the ED on 8/12/2024 with weakness and pain after a fall she suffered 3 weeks prior. She also reported that she hit her head for which a head CT was obtained and incidentally showed a subacute right corpus striatum infarct which was then confirmed on MRI. She was not on anticoagulation for her atrial fibrillation prior to this infarct. Is on DOAC as noted above. No new stroke symptoms at time of current admission.   - See above  "regarding Xarelto     Recurrent major depressive disorder, in partial remission  Anxiety  Managed prior to admission with Escitalopram 20 mg daily for depression and anxiety. Patient states \"I feel like I need a bump up in my medication\" for her anxiety, admits to feeling more anxious lately but is calm at time of admission.  - Continue home Escitalopram      Personal history of malignant neoplasm of breast  Right breast cancer treated with localized lumpectomy and radiation in 2009.  Patient states she has \"not had any issues in a long time.\"  - Continue with outpatient surveillance           Impression:   Principal Problem:    Closed fracture of multiple rami of right pubis, initial encounter (H)  Active Problems:    Personal history of malignant neoplasm of breast    Hyperlipidemia LDL goal <130    Anxiety    Osteoporosis    Recurrent major depressive disorder, in partial remission    Rapid atrial fibrillation (H)    History of stroke    Closed fracture of sacrum (H)    Cerebrovascular accident (CVA) due to embolism of right middle cerebral artery (H)      Plan:  as above    DVT Prophylaxis: DOAC  Code Status: No CPR- Do NOT Intubate         Observation Goals: -diagnostic tests and consults completed and resulted, -vital signs normal or at patient baseline, -adequate pain control on oral analgesics, -safe disposition plan has been identified, Nurse to notify provider when observation goals have been met and patient is ready for discharge.  Diet: Regular Diet Adult    DVT Prophylaxis: DOAC  Pratt Catheter: Not present  Lines: None     Cardiac Monitoring: ACTIVE order. Indication: Tachyarrhythmias, acute (48 hours)  Code Status: No CPR- Do NOT Intubate      Clinically Significant Risk Factors Present on Admission                # Drug Induced Coagulation Defect: home medication list includes an anticoagulant medication                  # Financial/Environmental Concerns: none         Social Drivers of Health  "   Depression: At risk (11/14/2024)    PHQ-2     PHQ-2 Score: 6          Disposition Plan     Medically Ready for Discharge: Ready Now             Ernesto Prieto DO  Hospitalist Service  [facility]  Securely message with [employer] (more info)  Text page via [employer] Paging/Directory   ______________________________________________________________________    Interval History   Asymptomatic pauses on tele overnight     Physical Exam   Vital Signs: Temp: 97.3  F (36.3  C) Temp src: Oral BP: (!) 150/107 Pulse: 82   Resp: 16 SpO2: 96 % O2 Device: None (Room air)    Weight: 113 lbs 1.54 oz    Physical Exam  Constitutional:       General: Pt is not in acute distress.  HENT:      Head: Normocephalic and atraumatic.      Nose: Nose normal.   Eyes:      Conjunctiva/sclera: Conjunctivae normal.   Pulmonary:      Effort: Pulmonary effort is normal.   Abdominal:      General: Abdomen is flat.   Skin:     Findings: No rash.   Neurological:      General: No focal deficit present.      Mental Status: Pt is alert.   Psychiatric:         Mood and Affect: Mood normal.       Medical Decision Making       30 MINUTES SPENT BY ME on the date of service doing chart review, history, exam, documentation & further activities per the note.      Data         Imaging results reviewed over the past 24 hrs:   No results found for this or any previous visit (from the past 24 hours).

## 2025-08-02 NOTE — PLAN OF CARE
Problem: Adult Inpatient Plan of Care  Goal: Absence of Hospital-Acquired Illness or Injury  Outcome: Progressing  Intervention: Identify and Manage Fall Risk  Recent Flowsheet Documentation  Taken 8/2/2025 1614 by Pippa Redding RN  Safety Promotion/Fall Prevention:   activity supervised   clutter free environment maintained  Taken 8/2/2025 0745 by Pippa Redding RN  Safety Promotion/Fall Prevention:   activity supervised   clutter free environment maintained  Intervention: Prevent Skin Injury  Recent Flowsheet Documentation  Taken 8/2/2025 1614 by Pippa Redding RN  Body Position: position changed independently  Taken 8/2/2025 0745 by Pippa Redding RN  Body Position: position changed independently  Intervention: Prevent and Manage VTE (Venous Thromboembolism) Risk  Recent Flowsheet Documentation  Taken 8/2/2025 1614 by Pippa Redding RN  VTE Prevention/Management: SCDs off (sequential compression devices)  Taken 8/2/2025 0745 by Pippa Redding RN  VTE Prevention/Management: SCDs off (sequential compression devices)  Intervention: Prevent Infection  Recent Flowsheet Documentation  Taken 8/2/2025 1614 by Pippa Redding RN  Infection Prevention: rest/sleep promoted  Taken 8/2/2025 0745 by Pippa Redding RN  Infection Prevention: rest/sleep promoted     Problem: Adult Inpatient Plan of Care  Goal: Optimal Comfort and Wellbeing  Outcome: Progressing  Intervention: Monitor Pain and Promote Comfort  Recent Flowsheet Documentation  Taken 8/2/2025 0745 by Pippa Redding RN  Pain Management Interventions: repositioned   Goal Outcome Evaluation:      Plan of Care Reviewed With: patient    Overall Patient Progress: no change     Pt up with A 1 and walker, moves very slow, seems fearful of falling.  Offered reassurance.  Up in chair for meals.  Denies pain at rest, increased pain with any activity.  Taking scheduled tylenol, she is reluctant to take any oxycodone.  Agreeable to  trying ice packs to hip, relief from this. Appetite fair, voiding, had BM today.  Denies CP or SOA.  Tele aflutter, rate seen > 110 during meal times and with activity, pt asymptomatic.  Rate 70s-80s at rest.

## 2025-08-02 NOTE — PLAN OF CARE
Problem: Adult Inpatient Plan of Care  Goal: Optimal Comfort and Wellbeing  Outcome: Progressing  Intervention: Provide Person-Centered Care  Recent Flowsheet Documentation  Taken 8/2/2025 0000 by Kishore Hernandez RN  Trust Relationship/Rapport:   care explained   choices provided   questions encouraged   questions answered   reassurance provided     Patient alert and oriented x4. Reports pain has been controlled with scheduled medications. Continues on telemetry; [facility] called to inform patient had low heart rate of 30 tonight and then later a few episodes of pauses within a minute and with HR to zero. Vitals WNL and patient asymptomatic. Solo LEVINE updated, and received EKG order to evaluate.

## 2025-08-03 ENCOUNTER — APPOINTMENT (OUTPATIENT)
Dept: OCCUPATIONAL THERAPY | Facility: CLINIC | Age: 85
End: 2025-08-03
Attending: STUDENT IN AN ORGANIZED HEALTH CARE EDUCATION/TRAINING PROGRAM

## 2025-08-03 ASSESSMENT — ACTIVITIES OF DAILY LIVING (ADL)
ADLS_ACUITY_SCORE: 47

## 2025-08-03 NOTE — PLAN OF CARE
Problem: Adult Inpatient Plan of Care  Goal: Optimal Comfort and Wellbeing  Outcome: Progressing     Problem: Pain Acute  Goal: Optimal Pain Control and Function  Outcome: Progressing     Patient is alert and oriented x4, able to make needs known. Reports pain is very minimal at rest, but will increase with any movement. Declined anything for pain other than scheduled tylenol and ice packs. Pure wick in place overnight. Continues on telemetry.

## 2025-08-03 NOTE — PLAN OF CARE
Problem: Adult Inpatient Plan of Care  Goal: Absence of Hospital-Acquired Illness or Injury  Intervention: Identify and Manage Fall Risk  Recent Flowsheet Documentation  Taken 8/3/2025 1532 by Pippa Redding RN  Safety Promotion/Fall Prevention:   activity supervised   clutter free environment maintained  Taken 8/3/2025 0847 by Pippa Redding RN  Safety Promotion/Fall Prevention:   activity supervised   clutter free environment maintained     Problem: Adult Inpatient Plan of Care  Goal: Optimal Comfort and Wellbeing  Outcome: Progressing  Intervention: Monitor Pain and Promote Comfort  Recent Flowsheet Documentation  Taken 8/3/2025 1531 by Pippa Redding RN  Pain Management Interventions: cold applied   Goal Outcome Evaluation:      Plan of Care Reviewed With: patient    Overall Patient Progress: improving    Pt c/o bilateral hip pain, R>L, increased with activity.  She reports pain controlled with tylenol and ice packs.  Up with A1 and walker, moves very slow.  Tolerating diet, voiding, bm today.  Denies CP or SOA.  Tele aflutter.

## 2025-08-03 NOTE — PROGRESS NOTES
[facility]    Medicine Progress Note - Hospitalist Service    Date of Admission:  7/31/2025    Assessment & Plan   85 year old female admitted on 7/31/2025 with a past medical history significant for atrial fibrillation on anticoagulation, history of stroke, presumed Parkinson's disease, and anxiety / depression. She experienced a fall at home this morning around 10:30 am and was found to have pubic rami fractures.     Update [ August 3, 2025 ]:  TODAY- having 2-3 second pauses (less duration/frequent than yesterday) while RN doing patient care. Patient is unaware of this, but does drift off to sleep (even while doing patient care) which makes me question if some cerebral hypoperfusion is occurring. Non-focal exam however.    HR 130s-150s, titrating her beta blocker to help, ideally would use IV prn for titration but will utilize oral due to observation status.    Patient admitted for fall -> pubic rami fx -> decreased functionality requiring TCU    She has [facility] which on August 1st requires a prior authorization for TCU.     Patient deemed medically ready as of 08/02/2025, and she remains medically ready today. While the patient denies symptoms related to the pause (possibly due to being asleep), an 8 second pause while having flutter with variable conduction and bradycardic heart rates (without pause, but much lower with it) is unlikely to provide enough perfusion to the brain/body so it's possible she doesn't quite remember it.     She has a discharge plan in place and is med ready for TCU. A facility is already identified. We are simply waiting on HER insurance to be able to get her to a facility, so I will keep her observation rather than switch her to [facility] care as recommended by utilization review.      Closed fracture of multiple rami of right pubis, initial encounter   Patient presented to the ED on 7/31/2025 after taking a fall onto the right hip at home (see below)  Pelvic  "XR: \"w/unilateral right hip showed no fracture, normal joint spaces and alignment, osteopenia.\"  CT right hip: \"w/o contrast revealed the following: Acute mildly displaced right inferior pubic ramus fracture, acute nondisplaced fracture right superior pubic root at the puboacetabular junction, bone demineralization\"  Patient lives alone and is unable to safely ambulate due to pain.   Case discussed between emergency department and on-call ortho.  - Ortho consulted -- wt bear as tolerated  -- PT eval -- needs TCU and patient agreeable      Fall   Head trauma, on anticoagulation  Lost balance and fell on 7/31, head trauma but no loss of consciousness. No associated dizziness / lightheadedness. Is on Xarelto 20 mg daily, last dose 7/30. Endorses new onset headache and some neck pain, as well as pain with ambulation (see above).   CT head and cervical spine w/o contrast ordered in ED were unremarkable for any acute intracranial processes, fractures, or post traumatic subluxation.   No focal deficits on admission exam, cranial nerves WNL.     - Repeat CT scan not indicated at this time  - restart in Saturday to minimize bleeding from pelvic fx      Rapid atrial flutter  Previously diagnosed. Rate controlled prior to admission with metoprolol tartrate 12.5 mg bid. Anticoagulated prior to admission with Xarelto 20 mg q pm. Missed morning metoprolol dose 7/31/2025 and had a heart rate in the 120-140's in the emergency department that improved with IV metoprolol and resumption of oral metoprolol. Patient asymptomatic despite rapid rate.   - Monitor on telemetry   - Metoprolol 12.5 bid, additional 12.5mg bid prn hr >110   - stopped digoxin given 8 sec pause  - See above regarding Xarelto       Possible parkinson's  PCP noted shuffling gate with cogwheel rigidity on 5/2024. She received a clinical diagnosis of Parkinsonism, but did not complete a full work-up with neurology (transportation issues). Was started on empiric " "carbidopa-levodopa which patient says helps to control her symptoms.   - Continue home carbidopa-levodopa     History of stroke  Cerebrovascular accident (CVA) due to embolism of right middle cerebral artery   Hyperlipidemia LDL goal <130  Presented to the ED on 8/12/2024 with weakness and pain after a fall she suffered 3 weeks prior. She also reported that she hit her head for which a head CT was obtained and incidentally showed a subacute right corpus striatum infarct which was then confirmed on MRI. She was not on anticoagulation for her atrial fibrillation prior to this infarct. Is on DOAC as noted above. No new stroke symptoms at time of current admission.   - See above regarding Xarelto     Recurrent major depressive disorder, in partial remission  Anxiety  Managed prior to admission with Escitalopram 20 mg daily for depression and anxiety. Patient states \"I feel like I need a bump up in my medication\" for her anxiety, admits to feeling more anxious lately but is calm at time of admission.  - Continue home Escitalopram      Personal history of malignant neoplasm of breast  Right breast cancer treated with localized lumpectomy and radiation in 2009.  Patient states she has \"not had any issues in a long time.\"  - Continue with outpatient surveillance           Impression:   Principal Problem:    Closed fracture of multiple rami of right pubis, initial encounter (H)  Active Problems:    Personal history of malignant neoplasm of breast    Hyperlipidemia LDL goal <130    Anxiety    Osteoporosis    Recurrent major depressive disorder, in partial remission    Rapid atrial fibrillation (H)    History of stroke    Closed fracture of sacrum (H)    Cerebrovascular accident (CVA) due to embolism of right middle cerebral artery (H)      Plan:  as above    DVT Prophylaxis: DOAC  Code Status: No CPR- Do NOT Intubate       Observation Goals: -diagnostic tests and consults completed and resulted, -vital signs normal or at " patient baseline, -adequate pain control on oral analgesics, -safe disposition plan has been identified, Nurse to notify provider when observation goals have been met and patient is ready for discharge.  Diet: Regular Diet Adult    DVT Prophylaxis: DOAC  Pratt Catheter: Not present  Lines: None     Cardiac Monitoring: ACTIVE order. Indication: Tachyarrhythmias, acute (48 hours)  Code Status: No CPR- Do NOT Intubate      Clinically Significant Risk Factors Present on Admission                # Drug Induced Coagulation Defect: home medication list includes an anticoagulant medication                  # Financial/Environmental Concerns: none         Social Drivers of Health    Depression: At risk (11/14/2024)    PHQ-2     PHQ-2 Score: 6          Disposition Plan     Medically Ready for Discharge: Ready Now             Ernesto Prieto DO  Hospitalist Service  [facility]  Securely message with [employer] (more info)  Text page via [employer] Paging/Directory   ______________________________________________________________________    Interval History   Continued pauses and tachyarrhythmias    Physical Exam   Vital Signs: Temp: (!) (P) 96.5  F (35.8  C) Temp src: (P) Axillary BP: (!) 141/87 Pulse: 75   Resp: (P) 16 SpO2: 94 % O2 Device: (P) None (Room air)    Weight: 113 lbs 1.54 oz    Physical Exam  Constitutional:       General: Pt is not in acute distress.  HENT:      Head: Normocephalic and atraumatic.      Nose: Nose normal.   Eyes:      Conjunctiva/sclera: Conjunctivae normal.   Pulmonary:      Effort: Pulmonary effort is normal.   Abdominal:      General: Abdomen is flat.   Skin:     Findings: No rash.   Neurological:      General: No focal deficit present.      Mental Status: Pt is alert.   Psychiatric:         Mood and Affect: Mood normal.       Medical Decision Making       45 MINUTES SPENT BY ME on the date of service doing chart review, history, exam, documentation & further activities per the  note.      Data     I have personally reviewed the following data over the past 24 hrs:    N/A  \   N/A   / N/A     137 102 9.5 /  144 (H)   4.1 23 0.62 \     ALT: N/A AST: N/A AP: N/A TBILI: N/A   ALB: 3.9 TOT PROTEIN: N/A LIPASE: N/A       Imaging results reviewed over the past 24 hrs:   No results found for this or any previous visit (from the past 24 hours).

## 2025-08-03 NOTE — PROGRESS NOTES
Call from tele received, pt had 2 pauses, 2.5 and 2.7 seconds, HR to 30s briefly. Pt asymptomatic, was resting in chair.  Now rate back to 70s.  Page sent to MD with update.

## 2025-08-04 ASSESSMENT — ACTIVITIES OF DAILY LIVING (ADL)
ADLS_ACUITY_SCORE: 49
ADLS_ACUITY_SCORE: 47
ADLS_ACUITY_SCORE: 47
ADLS_ACUITY_SCORE: 49
ADLS_ACUITY_SCORE: 47
ADLS_ACUITY_SCORE: 48
ADLS_ACUITY_SCORE: 49
ADLS_ACUITY_SCORE: 47
ADLS_ACUITY_SCORE: 51
ADLS_ACUITY_SCORE: 50
ADLS_ACUITY_SCORE: 49
ADLS_ACUITY_SCORE: 48
ADLS_ACUITY_SCORE: 49
ADLS_ACUITY_SCORE: 49
ADLS_ACUITY_SCORE: 47
ADLS_ACUITY_SCORE: 48
ADLS_ACUITY_SCORE: 49
ADLS_ACUITY_SCORE: 50
ADLS_ACUITY_SCORE: 49
ADLS_ACUITY_SCORE: 51

## 2025-08-04 NOTE — PLAN OF CARE
Assumed care of patient [id number]. Alert and oriented. Bedrest during this shift. Purewick placed for overnight per patient request. IV restarted due to leaking when flushed. Telemetry monitoring continues.

## 2025-08-04 NOTE — PROGRESS NOTES
Care Management Follow Up    Length of Stay (days): 0    Expected Discharge Date: 08/04/2025     Concerns to be Addressed: discharge planning     Patient plan of care discussed at interdisciplinary rounds: Yes    Anticipated Discharge Disposition: Transitional Care  Anticipated Discharge Services:    Anticipated Discharge DME: None    Patient/family educated on Medicare website which has current facility and service quality ratings: yes  Education Provided on the Discharge Plan: yes   Patient/Family in Agreement with the Plan:  yes    Referrals Placed by CM/SW: Internal Clinic Care Coordination  Private pay costs discussed: Not applicable    Discussed  Partnership in Safe Discharge Planning  document with patient/family: Yes     Handoff Completed: Yes, [facility] PCP: Internal handoff referral completed    Additional Information:    Per IDT rounds, pt remains medically stable for discharge.     Patient has been accepted at [facility] [address] [facility] (Phone: [phone]/ Fax: [phone]) pending prior auth approval.     Per Ana Laura in admissions at [facility], Prior auth remains pending at this time.     PLAN: [facility]- pending auth    SHIMA PÉREZ RN

## 2025-08-04 NOTE — PROGRESS NOTES
"[facility]    Medicine Progress Note - Hospitalist Service    Date of Admission:  7/31/2025    Assessment & Plan   85 year old female admitted on 7/31/2025 with a past medical history significant for atrial fibrillation on anticoagulation, history of stroke, presumed Parkinson's disease, and anxiety / depression. She experienced a fall at home this morning around 10:30 am and was found to have pubic rami fractures.     Update [ August 4, 2025 ]:    Still awaiting PA. TCU hasn't heard from [facility].    Patient deemed medically ready as of 08/02/2025, and she remains medically ready today.     She has a discharge plan in place and is med ready for TCU. A facility is already identified. We are simply waiting on HER insurance to be able to get her to a facility, so I will keep her observation rather than switch her to correction care as recommended by utilization review.      Closed fracture of multiple rami of right pubis, initial encounter   Patient presented to the ED on 7/31/2025 after taking a fall onto the right hip at home (see below)  Pelvic XR: \"w/unilateral right hip showed no fracture, normal joint spaces and alignment, osteopenia.\"  CT right hip: \"w/o contrast revealed the following: Acute mildly displaced right inferior pubic ramus fracture, acute nondisplaced fracture right superior pubic root at the puboacetabular junction, bone demineralization\"  Patient lives alone and is unable to safely ambulate due to pain.   Case discussed between emergency department and on-call ortho.  - Ortho consulted -- wt bear as tolerated  -- PT eval -- needs TCU and patient agreeable      Fall   Head trauma, on anticoagulation  Lost balance and fell on 7/31, head trauma but no loss of consciousness. No associated dizziness / lightheadedness. Is on Xarelto 20 mg daily, last dose 7/30. Endorses new onset headache and some neck pain, as well as pain with ambulation (see above).   CT head and cervical " spine w/o contrast ordered in ED were unremarkable for any acute intracranial processes, fractures, or post traumatic subluxation.   No focal deficits on admission exam, cranial nerves WNL.     - Repeat CT scan not indicated at this time  - restart in Saturday to minimize bleeding from pelvic fx      Rapid atrial flutter  Previously diagnosed. Rate controlled prior to admission with metoprolol tartrate 12.5 mg bid. Anticoagulated prior to admission with Xarelto 20 mg q pm. Missed morning metoprolol dose 7/31/2025 and had a heart rate in the 120-140's in the emergency department that improved with IV metoprolol and resumption of oral metoprolol. Patient asymptomatic despite rapid rate.   - Monitor on telemetry   - Metoprolol 12.5 bid, additional 12.5mg bid prn hr >110   - stopped digoxin given 8 sec pause  - See above regarding Xarelto       Possible parkinson's  PCP noted shuffling gate with cogwheel rigidity on 5/2024. She received a clinical diagnosis of Parkinsonism, but did not complete a full work-up with neurology (transportation issues). Was started on empiric carbidopa-levodopa which patient says helps to control her symptoms.   - Continue home carbidopa-levodopa     History of stroke  Cerebrovascular accident (CVA) due to embolism of right middle cerebral artery   Hyperlipidemia LDL goal <130  Presented to the ED on 8/12/2024 with weakness and pain after a fall she suffered 3 weeks prior. She also reported that she hit her head for which a head CT was obtained and incidentally showed a subacute right corpus striatum infarct which was then confirmed on MRI. She was not on anticoagulation for her atrial fibrillation prior to this infarct. Is on DOAC as noted above. No new stroke symptoms at time of current admission.   - See above regarding Xarelto     Recurrent major depressive disorder, in partial remission  Anxiety  Managed prior to admission with Escitalopram 20 mg daily for depression and anxiety.  "Patient states \"I feel like I need a bump up in my medication\" for her anxiety, admits to feeling more anxious lately but is calm at time of admission.  - Continue home Escitalopram      Personal history of malignant neoplasm of breast  Right breast cancer treated with localized lumpectomy and radiation in 2009.  Patient states she has \"not had any issues in a long time.\"  - Continue with outpatient surveillance           Impression:   Principal Problem:    Closed fracture of multiple rami of right pubis, initial encounter (H)  Active Problems:    Personal history of malignant neoplasm of breast    Hyperlipidemia LDL goal <130    Anxiety    Osteoporosis    Recurrent major depressive disorder, in partial remission    Rapid atrial fibrillation (H)    History of stroke    Closed fracture of sacrum (H)    Cerebrovascular accident (CVA) due to embolism of right middle cerebral artery (H)      Plan:  as above    DVT Prophylaxis: DOAC  Code Status: No CPR- Do NOT Intubate       Observation Goals: -diagnostic tests and consults completed and resulted, -vital signs normal or at patient baseline, -adequate pain control on oral analgesics, -safe disposition plan has been identified, Nurse to notify provider when observation goals have been met and patient is ready for discharge.  Diet: Regular Diet Adult    DVT Prophylaxis: DOAC  Pratt Catheter: Not present  Lines: None     Cardiac Monitoring: None  Code Status: No CPR- Do NOT Intubate      Clinically Significant Risk Factors Present on Admission                # Drug Induced Coagulation Defect: home medication list includes an anticoagulant medication                  # Financial/Environmental Concerns: none         Social Drivers of Health    Depression: At risk (11/14/2024)    PHQ-2     PHQ-2 Score: 6          Disposition Plan     Medically Ready for Discharge: Ready Now             Ernesto Prieto DO  Hospitalist Service  [facility]  Securely " message with [employer] (more info)  Text page via [employer] Paging/Directory   ______________________________________________________________________    Interval History   Awaiting prior auth    Physical Exam   Vital Signs: Temp: 97.9  F (36.6  C) Temp src: Oral BP: 135/87 Pulse: 77   Resp: 16 SpO2: 94 % O2 Device: None (Room air)    Weight: 113 lbs 1.54 oz        Medical Decision Making       25 MINUTES SPENT BY ME on the date of service doing chart review, history, exam, documentation & further activities per the note.      Data         Imaging results reviewed over the past 24 hrs:   No results found for this or any previous visit (from the past 24 hours).

## 2025-08-04 NOTE — PLAN OF CARE
Problem: Adult Inpatient Plan of Care  Goal: Plan of Care Review  Outcome: Progressing  Flowsheets (Taken 8/4/2025 1121)  Plan of Care Reviewed With: patient  Overall Patient Progress: improving     Problem: Fall Injury Risk  Goal: Absence of Fall and Fall-Related Injury  Outcome: Progressing  Intervention: Identify and Manage Contributors  Flowsheets  Taken 8/4/2025 1121  Self-Care Promotion: independence encouraged  Medication Review/Management: medications reviewed  Taken 8/4/2025 0938  Medication Review/Management: medications reviewed  Intervention: Promote Injury-Free Environment  Flowsheets  Taken 8/4/2025 1121  Safety Promotion/Fall Prevention:   activity supervised   assistive device/personal items within reach   clutter free environment maintained   mobility aid in reach   nonskid shoes/slippers when out of bed   room door open   room near nurse's station   safety round/check completed     Problem: Pain Acute  Goal: Optimal Pain Control and Function  Outcome: Progressing  Intervention: Optimize Psychosocial Wellbeing  Flowsheets (Taken 8/4/2025 1121)  Supportive Measures: active listening utilized  Intervention: Develop Pain Management Plan  Flowsheets (Taken 8/4/2025 1121)  Pain Management Interventions:   declines   rest   repositioned  Intervention: Prevent or Manage Pain  Flowsheets  Taken 8/4/2025 1121  Bowel Elimination Promotion:   adequate fluid intake promoted   ambulation promoted   commode/bedpan at bedside  Medication Review/Management: medications reviewed  Taken 8/4/2025 0938  Sensory Stimulation Regulation:   auditory stimulation minimized   care clustered   lighting decreased   quiet environment promoted  Bowel Elimination Promotion:   adequate fluid intake promoted   ambulation promoted   commode/bedpan at bedside  Medication Review/Management: medications reviewed     Goal Outcome Evaluation:    Patient is A&O x4, able to make her needs known appropriately. Patient very pleasant towards  staff. Patient's VSS, afebrile, on RA. Cardiac monitoring discontinued this AM by provider. Patient only reports very mild pain to her right hip, no PRN pain medications needed. Purewick in place. Will continue to monitor per plan of care.       Plan of Care Reviewed With: patient    Overall Patient Progress: improving

## 2025-08-04 NOTE — PLAN OF CARE
Problem: Adult Inpatient Plan of Care  Goal: Readiness for Transition of Care  Outcome: Progressing     Problem: Fall Injury Risk  Goal: Absence of Fall and Fall-Related Injury  Outcome: Progressing  Intervention: Identify and Manage Contributors  Recent Flowsheet Documentation  Taken 8/4/2025 0500 by Keiry Blank RN  Medication Review/Management: medications reviewed  Intervention: Promote Injury-Free Environment  Recent Flowsheet Documentation  Taken 8/4/2025 0500 by Keiry Blank RN  Safety Promotion/Fall Prevention:   activity supervised   clutter free environment maintained   nonskid shoes/slippers when out of bed   safety round/check completed     Problem: Pain Acute  Goal: Optimal Pain Control and Function  Outcome: Progressing  Intervention: Prevent or Manage Pain  Recent Flowsheet Documentation  Taken 8/4/2025 0500 by Keiry Blank RN  Medication Review/Management: medications reviewed   Goal Outcome Evaluation:         Patient alert and oriented X4.  Assist of 1-2 with gait belt and walker to bedside commode.  Purewick in place.  Telemetry patient.  Denies pain, N/V.

## 2025-08-05 ENCOUNTER — HOSPITAL ENCOUNTER (OUTPATIENT)
Facility: CLINIC | Age: 85
Discharge: NURSING, REHAB OR LONG-TERM CARE | End: 2025-08-07
Attending: STUDENT IN AN ORGANIZED HEALTH CARE EDUCATION/TRAINING PROGRAM | Admitting: STUDENT IN AN ORGANIZED HEALTH CARE EDUCATION/TRAINING PROGRAM
Payer: COMMERCIAL

## 2025-08-05 DIAGNOSIS — I48.92 ATRIAL FLUTTER WITH RAPID VENTRICULAR RESPONSE (H): Primary | ICD-10-CM

## 2025-08-05 DIAGNOSIS — S32.591A CLOSED FRACTURE OF MULTIPLE RAMI OF RIGHT PUBIS, INITIAL ENCOUNTER (H): ICD-10-CM

## 2025-08-05 DIAGNOSIS — Z71.89 OTHER SPECIFIED COUNSELING: Chronic | ICD-10-CM

## 2025-08-05 PROCEDURE — 250N000013 HC RX MED GY IP 250 OP 250 PS 637: Performed by: STUDENT IN AN ORGANIZED HEALTH CARE EDUCATION/TRAINING PROGRAM

## 2025-08-05 PROCEDURE — 101N000002 HC CUSTODIAL CARE DAILY

## 2025-08-05 RX ORDER — AMOXICILLIN 250 MG
2 CAPSULE ORAL 2 TIMES DAILY PRN
Status: DISCONTINUED | OUTPATIENT
Start: 2025-08-05 | End: 2025-08-07 | Stop reason: HOSPADM

## 2025-08-05 RX ORDER — CELECOXIB 50 MG/1
50 CAPSULE ORAL DAILY PRN
Status: DISCONTINUED | OUTPATIENT
Start: 2025-08-05 | End: 2025-08-07 | Stop reason: HOSPADM

## 2025-08-05 RX ORDER — ONDANSETRON 4 MG/1
4 TABLET, ORALLY DISINTEGRATING ORAL EVERY 6 HOURS PRN
Status: DISCONTINUED | OUTPATIENT
Start: 2025-08-05 | End: 2025-08-07 | Stop reason: HOSPADM

## 2025-08-05 RX ORDER — AMOXICILLIN 250 MG
1 CAPSULE ORAL 2 TIMES DAILY PRN
Status: DISCONTINUED | OUTPATIENT
Start: 2025-08-05 | End: 2025-08-07 | Stop reason: HOSPADM

## 2025-08-05 RX ORDER — PROCHLORPERAZINE MALEATE 5 MG/1
5 TABLET ORAL EVERY 6 HOURS PRN
Status: DISCONTINUED | OUTPATIENT
Start: 2025-08-05 | End: 2025-08-07 | Stop reason: HOSPADM

## 2025-08-05 RX ORDER — METOPROLOL TARTRATE 25 MG/1
25 TABLET, FILM COATED ORAL 2 TIMES DAILY
Status: DISCONTINUED | OUTPATIENT
Start: 2025-08-05 | End: 2025-08-07 | Stop reason: HOSPADM

## 2025-08-05 RX ORDER — ACETAMINOPHEN 325 MG/1
650 TABLET ORAL EVERY 4 HOURS PRN
Status: DISCONTINUED | OUTPATIENT
Start: 2025-08-05 | End: 2025-08-07 | Stop reason: HOSPADM

## 2025-08-05 RX ORDER — ONDANSETRON 2 MG/ML
4 INJECTION INTRAMUSCULAR; INTRAVENOUS EVERY 6 HOURS PRN
Status: DISCONTINUED | OUTPATIENT
Start: 2025-08-05 | End: 2025-08-07 | Stop reason: HOSPADM

## 2025-08-05 RX ORDER — NALOXONE HYDROCHLORIDE 0.4 MG/ML
0.2 INJECTION, SOLUTION INTRAMUSCULAR; INTRAVENOUS; SUBCUTANEOUS
Status: DISCONTINUED | OUTPATIENT
Start: 2025-08-05 | End: 2025-08-07 | Stop reason: HOSPADM

## 2025-08-05 RX ORDER — ACETAMINOPHEN 325 MG/1
650 TABLET ORAL EVERY 6 HOURS
Status: DISCONTINUED | OUTPATIENT
Start: 2025-08-05 | End: 2025-08-07 | Stop reason: HOSPADM

## 2025-08-05 RX ORDER — ACETAMINOPHEN 325 MG/1
975 TABLET ORAL EVERY 8 HOURS
Status: DISCONTINUED | OUTPATIENT
Start: 2025-08-05 | End: 2025-08-05 | Stop reason: DRUGHIGH

## 2025-08-05 RX ORDER — ESCITALOPRAM OXALATE 10 MG/1
20 TABLET ORAL DAILY
Status: DISCONTINUED | OUTPATIENT
Start: 2025-08-05 | End: 2025-08-07 | Stop reason: HOSPADM

## 2025-08-05 RX ORDER — NALOXONE HYDROCHLORIDE 0.4 MG/ML
0.4 INJECTION, SOLUTION INTRAMUSCULAR; INTRAVENOUS; SUBCUTANEOUS
Status: DISCONTINUED | OUTPATIENT
Start: 2025-08-05 | End: 2025-08-07 | Stop reason: HOSPADM

## 2025-08-05 RX ORDER — CARBIDOPA AND LEVODOPA 25; 100 MG/1; MG/1
1 TABLET ORAL 2 TIMES DAILY
Status: DISCONTINUED | OUTPATIENT
Start: 2025-08-05 | End: 2025-08-07 | Stop reason: HOSPADM

## 2025-08-05 RX ORDER — NAPROXEN 250 MG/1
250 TABLET ORAL 2 TIMES DAILY PRN
Status: DISCONTINUED | OUTPATIENT
Start: 2025-08-05 | End: 2025-08-07 | Stop reason: HOSPADM

## 2025-08-05 RX ORDER — ACETAMINOPHEN 325 MG/1
325 TABLET ORAL EVERY 8 HOURS PRN
Status: DISCONTINUED | OUTPATIENT
Start: 2025-08-05 | End: 2025-08-07 | Stop reason: HOSPADM

## 2025-08-05 RX ORDER — OXYCODONE HYDROCHLORIDE 5 MG/1
5 TABLET ORAL EVERY 4 HOURS PRN
Refills: 0 | Status: DISCONTINUED | OUTPATIENT
Start: 2025-08-05 | End: 2025-08-07 | Stop reason: HOSPADM

## 2025-08-05 RX ORDER — CALCIUM CARBONATE 500 MG/1
1000 TABLET, CHEWABLE ORAL 4 TIMES DAILY PRN
Status: DISCONTINUED | OUTPATIENT
Start: 2025-08-05 | End: 2025-08-07 | Stop reason: HOSPADM

## 2025-08-05 RX ADMIN — METOPROLOL TARTRATE 25 MG: 25 TABLET, FILM COATED ORAL at 20:42

## 2025-08-05 RX ADMIN — Medication 4 G: at 17:22

## 2025-08-05 RX ADMIN — ACETAMINOPHEN 650 MG: 325 TABLET ORAL at 15:33

## 2025-08-05 RX ADMIN — Medication 4 G: at 20:49

## 2025-08-05 RX ADMIN — RIVAROXABAN 20 MG: 10 TABLET, FILM COATED ORAL at 16:58

## 2025-08-05 RX ADMIN — CARBIDOPA AND LEVODOPA 1 TABLET: 25; 100 TABLET ORAL at 20:50

## 2025-08-05 RX ADMIN — ACETAMINOPHEN 650 MG: 325 TABLET ORAL at 20:50

## 2025-08-05 ASSESSMENT — ACTIVITIES OF DAILY LIVING (ADL)
ADLS_ACUITY_SCORE: 51
ADLS_ACUITY_SCORE: 48
WALKING_OR_CLIMBING_STAIRS_DIFFICULTY: YES
ADLS_ACUITY_SCORE: 51
ADLS_ACUITY_SCORE: 48
ADLS_ACUITY_SCORE: 48
ADLS_ACUITY_SCORE: 59
ADLS_ACUITY_SCORE: 59
CONCENTRATING,_REMEMBERING_OR_MAKING_DECISIONS_DIFFICULTY: YES
DOING_ERRANDS_INDEPENDENTLY_DIFFICULTY: YES
ADLS_ACUITY_SCORE: 58
ADLS_ACUITY_SCORE: 48
CONCENTRATING,_REMEMBERING_OR_MAKING_DECISIONS_DIFFICULTY: OTHER (SEE COMMENTS)
ADLS_ACUITY_SCORE: 48
ADLS_ACUITY_SCORE: 51
ADLS_ACUITY_SCORE: 48
VISION_MANAGEMENT: GLASSES
DOING_ERRANDS_INDEPENDENTLY_DIFFICULTY: YES
WEAR_GLASSES_OR_BLIND: YES
ADLS_ACUITY_SCORE: 51
ADLS_ACUITY_SCORE: 58
DRESSING/BATHING_DIFFICULTY: YES
ADLS_ACUITY_SCORE: 59
ADLS_ACUITY_SCORE: 58
ADLS_ACUITY_SCORE: 51
ADLS_ACUITY_SCORE: 51
DIFFICULTY_EATING/SWALLOWING: NO
DEPENDENT_IADLS:: INDEPENDENT
ADLS_ACUITY_SCORE: 59
TOILETING_ISSUES: YES
ADLS_ACUITY_SCORE: 59
ADLS_ACUITY_SCORE: 59
ADLS_ACUITY_SCORE: 51

## 2025-08-05 NOTE — PROGRESS NOTES
[facility]    Medicine Progress Note - Hospitalist Service    Date of Admission:  7/31/2025    Assessment & Plan   85 year old female admitted on 7/31/2025 with a past medical history significant for atrial fibrillation on anticoagulation, history of stroke, presumed Parkinson's disease, and anxiety / depression. She experienced a fall at home this morning around 10:30 am and was found to have pubic rami fractures.       Addendum: notified by utilization review today that (as far as they are aware) no patient whom has been made [facility] care has actually been charged for that and further they were able to guarantee that this patient would be be charged separately if she were to be made [facility] care. With this in mind, I agree with [facility] care.     Update [ August 5, 2025 ]:  As of today, 08/05, she remains medically ready (since 08/02) for discharge to TCU. We are still awaiting patient's insurance. A facility has been identified ([facility]), and is waiting to hear from patient's insurance. We are simply waiting on HER insurance to be able to get her to a facility, so I will keep her observation rather than switch her to [facility] care as recommended by utilization review. I decline to change the patient status as of right now.    Course:  Patient admitted (to observation) on 7/31 after a fall with resultant pelvic fracture. She has [employer] insurance which as of 08/01/2025 (1 day before she presented) changed their policy to require a prior authorization (PA) for TCU. Her PT/OT eval recommending TCU happened within 24hrs of presentation (H&P 1843pm 7/31; OT/PT notes in 0564 and 1208 respectively on 08/01) but due to [facility] requiring PA starting 08/01 (and due to the interesting decision by [facility] to change this policy on a Friday while also not having processing of any applications or PA/etc over the weekend) patient is still in the hospital.    I was contacted by our utilization  "review team, first on 08/02 (the day she had digoxin toxicity with severe bradycardia and long pauses with nearly 8 seconds without ventricular electrical activity) to make the patient assisted care.    I declined this.    I was contacted again on 08/04 by utilization review stating that patient has been 'deemed' assisted for >24 hours, and while I am not sure what 'deem' means in this context, as the patient's current provider, she is actually deemed (by me) to be observation status.     This was declined again.             Closed fracture of multiple rami of right pubis, initial encounter   Patient presented to the ED on 7/31/2025 after taking a fall onto the right hip at home (see below)  Pelvic XR: \"w/unilateral right hip showed no fracture, normal joint spaces and alignment, osteopenia.\"  CT right hip: \"w/o contrast revealed the following: Acute mildly displaced right inferior pubic ramus fracture, acute nondisplaced fracture right superior pubic root at the puboacetabular junction, bone demineralization\"  Patient lives alone and is unable to safely ambulate due to pain.   Case discussed between emergency department and on-call ortho.  - Ortho consulted -- wt bear as tolerated  -- PT eval -- needs TCU and patient agreeable      Fall   Head trauma, on anticoagulation  Lost balance and fell on 7/31, head trauma but no loss of consciousness. No associated dizziness / lightheadedness. Is on Xarelto 20 mg daily, last dose 7/30. Endorses new onset headache and some neck pain, as well as pain with ambulation (see above).   CT head and cervical spine w/o contrast ordered in ED were unremarkable for any acute intracranial processes, fractures, or post traumatic subluxation.   No focal deficits on admission exam, cranial nerves WNL.     Rapid atrial flutter  Previously diagnosed. Rate controlled prior to admission with metoprolol tartrate 12.5 mg bid. Anticoagulated prior to admission with Xarelto 20 mg q pm. Missed " "morning metoprolol dose 7/31/2025 and had a heart rate in the 120-140's in the emergency department that improved with IV metoprolol and resumption of oral metoprolol. Patient asymptomatic despite rapid rate.   8/3: increased lopressor to 25mg BID  8/5: has runs of flutter RIGHT before her next scheduled lorpessor dose, her blood pressures have been good with 25mg BID and her HR is 70s after lopressor, will increase to TID to avoid lapse    - Monitor on telemetry   - Lopressor 25mg (6-7am), 12.5mg (Lunch), 25mg at bedtime (9pm)   -  Xarelto       Possible parkinson's  PCP noted shuffling gate with cogwheel rigidity on 5/2024. She received a clinical diagnosis of Parkinsonism, but did not complete a full work-up with neurology (transportation issues). Was started on empiric carbidopa-levodopa which patient says helps to control her symptoms.   - Continue home carbidopa-levodopa     History of stroke  Cerebrovascular accident (CVA) due to embolism of right middle cerebral artery   Hyperlipidemia LDL goal <130  Presented to the ED on 8/12/2024 with weakness and pain after a fall she suffered 3 weeks prior. She also reported that she hit her head for which a head CT was obtained and incidentally showed a subacute right corpus striatum infarct which was then confirmed on MRI. She was not on anticoagulation for her atrial fibrillation prior to this infarct. Is on DOAC as noted above. No new stroke symptoms at time of current admission.   - See above regarding Xarelto     Recurrent major depressive disorder, in partial remission  Anxiety  Managed prior to admission with Escitalopram 20 mg daily for depression and anxiety. Patient states \"I feel like I need a bump up in my medication\" for her anxiety, admits to feeling more anxious lately but is calm at time of admission.  - Continue home Escitalopram      Personal history of malignant neoplasm of breast  Right breast cancer treated with localized lumpectomy and radiation " "in 2009.  Patient states she has \"not had any issues in a long time.\"  - Continue with outpatient surveillance           Impression:   Principal Problem:    Closed fracture of multiple rami of right pubis, initial encounter (H)  Active Problems:    Personal history of malignant neoplasm of breast    Hyperlipidemia LDL goal <130    Anxiety    Osteoporosis    Recurrent major depressive disorder, in partial remission    Rapid atrial fibrillation (H)    History of stroke    Closed fracture of sacrum (H)    Cerebrovascular accident (CVA) due to embolism of right middle cerebral artery (H)      Plan:  as above    DVT Prophylaxis: DOAC  Code Status: No CPR- Do NOT Intubate       Observation Goals: -diagnostic tests and consults completed and resulted, -vital signs normal or at patient baseline, -adequate pain control on oral analgesics, -safe disposition plan has been identified, Nurse to notify provider when observation goals have been met and patient is ready for discharge.  Diet: Regular Diet Adult    DVT Prophylaxis: DOAC  Pratt Catheter: Not present  Lines: None     Cardiac Monitoring: None  Code Status: No CPR- Do NOT Intubate      Clinically Significant Risk Factors Present on Admission                # Drug Induced Coagulation Defect: home medication list includes an anticoagulant medication                  # Financial/Environmental Concerns: none         Social Drivers of Health    Depression: At risk (11/14/2024)    PHQ-2     PHQ-2 Score: 6          Disposition Plan     Medically Ready for Discharge: Ready Now             Ernesto Prieto DO  Hospitalist Service  [facility]  Securely message with [employer] (more info)  Text page via [facility] Paging/Directory   ______________________________________________________________________    Interval History   She rates her pain as 0, but can't move her right leg without deep right groin/hip pain (pelvix fx). Have encouraged her to take tylenol and " non-narcotic pain meds so that she can move without pain, rather than not move to avoid pain.    Physical Exam   Vital Signs: Temp: 97.7  F (36.5  C) Temp src: Oral BP: (!) 125/96 Pulse: 72   Resp: 16 SpO2: 97 % O2 Device: None (Room air)    Weight: 113 lbs 1.54 oz    Physical Exam  Constitutional:       General: Pt is not in acute distress.  HENT:      Head: Normocephalic and atraumatic.      Nose: Nose normal.   Eyes:      Conjunctiva/sclera: Conjunctivae normal.   Pulmonary:      Effort: Pulmonary effort is normal.   Abdominal:      General: Abdomen is flat.   Skin:     Findings: No rash.   Neurological:      General: No focal deficit present.      Mental Status: Pt is alert.   Psychiatric:         Mood and Affect: Mood normal.       Medical Decision Making       35 MINUTES SPENT BY ME on the date of service doing chart review, history, exam, documentation & further activities per the note.      Data         Imaging results reviewed over the past 24 hrs:   No results found for this or any previous visit (from the past 24 hours).

## 2025-08-05 NOTE — PROVIDER NOTIFICATION
Notified Dr. Lenz at 0738 of apical pulse of 148 at 0735. Patient feeling anxious since she has gotten up (Took her heart rate immediately after and 10 minutes later and still 140s 150s). Bp 125/96. No chest pain or palpitations. Not on tele. Took her scheduled 25mg of Metoprolol.     0742 Dr requesting staff to check vitals in 2-4 hours.

## 2025-08-05 NOTE — DISCHARGE SUMMARY
[facility]  Hospitalist Discharge Summary      Date of Admission:  7/31/2025  Date of Discharge:  8/5/2025  Discharging Provider: Ernesto Prieto DO  Discharge Service: Hospitalist Service    Discharge Diagnoses   85 year old female admitted on 7/31/2025 with a past medical history significant for atrial fibrillation on anticoagulation, history of stroke, presumed Parkinson's disease, and anxiety / depression. She experienced a fall at home this morning around 10:30 am and was found to have pubic rami fractures. She is awaiting TCU placement and has been med ready. She is being 'discharged' from observation to [facility] care.       Update [ August 5, 2025 ]:  notified by utilization review today that (as far as they are aware) no patient whom has been made [facility] care has actually been charged for that and further they were able to guarantee that this patient would be be charged separately if she were to be made [facility] care. With this in mind, I agree with [facility] care.      Closed fracture of multiple rami of right pubis, initial encounter   Fall   Head trauma, on anticoagulation  Rapid atrial flutter  - Lopressor 25mg (6-7am), 12.5mg (Lunch), 25mg at bedtime (9pm)   Possible parkinson's  History of stroke  Cerebrovascular accident (CVA) due to embolism of right middle cerebral artery   Hyperlipidemia LDL goal <130  Recurrent major depressive disorder, in partial remission  Anxiety  Personal history of malignant neoplasm of breast     Impression:   Principal Problem:    Closed fracture of multiple rami of right pubis, initial encounter (H)  Active Problems:    Personal history of malignant neoplasm of breast    Hyperlipidemia LDL goal <130    Anxiety    Osteoporosis    Recurrent major depressive disorder, in partial remission    Rapid atrial fibrillation (H)    History of stroke    Closed fracture of sacrum (H)    Cerebrovascular accident (CVA) due to embolism of right middle  cerebral artery (H)    Follow-ups Needed After Discharge   Follow-up Appointments       Follow Up and recommended labs and tests      Follow up with a hip specialist for your right superior and inferior pubic rami fractures in 2-3 weeks. Call [facility] scheduling at [phone] to make an appointment.            N/a    Unresulted Labs Ordered in the Past 30 Days of this Admission       No orders found from 7/1/2025 to 8/1/2025.        These results will be followed up by n/a    Discharge Disposition   Discharged to [facility] Care  Condition at discharge: Stable    Hospital Course   85 year old female admitted on 7/31/2025 with a past medical history significant for atrial fibrillation on anticoagulation, history of stroke, presumed Parkinson's disease, and anxiety / depression. She experienced a fall at home this morning around 10:30 am and was found to have pubic rami fractures.       Addendum: notified by utilization review today that (as far as they are aware) no patient whom has been made [facility] care has actually been charged for that and further they were able to guarantee that this patient would be be charged separately if she were to be made [facility] care. With this in mind, I agree with [facility] care.     Update [ August 5, 2025 ]:  As of today, 08/05, she remains medically ready (since 08/02) for discharge to TCU. We are still awaiting patient's insurance. A facility has been identified ([facility]), and is waiting to hear from patient's insurance. We are simply waiting on HER insurance to be able to get her to a facility, so I will keep her observation rather than switch her to [facility] care as recommended by utilization review. I decline to change the patient status as of right now.    Course:  Patient admitted (to observation) on 7/31 after a fall with resultant pelvic fracture. She has [employer] insurance which as of 08/01/2025 (1 day before she presented) changed their policy to  "require a prior authorization (PA) for TCU. Her PT/OT eval recommending TCU happened within 24hrs of presentation (H&P 1843pm 7/31; OT/PT notes in 0936 and 1208 respectively on 08/01) but due to [employer] requiring PA starting 08/01 (and due to the interesting decision by [facility] to change this policy on a Friday while also not having processing of any applications or PA/etc over the weekend) patient is still in the hospital.    I was contacted by our utilization review team, first on 08/02 (the day she had digoxin toxicity with severe bradycardia and long pauses with nearly 8 seconds without ventricular electrical activity) to make the patient detention care.    I declined this.    I was contacted again on 08/04 by utilization review stating that patient has been 'deemed' detention for >24 hours, and while I am not sure what 'deem' means in this context, as the patient's current provider, she is actually deemed (by me) to be observation status.     This was declined again.             Closed fracture of multiple rami of right pubis, initial encounter   Patient presented to the ED on 7/31/2025 after taking a fall onto the right hip at home (see below)  Pelvic XR: \"w/unilateral right hip showed no fracture, normal joint spaces and alignment, osteopenia.\"  CT right hip: \"w/o contrast revealed the following: Acute mildly displaced right inferior pubic ramus fracture, acute nondisplaced fracture right superior pubic root at the puboacetabular junction, bone demineralization\"  Patient lives alone and is unable to safely ambulate due to pain.   Case discussed between emergency department and on-call ortho.  - Ortho consulted -- wt bear as tolerated  -- PT eval -- needs TCU and patient agreeable      Fall   Head trauma, on anticoagulation  Lost balance and fell on 7/31, head trauma but no loss of consciousness. No associated dizziness / lightheadedness. Is on Xarelto 20 mg daily, last dose 7/30. Endorses new onset headache and " some neck pain, as well as pain with ambulation (see above).   CT head and cervical spine w/o contrast ordered in ED were unremarkable for any acute intracranial processes, fractures, or post traumatic subluxation.   No focal deficits on admission exam, cranial nerves WNL.     Rapid atrial flutter  Previously diagnosed. Rate controlled prior to admission with metoprolol tartrate 12.5 mg bid. Anticoagulated prior to admission with Xarelto 20 mg q pm. Missed morning metoprolol dose 7/31/2025 and had a heart rate in the 120-140's in the emergency department that improved with IV metoprolol and resumption of oral metoprolol. Patient asymptomatic despite rapid rate.   8/3: increased lopressor to 25mg BID  8/5: has runs of flutter RIGHT before her next scheduled lorpessor dose, her blood pressures have been good with 25mg BID and her HR is 70s after lopressor, will increase to TID to avoid lapse    - Monitor on telemetry   - Lopressor 25mg (6-7am), 12.5mg (Lunch), 25mg at bedtime (9pm)   -  Xarelto       Possible parkinson's  PCP noted shuffling gate with cogwheel rigidity on 5/2024. She received a clinical diagnosis of Parkinsonism, but did not complete a full work-up with neurology (transportation issues). Was started on empiric carbidopa-levodopa which patient says helps to control her symptoms.   - Continue home carbidopa-levodopa     History of stroke  Cerebrovascular accident (CVA) due to embolism of right middle cerebral artery   Hyperlipidemia LDL goal <130  Presented to the ED on 8/12/2024 with weakness and pain after a fall she suffered 3 weeks prior. She also reported that she hit her head for which a head CT was obtained and incidentally showed a subacute right corpus striatum infarct which was then confirmed on MRI. She was not on anticoagulation for her atrial fibrillation prior to this infarct. Is on DOAC as noted above. No new stroke symptoms at time of current admission.   - See above regarding Xarelto    "  Recurrent major depressive disorder, in partial remission  Anxiety  Managed prior to admission with Escitalopram 20 mg daily for depression and anxiety. Patient states \"I feel like I need a bump up in my medication\" for her anxiety, admits to feeling more anxious lately but is calm at time of admission.  - Continue home Escitalopram      Personal history of malignant neoplasm of breast  Right breast cancer treated with localized lumpectomy and radiation in 2009.  Patient states she has \"not had any issues in a long time.\"  - Continue with outpatient surveillance           Impression:   Principal Problem:    Closed fracture of multiple rami of right pubis, initial encounter (H)  Active Problems:    Personal history of malignant neoplasm of breast    Hyperlipidemia LDL goal <130    Anxiety    Osteoporosis    Recurrent major depressive disorder, in partial remission    Rapid atrial fibrillation (H)    History of stroke    Closed fracture of sacrum (H)    Cerebrovascular accident (CVA) due to embolism of right middle cerebral artery (H)      Plan:  as above    DVT Prophylaxis: DOAC  Code Status: No CPR- Do NOT Intubate    Consultations This Hospital Stay   ORTHOPEDIC SURGERY IP CONSULT  CARE MANAGEMENT / SOCIAL WORK IP CONSULT  PHYSICAL THERAPY ADULT IP CONSULT  OCCUPATIONAL THERAPY ADULT IP CONSULT  CARE MANAGEMENT / SOCIAL WORK IP CONSULT  PHARMACY IP CONSULT    Code Status   No CPR- Do NOT Intubate    Time Spent on this Encounter   IErnesto DO, personally saw the patient today and spent greater than 30 minutes discharging this patient.         Ernesto Prieto DO  [facility]  [address] [address]  Phone: [phone]  Fax: [phone]  ______________________________________________________________________    Physical Exam   Vital Signs: Temp: 97.7  F (36.5  C) Temp src: Oral BP: 122/69 Pulse: 64   Resp: 16 SpO2: 97 % O2 Device: None (Room air)    Weight: 113 " lbs 1.54 oz  Physical Exam  Constitutional:       General: Pt is not in acute distress.  HENT:      Head: Normocephalic and atraumatic.      Nose: Nose normal.   Eyes:      Conjunctiva/sclera: Conjunctivae normal.   Pulmonary:      Effort: Pulmonary effort is normal.   Abdominal:      General: Abdomen is flat.   Skin:     Findings: No rash.   Neurological:      General: No focal deficit present.      Mental Status: Pt is alert.   Psychiatric:         Mood and Affect: Mood normal.          Primary Care Physician   Aisha Hull    Discharge Orders      Primary Care - Care Coordination Referral      Follow Up and recommended labs and tests    Follow up with a hip specialist for your right superior and inferior pubic rami fractures in 2-3 weeks. Call [facility] scheduling at [phone] to make an appointment.     Weight bearing status    With walker for support       Significant Results and Procedures   Results for orders placed or performed during the hospital encounter of 07/31/25   Head CT w/o contrast    Narrative    EXAM: CT HEAD W/O CONTRAST, CT CERVICAL SPINE W/O CONTRAST  LOCATION: [facility]  DATE: 7/31/2025    INDICATION: fall with headache.  on xarelto  COMPARISON: August 12, 2024.  TECHNIQUE:   1) Routine CT Head without IV contrast. Multiplanar reformats. Dose reduction techniques were used.  2) Routine CT Cervical Spine without IV contrast. Multiplanar reformats. Dose reduction techniques were used.    FINDINGS:   HEAD CT:   INTRACRANIAL CONTENTS: No intracranial hemorrhage, extraaxial collection, or mass effect.  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 Remote right basal ganglia lacunar infarct. Mild presumed chronic   small vessel ischemic changes. Mild to moderate generalized volume loss. No hydrocephalus.     VISUALIZED ORBITS/SINUSES/MASTOIDS: No intraorbital abnormality. No paranasal sinus mucosal  disease. No middle ear or mastoid effusion.    BONES/SOFT TISSUES: No acute abnormality.    CERVICAL SPINE CT:   VERTEBRA: Grade 1 anterolisthesis C3 on C4 and C4 on C5 measuring approximately 2 mm. Trace retrolisthesis C5 on C6. No fracture or posttraumatic subluxation.     CANAL/FORAMINA: No severe spinal canal stenosis.. Foraminal stenosis is severe on the left at C6-C7.    PARASPINAL: No extraspinal abnormality. Visualized lung fields are clear.      Impression    IMPRESSION:  HEAD CT:  1.  No acute intracranial process.  2.  Chronic changes as above.    CERVICAL SPINE CT:  1.  No CT evidence for acute fracture or post traumatic subluxation.     CT Cervical Spine w/o Contrast    Narrative    EXAM: CT HEAD W/O CONTRAST, CT CERVICAL SPINE W/O CONTRAST  LOCATION: [facility]  DATE: 7/31/2025    INDICATION: fall with headache.  on xarelto  COMPARISON: August 12, 2024.  TECHNIQUE:   1) Routine CT Head without IV contrast. Multiplanar reformats. Dose reduction techniques were used.  2) Routine CT Cervical Spine without IV contrast. Multiplanar reformats. Dose reduction techniques were used.    FINDINGS:   HEAD CT:   INTRACRANIAL CONTENTS: No intracranial hemorrhage, extraaxial collection, or mass effect.  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 Remote right basal ganglia lacunar infarct. Mild presumed chronic   small vessel ischemic changes. Mild to moderate generalized volume loss. No hydrocephalus.     VISUALIZED ORBITS/SINUSES/MASTOIDS: No intraorbital abnormality. No paranasal sinus mucosal disease. No middle ear or mastoid effusion.    BONES/SOFT TISSUES: No acute abnormality.    CERVICAL SPINE CT:   VERTEBRA: Grade 1 anterolisthesis C3 on C4 and C4 on C5 measuring approximately 2 mm. Trace retrolisthesis C5 on C6. No fracture or posttraumatic subluxation.     CANAL/FORAMINA: No severe spinal canal stenosis.. Foraminal stenosis is  severe on the left at C6-C7.    PARASPINAL: No extraspinal abnormality. Visualized lung fields are clear.      Impression    IMPRESSION:  HEAD CT:  1.  No acute intracranial process.  2.  Chronic changes as above.    CERVICAL SPINE CT:  1.  No CT evidence for acute fracture or post traumatic subluxation.     Pelvis XR w/ unilateral hip right    Narrative    EXAM: XR PELVIS AND HIP RIGHT 1 VIEW  LOCATION: [facility]  DATE: 7/31/2025    INDICATION: fall with pain  COMPARISON: None.      Impression    IMPRESSION: Normal joint spaces and alignment. No fracture. Osteopenia.   CT Hip Right w/o Contrast    Narrative    EXAM: CT HIP RIGHT W/O CONTRAST  LOCATION: [facility]  DATE: 7/31/2025    INDICATION: Fall with right hip pain. X-ray negative.  COMPARISON: Same-day radiograph.  TECHNIQUE: Noncontrast. Axial, sagittal and coronal thin-section reconstruction. Dose reduction techniques were used.     FINDINGS:   Small field-of-view right hip images, as well as axial large field-of-view images extending from the midportion of the right distally to the proximal thigh are available for interpretation. The sacrum is not assessed.    Bones are demineralized. Acute mildly displaced fracture right inferior pubic ramus. Nondisplaced fracture right superior pubic root at the puboacetabular junction.    Right hip joint space is maintained. There is no dislocation.    Soft tissue contusion lateral subcutaneous tissues of the right hip.    Scattered atherosclerotic vascular calcifications. Sigmoid colon diverticulosis.      Impression    IMPRESSION:  1.  Acute mildly displaced right inferior pubic ramus fracture.  2.  Acute nondisplaced fracture right superior pubic root at the puboacetabular junction.  3.  Bone demineralization.      NOTE: ABNORMAL REPORT    THE DICTATION ABOVE DESCRIBES AN ABNORMALITY FOR WHICH FOLLOW-UP IS NEEDED.          Discharge Medications     Current  Facility-Administered Medications:     acetaminophen (TYLENOL) tablet 325 mg, 325 mg, Oral, Q8H PRN, Conner, Ernesto, DO    acetaminophen (TYLENOL) tablet 975 mg, 975 mg, Oral, Q8H, Moghe, Ernesto, DO    calcium carbonate (TUMS) chewable tablet 1,000 mg, 1,000 mg, Oral, 4x Daily PRN, Francois Dale MD    carbidopa-levodopa (SINEMET)  MG per tablet 1 tablet, 1 tablet, Oral, BID, Francois Dale MD, 1 tablet at 08/05/25 0734    diclofenac (VOLTAREN) 1 % topical gel 4 g, 4 g, Topical, 4x Daily, Ernesto Prieto, DO, 4 g at 08/05/25 1201    escitalopram (LEXAPRO) tablet 20 mg, 20 mg, Oral, Daily, Francois Dale MD, 20 mg at 08/05/25 0734    metoprolol tartrate (LOPRESSOR) tablet 25 mg, 25 mg, Oral, BID **AND** metoprolol tartrate (LOPRESSOR) half-tab 12.5 mg, 12.5 mg, Oral, Daily with lunch, Mogdora, Ernesto, DO, 12.5 mg at 08/05/25 1202    metoprolol tartrate (LOPRESSOR) half-tab 12.5 mg, 12.5 mg, Oral, BID PRN, Sarahhe, Ernesto, DO    naloxone (NARCAN) injection 0.2 mg, 0.2 mg, Intravenous, Q2 Min PRN **OR** naloxone (NARCAN) injection 0.4 mg, 0.4 mg, Intravenous, Q2 Min PRN **OR** naloxone (NARCAN) injection 0.2 mg, 0.2 mg, Intramuscular, Q2 Min PRN **OR** naloxone (NARCAN) injection 0.4 mg, 0.4 mg, Intramuscular, Q2 Min PRN, Zion Perez MD    naproxen (NAPROSYN) tablet 250 mg, 250 mg, Oral, BID PRN, Conner, Ernesto, DO    ondansetron (ZOFRAN ODT) ODT tab 4 mg, 4 mg, Oral, Q6H PRN **OR** ondansetron (ZOFRAN) injection 4 mg, 4 mg, Intravenous, Q6H PRN, Marybeth Acharya PA-C    oxyCODONE (ROXICODONE) tablet 5 mg, 5 mg, Oral, Q4H PRN, Marybeth Acharya PA-C    oxyCODONE IR (ROXICODONE) half-tab 2.5 mg, 2.5 mg, Oral, Q4H PRN, Marybeth Acharya PA-C    Patient is already receiving anticoagulation with heparin, enoxaparin (LOVENOX), warfarin (COUMADIN)  or other anticoagulant medication, , Does not apply, Continuous PRN, Marybeth Acharya PA-C    prochlorperazine (COMPAZINE) injection 5 mg, 5 mg,  Intravenous, Q6H PRN **OR** prochlorperazine (COMPAZINE) tablet 5 mg, 5 mg, Oral, Q6H PRN, Marybeth Acharya PA-C    rivaroxaban ANTICOAGULANT (XARELTO) tablet 20 mg, 20 mg, Oral, Daily with supper, Moghe, Ernesto, DO, 20 mg at 08/04/25 1658    senna-docusate (SENOKOT-S/PERICOLACE) 8.6-50 MG per tablet 1 tablet, 1 tablet, Oral, BID PRN **OR** senna-docusate (SENOKOT-S/PERICOLACE) 8.6-50 MG per tablet 2 tablet, 2 tablet, Oral, BID PRN, Marybeth Acharya PA-C    Allergies   Allergies   Allergen Reactions    Latex      Blistery rash from gloves    Nkda [No Known Drug Allergy]

## 2025-08-05 NOTE — PROGRESS NOTES
Care Management Follow Up    Length of Stay (days): 0    Expected Discharge Date: 08/06/2025     Concerns to be Addressed: discharge planning     Patient plan of care discussed at interdisciplinary rounds: Yes    Anticipated Discharge Disposition: Transitional Care; [facility] (Phone: [phone]/ Fax: [phone])       Anticipated Discharge Services:  Transportation   Anticipated Discharge DME: None    Patient/family educated on Medicare website which has current facility and service quality ratings: yes  Education Provided on the Discharge Plan:  Yes   Patient/Family in Agreement with the Plan:  Yes    Referrals Placed by CM/SW: Internal Clinic Care Coordination  Private pay costs discussed: transportation costs    Discussed  Partnership in Safe Discharge Planning  document with patient/family: Yes     Handoff Completed: Yes, LUIS CARLOS PCP: Internal handoff referral completed    Additional Information:  Patient is medically ready for discharge per provider.  Patient has been medically ready since 8/2/25.      Background information:   Pt from home.  Had a fall with hip fracture which required NO surgical intervention.  Pt is WBAT per Ortho team.    Current status and recommendations:   Therapy team recommends TCU cares    Current disposition goal:   TCU- pt has been accepted for TCU cares at [facility] (Phone: [phone]/ Fax: [phone]) since 8/1/25.     Barriers to discharge:    [facility] Insurance prior authorization- TAINA spoke with Cirilo at [facility], [phone], and confirmed prior auth remains pending.    [place] contacts:   Gilberto Cuba, son  Jonah Cuba, son  Juany Schwartz, sister    Upcoming important dates:   NA    Next steps:   Need insurance PA for TCU cares    LAURO [place]

## 2025-08-05 NOTE — PROGRESS NOTES
Patient is A & O X4, Mild pain controlled with scheduled Tylenol.  Purewick in place  Patient able to make needs known.

## 2025-08-06 ENCOUNTER — PATIENT OUTREACH (OUTPATIENT)
Dept: CARE COORDINATION | Facility: CLINIC | Age: 85
End: 2025-08-06
Payer: COMMERCIAL

## 2025-08-06 PROCEDURE — 101N000002 HC CUSTODIAL CARE DAILY

## 2025-08-06 PROCEDURE — 250N000013 HC RX MED GY IP 250 OP 250 PS 637: Performed by: STUDENT IN AN ORGANIZED HEALTH CARE EDUCATION/TRAINING PROGRAM

## 2025-08-06 RX ADMIN — ACETAMINOPHEN 650 MG: 325 TABLET ORAL at 10:39

## 2025-08-06 RX ADMIN — RIVAROXABAN 20 MG: 10 TABLET, FILM COATED ORAL at 16:58

## 2025-08-06 RX ADMIN — METOPROLOL TARTRATE 25 MG: 25 TABLET, FILM COATED ORAL at 20:32

## 2025-08-06 RX ADMIN — ACETAMINOPHEN 650 MG: 325 TABLET ORAL at 04:59

## 2025-08-06 RX ADMIN — Medication 4 G: at 13:18

## 2025-08-06 RX ADMIN — METOPROLOL TARTRATE 25 MG: 25 TABLET, FILM COATED ORAL at 05:36

## 2025-08-06 RX ADMIN — CARBIDOPA AND LEVODOPA 1 TABLET: 25; 100 TABLET ORAL at 07:57

## 2025-08-06 RX ADMIN — METOPROLOL TARTRATE 12.5 MG: 25 TABLET, FILM COATED ORAL at 13:22

## 2025-08-06 RX ADMIN — ACETAMINOPHEN 650 MG: 325 TABLET ORAL at 16:57

## 2025-08-06 RX ADMIN — CARBIDOPA AND LEVODOPA 1 TABLET: 25; 100 TABLET ORAL at 20:32

## 2025-08-06 RX ADMIN — ESCITALOPRAM OXALATE 20 MG: 10 TABLET ORAL at 07:57

## 2025-08-06 RX ADMIN — Medication 4 G: at 07:57

## 2025-08-06 ASSESSMENT — ACTIVITIES OF DAILY LIVING (ADL)
ADLS_ACUITY_SCORE: 59
ADLS_ACUITY_SCORE: 62
ADLS_ACUITY_SCORE: 59
ADLS_ACUITY_SCORE: 59
ADLS_ACUITY_SCORE: 62
ADLS_ACUITY_SCORE: 62
ADLS_ACUITY_SCORE: 63
ADLS_ACUITY_SCORE: 62
ADLS_ACUITY_SCORE: 61
ADLS_ACUITY_SCORE: 62
ADLS_ACUITY_SCORE: 65
ADLS_ACUITY_SCORE: 62
ADLS_ACUITY_SCORE: 65
ADLS_ACUITY_SCORE: 62
ADLS_ACUITY_SCORE: 59
ADLS_ACUITY_SCORE: 63
ADLS_ACUITY_SCORE: 59
ADLS_ACUITY_SCORE: 64
DEPENDENT_IADLS:: INDEPENDENT

## 2025-08-07 ENCOUNTER — DOCUMENTATION ONLY (OUTPATIENT)
Dept: GERIATRICS | Facility: CLINIC | Age: 85
End: 2025-08-07
Payer: COMMERCIAL

## 2025-08-07 ENCOUNTER — LAB REQUISITION (OUTPATIENT)
Dept: LAB | Facility: CLINIC | Age: 85
End: 2025-08-07
Payer: COMMERCIAL

## 2025-08-07 VITALS
DIASTOLIC BLOOD PRESSURE: 74 MMHG | HEART RATE: 69 BPM | RESPIRATION RATE: 16 BRPM | OXYGEN SATURATION: 95 % | SYSTOLIC BLOOD PRESSURE: 108 MMHG | TEMPERATURE: 97.4 F

## 2025-08-07 DIAGNOSIS — R76.12 NONSPECIFIC REACTION TO CELL MEDIATED IMMUNITY MEASUREMENT OF GAMMA INTERFERON ANTIGEN RESPONSE WITHOUT ACTIVE TUBERCULOSIS: ICD-10-CM

## 2025-08-07 PROCEDURE — 250N000013 HC RX MED GY IP 250 OP 250 PS 637: Performed by: STUDENT IN AN ORGANIZED HEALTH CARE EDUCATION/TRAINING PROGRAM

## 2025-08-07 RX ORDER — METOPROLOL TARTRATE 25 MG/1
TABLET, FILM COATED ORAL
DISCHARGE
Start: 2025-08-07

## 2025-08-07 RX ORDER — ACETAMINOPHEN 500 MG
500 TABLET ORAL EVERY 4 HOURS PRN
DISCHARGE
Start: 2025-08-07

## 2025-08-07 RX ADMIN — ESCITALOPRAM OXALATE 20 MG: 10 TABLET ORAL at 08:58

## 2025-08-07 RX ADMIN — METOPROLOL TARTRATE 25 MG: 25 TABLET, FILM COATED ORAL at 08:57

## 2025-08-07 RX ADMIN — ACETAMINOPHEN 650 MG: 325 TABLET ORAL at 00:56

## 2025-08-07 RX ADMIN — CARBIDOPA AND LEVODOPA 1 TABLET: 25; 100 TABLET ORAL at 08:58

## 2025-08-07 RX ADMIN — ACETAMINOPHEN 650 MG: 325 TABLET ORAL at 08:58

## 2025-08-07 ASSESSMENT — ACTIVITIES OF DAILY LIVING (ADL)
DEPENDENT_IADLS:: INDEPENDENT
ADLS_ACUITY_SCORE: 63
ADLS_ACUITY_SCORE: 61
ADLS_ACUITY_SCORE: 61
ADLS_ACUITY_SCORE: 63
ADLS_ACUITY_SCORE: 61

## 2025-08-08 PROCEDURE — P9604 ONE-WAY ALLOW PRORATED TRIP: HCPCS | Mod: ORL | Performed by: NURSE PRACTITIONER

## 2025-08-08 PROCEDURE — 86481 TB AG RESPONSE T-CELL SUSP: CPT | Mod: ORL | Performed by: NURSE PRACTITIONER

## 2025-08-11 LAB
GAMMA INTERFERON BACKGROUND BLD IA-ACNC: 0.04 IU/ML
M TB IFN-G BLD-IMP: NEGATIVE
M TB IFN-G CD4+ BCKGRND COR BLD-ACNC: 9.96 IU/ML
MITOGEN IGNF BCKGRD COR BLD-ACNC: -0.01 IU/ML
MITOGEN IGNF BCKGRD COR BLD-ACNC: 0 IU/ML
QUANTIFERON MITOGEN: 10 IU/ML
QUANTIFERON NIL TUBE: 0.04 IU/ML
QUANTIFERON TB1 TUBE: 0.03 IU/ML
QUANTIFERON TB2 TUBE: 0.04

## 2025-08-12 ENCOUNTER — LAB REQUISITION (OUTPATIENT)
Dept: LAB | Facility: CLINIC | Age: 85
End: 2025-08-12
Payer: COMMERCIAL

## 2025-08-14 ENCOUNTER — TRANSITIONAL CARE UNIT VISIT (OUTPATIENT)
Dept: GERIATRICS | Facility: CLINIC | Age: 85
End: 2025-08-14
Payer: COMMERCIAL

## 2025-08-14 VITALS
HEIGHT: 64 IN | BODY MASS INDEX: 18.1 KG/M2 | OXYGEN SATURATION: 97 % | TEMPERATURE: 97.9 F | HEART RATE: 145 BPM | DIASTOLIC BLOOD PRESSURE: 78 MMHG | RESPIRATION RATE: 19 BRPM | WEIGHT: 106 LBS | SYSTOLIC BLOOD PRESSURE: 116 MMHG

## 2025-08-14 DIAGNOSIS — F33.41 RECURRENT MAJOR DEPRESSIVE DISORDER, IN PARTIAL REMISSION: ICD-10-CM

## 2025-08-14 DIAGNOSIS — M81.0 AGE-RELATED OSTEOPOROSIS WITHOUT CURRENT PATHOLOGICAL FRACTURE: ICD-10-CM

## 2025-08-14 DIAGNOSIS — I48.11 LONGSTANDING PERSISTENT ATRIAL FIBRILLATION (H): ICD-10-CM

## 2025-08-14 DIAGNOSIS — G20.C PARKINSONISM, UNSPECIFIED PARKINSONISM TYPE (H): ICD-10-CM

## 2025-08-14 DIAGNOSIS — S32.592S CLOSED BILATERAL FRACTURE OF PUBIC RAMI, SEQUELA: Primary | ICD-10-CM

## 2025-08-14 DIAGNOSIS — R53.81 PHYSICAL DECONDITIONING: ICD-10-CM

## 2025-08-14 DIAGNOSIS — S32.591S CLOSED BILATERAL FRACTURE OF PUBIC RAMI, SEQUELA: Primary | ICD-10-CM

## 2025-08-14 RX ORDER — METOPROLOL TARTRATE 25 MG/1
12.5 TABLET, FILM COATED ORAL 2 TIMES DAILY PRN
Status: SHIPPED
Start: 2025-08-14

## 2025-08-14 RX ORDER — METOPROLOL SUCCINATE 25 MG/1
25 TABLET, EXTENDED RELEASE ORAL 2 TIMES DAILY
Status: SHIPPED
Start: 2025-08-14

## 2025-08-18 ENCOUNTER — TRANSITIONAL CARE UNIT VISIT (OUTPATIENT)
Dept: GERIATRICS | Facility: CLINIC | Age: 85
End: 2025-08-18
Payer: COMMERCIAL

## 2025-08-18 VITALS
TEMPERATURE: 97.2 F | WEIGHT: 108 LBS | OXYGEN SATURATION: 96 % | HEART RATE: 88 BPM | RESPIRATION RATE: 16 BRPM | DIASTOLIC BLOOD PRESSURE: 66 MMHG | HEIGHT: 64 IN | SYSTOLIC BLOOD PRESSURE: 102 MMHG | BODY MASS INDEX: 18.44 KG/M2

## 2025-08-18 DIAGNOSIS — G31.84 MCI (MILD COGNITIVE IMPAIRMENT): ICD-10-CM

## 2025-08-18 DIAGNOSIS — M81.0 AGE-RELATED OSTEOPOROSIS WITHOUT CURRENT PATHOLOGICAL FRACTURE: ICD-10-CM

## 2025-08-18 DIAGNOSIS — K59.01 SLOW TRANSIT CONSTIPATION: ICD-10-CM

## 2025-08-18 DIAGNOSIS — G20.C PARKINSONISM, UNSPECIFIED PARKINSONISM TYPE (H): ICD-10-CM

## 2025-08-18 DIAGNOSIS — S32.591S CLOSED BILATERAL FRACTURE OF PUBIC RAMI, SEQUELA: Primary | ICD-10-CM

## 2025-08-18 DIAGNOSIS — S32.592S CLOSED BILATERAL FRACTURE OF PUBIC RAMI, SEQUELA: Primary | ICD-10-CM

## 2025-08-18 DIAGNOSIS — I48.11 LONGSTANDING PERSISTENT ATRIAL FIBRILLATION (H): ICD-10-CM

## 2025-08-18 DIAGNOSIS — R53.81 PHYSICAL DECONDITIONING: ICD-10-CM

## 2025-08-18 PROCEDURE — 99309 SBSQ NF CARE MODERATE MDM 30: CPT | Performed by: NURSE PRACTITIONER

## 2025-08-18 RX ORDER — METOPROLOL SUCCINATE 25 MG/1
TABLET, EXTENDED RELEASE ORAL
Status: SHIPPED
Start: 2025-08-18 | End: 2025-08-21

## 2025-08-21 ENCOUNTER — TRANSITIONAL CARE UNIT VISIT (OUTPATIENT)
Dept: GERIATRICS | Facility: CLINIC | Age: 85
End: 2025-08-21
Payer: COMMERCIAL

## 2025-08-21 VITALS
SYSTOLIC BLOOD PRESSURE: 118 MMHG | BODY MASS INDEX: 18.44 KG/M2 | TEMPERATURE: 98.1 F | WEIGHT: 108 LBS | HEIGHT: 64 IN | OXYGEN SATURATION: 97 % | DIASTOLIC BLOOD PRESSURE: 78 MMHG | RESPIRATION RATE: 18 BRPM | HEART RATE: 142 BPM

## 2025-08-21 DIAGNOSIS — R53.81 PHYSICAL DECONDITIONING: ICD-10-CM

## 2025-08-21 DIAGNOSIS — I48.11 LONGSTANDING PERSISTENT ATRIAL FIBRILLATION (H): ICD-10-CM

## 2025-08-21 DIAGNOSIS — M81.0 AGE-RELATED OSTEOPOROSIS WITHOUT CURRENT PATHOLOGICAL FRACTURE: ICD-10-CM

## 2025-08-21 DIAGNOSIS — S32.592S CLOSED BILATERAL FRACTURE OF PUBIC RAMI, SEQUELA: Primary | ICD-10-CM

## 2025-08-21 DIAGNOSIS — S32.591S CLOSED BILATERAL FRACTURE OF PUBIC RAMI, SEQUELA: Primary | ICD-10-CM

## 2025-08-21 RX ORDER — METOPROLOL SUCCINATE 25 MG/1
37.5 TABLET, EXTENDED RELEASE ORAL 2 TIMES DAILY
Status: SHIPPED
Start: 2025-08-21

## 2025-08-25 ENCOUNTER — TRANSITIONAL CARE UNIT VISIT (OUTPATIENT)
Dept: GERIATRICS | Facility: CLINIC | Age: 85
End: 2025-08-25
Payer: COMMERCIAL

## 2025-08-25 VITALS
OXYGEN SATURATION: 97 % | RESPIRATION RATE: 16 BRPM | TEMPERATURE: 97.1 F | WEIGHT: 108 LBS | BODY MASS INDEX: 18.44 KG/M2 | DIASTOLIC BLOOD PRESSURE: 76 MMHG | HEIGHT: 64 IN | SYSTOLIC BLOOD PRESSURE: 110 MMHG | HEART RATE: 142 BPM

## 2025-08-25 DIAGNOSIS — I48.11 LONGSTANDING PERSISTENT ATRIAL FIBRILLATION (H): Primary | ICD-10-CM

## 2025-08-25 DIAGNOSIS — S32.592S CLOSED BILATERAL FRACTURE OF PUBIC RAMI, SEQUELA: ICD-10-CM

## 2025-08-25 DIAGNOSIS — S32.591S CLOSED BILATERAL FRACTURE OF PUBIC RAMI, SEQUELA: ICD-10-CM

## 2025-08-25 DIAGNOSIS — R53.81 PHYSICAL DECONDITIONING: ICD-10-CM

## 2025-08-25 DIAGNOSIS — M81.0 AGE-RELATED OSTEOPOROSIS WITHOUT CURRENT PATHOLOGICAL FRACTURE: ICD-10-CM

## 2025-08-25 DIAGNOSIS — K59.01 SLOW TRANSIT CONSTIPATION: ICD-10-CM

## 2025-08-25 PROCEDURE — 99309 SBSQ NF CARE MODERATE MDM 30: CPT | Performed by: NURSE PRACTITIONER

## 2025-08-25 RX ORDER — METOPROLOL SUCCINATE 50 MG/1
50 TABLET, EXTENDED RELEASE ORAL 2 TIMES DAILY
Status: SHIPPED
Start: 2025-08-25

## 2025-08-26 ENCOUNTER — LAB REQUISITION (OUTPATIENT)
Dept: LAB | Facility: CLINIC | Age: 85
End: 2025-08-26
Payer: COMMERCIAL

## 2025-08-26 DIAGNOSIS — I48.0 PAROXYSMAL ATRIAL FIBRILLATION (H): ICD-10-CM

## 2025-08-27 LAB
ANION GAP SERPL CALCULATED.3IONS-SCNC: 9 MMOL/L (ref 7–15)
BUN SERPL-MCNC: 17.7 MG/DL (ref 8–23)
CALCIUM SERPL-MCNC: 9.4 MG/DL (ref 8.8–10.4)
CHLORIDE SERPL-SCNC: 105 MMOL/L (ref 98–107)
CREAT SERPL-MCNC: 0.76 MG/DL (ref 0.51–0.95)
EGFRCR SERPLBLD CKD-EPI 2021: 76 ML/MIN/1.73M2
GLUCOSE SERPL-MCNC: 107 MG/DL (ref 70–99)
HCO3 SERPL-SCNC: 27 MMOL/L (ref 22–29)
MAGNESIUM SERPL-MCNC: 2 MG/DL (ref 1.7–2.3)
POTASSIUM SERPL-SCNC: 4.1 MMOL/L (ref 3.4–5.3)
SODIUM SERPL-SCNC: 141 MMOL/L (ref 135–145)

## 2025-08-28 ENCOUNTER — DISCHARGE SUMMARY NURSING HOME (OUTPATIENT)
Dept: GERIATRICS | Facility: CLINIC | Age: 85
End: 2025-08-28
Payer: COMMERCIAL

## 2025-08-28 VITALS
DIASTOLIC BLOOD PRESSURE: 79 MMHG | WEIGHT: 108 LBS | RESPIRATION RATE: 26 BRPM | HEIGHT: 64 IN | OXYGEN SATURATION: 98 % | HEART RATE: 138 BPM | TEMPERATURE: 97.8 F | BODY MASS INDEX: 18.44 KG/M2 | SYSTOLIC BLOOD PRESSURE: 108 MMHG

## 2025-09-02 ENCOUNTER — MYC MEDICAL ADVICE (OUTPATIENT)
Dept: GERIATRICS | Facility: CLINIC | Age: 85
End: 2025-09-02
Payer: COMMERCIAL

## 2025-09-02 DIAGNOSIS — S32.591S CLOSED BILATERAL FRACTURE OF PUBIC RAMI, SEQUELA: ICD-10-CM

## 2025-09-02 DIAGNOSIS — S32.592S CLOSED BILATERAL FRACTURE OF PUBIC RAMI, SEQUELA: ICD-10-CM

## 2025-09-02 DIAGNOSIS — G20.C PARKINSONISM, UNSPECIFIED PARKINSONISM TYPE (H): Primary | ICD-10-CM

## 2025-09-03 ENCOUNTER — PATIENT OUTREACH (OUTPATIENT)
Dept: CARE COORDINATION | Facility: CLINIC | Age: 85
End: 2025-09-03
Payer: COMMERCIAL

## 2025-09-03 ENCOUNTER — TELEPHONE (OUTPATIENT)
Dept: FAMILY MEDICINE | Facility: CLINIC | Age: 85
End: 2025-09-03
Payer: COMMERCIAL

## 2025-09-03 ASSESSMENT — ACTIVITIES OF DAILY LIVING (ADL): DEPENDENT_IADLS:: INDEPENDENT

## 2025-09-04 DIAGNOSIS — Z09 HOSPITAL DISCHARGE FOLLOW-UP: ICD-10-CM

## (undated) DEVICE — SOL WATER IRRIG 1000ML BOTTLE 07139-09

## (undated) DEVICE — SOL NACL 0.9% IRRIG 1000ML BOTTLE 07138-09

## (undated) RX ORDER — TROPICAMIDE 10 MG/ML
SOLUTION/ DROPS OPHTHALMIC
Status: DISPENSED
Start: 2020-03-04

## (undated) RX ORDER — CYCLOPENTOLATE HYDROCHLORIDE 10 MG/ML
SOLUTION/ DROPS OPHTHALMIC
Status: DISPENSED
Start: 2020-02-12

## (undated) RX ORDER — TROPICAMIDE 10 MG/ML
SOLUTION/ DROPS OPHTHALMIC
Status: DISPENSED
Start: 2020-02-12

## (undated) RX ORDER — PHENYLEPHRINE HYDROCHLORIDE 25 MG/ML
SOLUTION/ DROPS OPHTHALMIC
Status: DISPENSED
Start: 2020-02-12

## (undated) RX ORDER — PHENYLEPHRINE HYDROCHLORIDE 25 MG/ML
SOLUTION/ DROPS OPHTHALMIC
Status: DISPENSED
Start: 2020-03-04

## (undated) RX ORDER — CYCLOPENTOLATE HYDROCHLORIDE 10 MG/ML
SOLUTION/ DROPS OPHTHALMIC
Status: DISPENSED
Start: 2020-03-04